# Patient Record
Sex: MALE | Race: WHITE | Employment: OTHER | URBAN - NONMETROPOLITAN AREA
[De-identification: names, ages, dates, MRNs, and addresses within clinical notes are randomized per-mention and may not be internally consistent; named-entity substitution may affect disease eponyms.]

---

## 2016-08-05 LAB
CHOLESTEROL, TOTAL: 138 MG/DL
CHOLESTEROL/HDL RATIO: 2.6
HDLC SERPL-MCNC: 54 MG/DL (ref 35–70)
LDL CHOLESTEROL CALCULATED: 75 MG/DL (ref 0–160)
TRIGL SERPL-MCNC: 45 MG/DL
VLDLC SERPL CALC-MCNC: 9 MG/DL

## 2017-08-07 LAB
AGE FOR GFR: 80
ALT SERPL-CCNC: 29 UNITS/L
ANION GAP SERPL CALCULATED.3IONS-SCNC: 9 MMOL/L
AST SERPL-CCNC: 28 UNITS/L
BUN BLDV-MCNC: 28 MG/DL
CALCIUM SERPL-MCNC: 9.1 MG/DL
CHLORIDE BLD-SCNC: 107 MMOL/L
CHOLESTEROL/HDL RATIO: 2.7 RATIO
CHOLESTEROL: 135 MG/DL
CO2: 30 MMOL/L
CREAT SERPL-MCNC: 0.9 MG/DL
EGFR BF: 73 ML/MIN/1.73 M2
EGFR BM: 98 ML/MIN/1.73 M2
EGFR WF: 60 ML/MIN/1.73 M2
EGFR WM: 81 ML/MIN/1.73 M2
GLUCOSE: 76 MG/DL
HDL, DIRECT: 50 MG/DL
LDL CHOLESTEROL CALCULATED: 77.2 MG/DL
POTASSIUM SERPL-SCNC: 4.6 MMOL/L
SODIUM BLD-SCNC: 141 MMOL/L
TRIGL SERPL-MCNC: 39 MG/DL
VLDLC SERPL CALC-MCNC: 8 MG/DL

## 2017-08-08 VITALS
BODY MASS INDEX: 31.64 KG/M2 | WEIGHT: 226 LBS | SYSTOLIC BLOOD PRESSURE: 122 MMHG | HEART RATE: 60 BPM | DIASTOLIC BLOOD PRESSURE: 68 MMHG | HEIGHT: 71 IN

## 2017-08-08 DIAGNOSIS — D69.6 THROMBOCYTOPENIA (HCC): ICD-10-CM

## 2017-08-08 DIAGNOSIS — I10 UNSPECIFIED ESSENTIAL HYPERTENSION: ICD-10-CM

## 2017-08-08 DIAGNOSIS — R26.9 ABNORMALITY OF GAIT: ICD-10-CM

## 2017-08-08 PROBLEM — E78.5 HYPERLIPEMIA: Status: ACTIVE | Noted: 2017-08-08

## 2017-08-08 PROBLEM — N40.0 BPH (BENIGN PROSTATIC HYPERTROPHY): Status: ACTIVE | Noted: 2017-08-08

## 2017-08-08 PROBLEM — N52.9 ERECTILE DYSFUNCTION: Status: ACTIVE | Noted: 2017-08-08

## 2017-08-08 RX ORDER — FINASTERIDE 5 MG/1
5 TABLET, FILM COATED ORAL DAILY
COMMUNITY
End: 2017-09-19 | Stop reason: SDUPTHER

## 2017-08-08 RX ORDER — ATORVASTATIN CALCIUM 20 MG/1
20 TABLET, FILM COATED ORAL NIGHTLY
COMMUNITY
End: 2018-04-05 | Stop reason: SDUPTHER

## 2017-08-08 RX ORDER — MELOXICAM 7.5 MG/1
7.5 TABLET ORAL DAILY
COMMUNITY
End: 2017-08-10 | Stop reason: SDUPTHER

## 2017-08-08 RX ORDER — FAMOTIDINE 20 MG/1
20 TABLET, FILM COATED ORAL 2 TIMES DAILY
COMMUNITY
End: 2019-05-18 | Stop reason: ALTCHOICE

## 2017-08-08 RX ORDER — DOCUSATE SODIUM 100 MG/1
100 CAPSULE, LIQUID FILLED ORAL DAILY
COMMUNITY
End: 2020-08-20 | Stop reason: SDUPTHER

## 2017-08-08 RX ORDER — CHOLECALCIFEROL (VITAMIN D3) 125 MCG
10 CAPSULE ORAL DAILY
COMMUNITY
End: 2022-11-01

## 2017-08-08 RX ORDER — OXYBUTYNIN CHLORIDE 10 MG/1
10 TABLET, EXTENDED RELEASE ORAL DAILY
COMMUNITY
End: 2018-01-05 | Stop reason: SDUPTHER

## 2017-08-08 RX ORDER — ASPIRIN 325 MG
325 TABLET ORAL DAILY
COMMUNITY
End: 2019-05-18 | Stop reason: ALTCHOICE

## 2017-08-08 RX ORDER — POLYETHYLENE GLYCOL 3350 17 G/17G
17 POWDER, FOR SOLUTION ORAL DAILY
COMMUNITY

## 2017-08-08 RX ORDER — PSEUDOEPHEDRINE HCL 120 MG/1
120 TABLET, FILM COATED, EXTENDED RELEASE ORAL EVERY 12 HOURS
COMMUNITY
End: 2017-08-10 | Stop reason: ALTCHOICE

## 2017-08-08 RX ORDER — SILDENAFIL 50 MG/1
50 TABLET, FILM COATED ORAL PRN
COMMUNITY
End: 2018-02-22

## 2017-08-10 ENCOUNTER — OFFICE VISIT (OUTPATIENT)
Dept: FAMILY MEDICINE CLINIC | Age: 82
End: 2017-08-10
Payer: MEDICARE

## 2017-08-10 VITALS
DIASTOLIC BLOOD PRESSURE: 80 MMHG | WEIGHT: 226 LBS | HEART RATE: 80 BPM | SYSTOLIC BLOOD PRESSURE: 128 MMHG | BODY MASS INDEX: 31.43 KG/M2

## 2017-08-10 DIAGNOSIS — D69.6 THROMBOCYTOPENIA (HCC): ICD-10-CM

## 2017-08-10 DIAGNOSIS — I10 UNSPECIFIED ESSENTIAL HYPERTENSION: Primary | ICD-10-CM

## 2017-08-10 DIAGNOSIS — E78.2 MIXED HYPERLIPIDEMIA: ICD-10-CM

## 2017-08-10 DIAGNOSIS — R26.9 ABNORMALITY OF GAIT: ICD-10-CM

## 2017-08-10 LAB
BASOPHILS # BLD: 0.03 THOU/MM3
DIFFERENTIAL: AUTOMATED DIFF
EOSINOPHIL # BLD: 0.2 THOU/MM3
HCT VFR BLD CALC: 45.5 %
HEMOGLOBIN: 14.7 G/DL
LYMPHOCYTES # BLD: 0.95 THOU/MM3
MCH RBC QN AUTO: 33.3 PG
MCHC RBC AUTO-ENTMCNC: 32.4 G/DL
MCV RBC AUTO: 102.9 FL
MONOCYTES # BLD: 0.5 THOU/MM3
NEUTROPHILS: 1.83 THOU/MM3
PDW BLD-RTO: 12 %
PLATELET # BLD: 109 THOU/MM3
PMV BLD AUTO: 7.1 FL
RBC # BLD: 4.42 M/UL
WBC # BLD: 3.51 THOU/ML3

## 2017-08-10 PROCEDURE — 99214 OFFICE O/P EST MOD 30 MIN: CPT | Performed by: FAMILY MEDICINE

## 2017-08-10 RX ORDER — MELOXICAM 7.5 MG/1
7.5 TABLET ORAL DAILY
Qty: 90 TABLET | Refills: 3 | Status: SHIPPED | OUTPATIENT
Start: 2017-08-10 | End: 2018-07-11 | Stop reason: SDUPTHER

## 2017-08-10 ASSESSMENT — PATIENT HEALTH QUESTIONNAIRE - PHQ9
SUM OF ALL RESPONSES TO PHQ QUESTIONS 1-9: 0
2. FEELING DOWN, DEPRESSED OR HOPELESS: 0
1. LITTLE INTEREST OR PLEASURE IN DOING THINGS: 0
SUM OF ALL RESPONSES TO PHQ9 QUESTIONS 1 & 2: 0

## 2017-08-10 ASSESSMENT — ENCOUNTER SYMPTOMS
ABDOMINAL PAIN: 0
DIARRHEA: 0
NAUSEA: 0
CONSTIPATION: 0

## 2017-08-28 ENCOUNTER — TELEPHONE (OUTPATIENT)
Dept: FAMILY MEDICINE CLINIC | Age: 82
End: 2017-08-28

## 2017-08-28 DIAGNOSIS — M25.511 CHRONIC PAIN OF BOTH SHOULDERS: Primary | ICD-10-CM

## 2017-08-28 DIAGNOSIS — G89.29 CHRONIC PAIN OF BOTH SHOULDERS: Primary | ICD-10-CM

## 2017-08-28 DIAGNOSIS — M25.512 CHRONIC PAIN OF BOTH SHOULDERS: Primary | ICD-10-CM

## 2017-09-20 RX ORDER — FINASTERIDE 5 MG/1
TABLET, FILM COATED ORAL
Qty: 90 TABLET | Refills: 3 | Status: SHIPPED | OUTPATIENT
Start: 2017-09-20 | End: 2018-08-23 | Stop reason: SDUPTHER

## 2017-09-25 ENCOUNTER — NURSE ONLY (OUTPATIENT)
Dept: FAMILY MEDICINE CLINIC | Age: 82
End: 2017-09-25
Payer: MEDICARE

## 2017-09-25 VITALS — TEMPERATURE: 97.7 F

## 2017-09-25 DIAGNOSIS — Z23 NEED FOR PROPHYLACTIC VACCINATION AND INOCULATION AGAINST INFLUENZA: Primary | ICD-10-CM

## 2017-09-25 PROCEDURE — G0008 ADMIN INFLUENZA VIRUS VAC: HCPCS | Performed by: FAMILY MEDICINE

## 2017-09-25 PROCEDURE — 90662 IIV NO PRSV INCREASED AG IM: CPT | Performed by: FAMILY MEDICINE

## 2017-12-13 ENCOUNTER — OFFICE VISIT (OUTPATIENT)
Dept: FAMILY MEDICINE CLINIC | Age: 82
End: 2017-12-13
Payer: MEDICARE

## 2017-12-13 VITALS
WEIGHT: 230 LBS | DIASTOLIC BLOOD PRESSURE: 90 MMHG | SYSTOLIC BLOOD PRESSURE: 160 MMHG | OXYGEN SATURATION: 95 % | TEMPERATURE: 96.5 F | BODY MASS INDEX: 31.99 KG/M2 | HEART RATE: 63 BPM

## 2017-12-13 DIAGNOSIS — M76.891 TENDINITIS INVOLVING RIGHT HIP ABDUCTORS: Primary | ICD-10-CM

## 2017-12-13 PROCEDURE — 99213 OFFICE O/P EST LOW 20 MIN: CPT | Performed by: FAMILY MEDICINE

## 2017-12-13 RX ORDER — PREDNISONE 20 MG/1
20 TABLET ORAL DAILY
Qty: 7 TABLET | Refills: 0 | Status: SHIPPED | OUTPATIENT
Start: 2017-12-13 | End: 2017-12-20

## 2017-12-13 ASSESSMENT — ENCOUNTER SYMPTOMS: CHEST TIGHTNESS: 0

## 2017-12-13 NOTE — PROGRESS NOTES
[address] [address] [address]  Dept: [phone]  Dept Fax: [phone]    June León is a [de-identified] y.o. male who presents today for his medical conditions/complaints as noted below. June León is c/o of Leg Pain (right, upper outer thigh, only when sitting)      HPI:     HPI  Right hip- similar to previous. No recent injury but a few weeks ago fell when sitting in the garage onto the right hip. No pain at the time, this started weeks later. HAs increased in stiiffness. Fine when up and moving, sitiff and aches when sitting or lying. This is the hip he broke and has replaced. BP Readings from Last 3 Encounters:   12/13/17 (!) 160/90   08/10/17 128/80   03/17/17 122/68          (goal 120/80)    Wt Readings from Last 3 Encounters:   12/13/17 230 lb (104.3 kg)   08/10/17 226 lb (102.5 kg)   03/17/17 226 lb (102.5 kg)        Past Medical History:   Diagnosis Date    C2 cervical fracture (HCC)     Cancer ([address]     skin    Enlarged prostate     Hyperlipidemia     Thrombocytopenia (HCC)     borderline episodic      Past Surgical History:   Procedure Laterality Date    CERVICAL SPINE SURGERY  03/29/2016    [facility] C2 Fracture    HIP SURGERY Right     ORIF after fall on the ice    HIP SURGERY Right 01/2015       Family History   Problem Relation Age of Onset    Stroke Father     Coronary Art Dis Father     Stroke Sister        Social History   Substance Use Topics    Smoking status: Never Smoker    Smokeless tobacco: Never Used    Alcohol use No      Current Outpatient Prescriptions   Medication Sig Dispense Refill    predniSONE (DELTASONE) 20 MG tablet Take 1 tablet by mouth daily for 7 days Take with food.  7 tablet 0    finasteride (PROSCAR) 5 MG tablet TAKE 1 TABLET DAILY 90 tablet 3    meloxicam (MOBIC) 7.5 MG tablet Take 1 tablet by mouth daily 90 tablet 3    oxybutynin (DITROPAN-XL) 10 MG extended release tablet Take 10 mg by mouth daily      Handicap Placard MISC by Does not apply route      aspirin 325 MG tablet Take 325 mg by mouth daily      polyethylene glycol (GLYCOLAX) powder Take 17 g by mouth daily      melatonin 5 MG TABS tablet Take 5 mg by mouth daily      docusate sodium (COLACE) 100 MG capsule Take 100 mg by mouth daily      atorvastatin (LIPITOR) 20 MG tablet Take 20 mg by mouth nightly      sildenafil (VIAGRA) 50 MG tablet Take 50 mg by mouth as needed for Erectile Dysfunction      famotidine (PEPCID) 20 MG tablet Take 20 mg by mouth 2 times daily       No current facility-administered medications for this visit. No Known Allergies    Health Maintenance   Topic Date Due    DTaP/Tdap/Td vaccine (2 - Td) 12/02/2019    Zostavax vaccine  Completed    Flu vaccine  Completed    Pneumococcal low/med risk  Completed       Subjective:      Review of Systems   Constitutional: Negative for activity change, appetite change, fatigue, fever and unexpected weight change. Respiratory: Negative for chest tightness. Cardiovascular: Positive for leg swelling. Actually pretty good       Objective:     BP (!) 160/90   Pulse 63   Temp 96.5 °F (35.8 °C) (Tympanic)   Wt 230 lb (104.3 kg)   SpO2 95%   BMI 31.99 kg/m²     Physical Exam   Constitutional: He appears well-developed and well-nourished. Musculoskeletal: He exhibits edema. Legs:  1+ with brawny change bilaterally worse on the right- chronic. No groin tenderness, normal pulses   Psychiatric: He has a normal mood and affect. His behavior is normal. Judgment and thought content normal.   Nursing note and vitals reviewed. Assessment/Plan:     1.  Tendinitis involving right hip abductors  [facility] [place]    predniSONE (DELTASONE) 20 MG tablet         Lab Results   Component Value Date    WBC 3.51 (L) 08/10/2017    HGB 14.7 08/10/2017    HCT 45.5 08/10/2017     (L) 08/10/2017    CHOL 135 08/07/2017    TRIG 39

## 2018-01-05 DIAGNOSIS — N40.0 BENIGN PROSTATIC HYPERPLASIA, UNSPECIFIED WHETHER LOWER URINARY TRACT SYMPTOMS PRESENT: Primary | ICD-10-CM

## 2018-01-05 RX ORDER — OXYBUTYNIN CHLORIDE 10 MG/1
TABLET, EXTENDED RELEASE ORAL
Qty: 90 TABLET | Refills: 0 | Status: SHIPPED | OUTPATIENT
Start: 2018-01-05 | End: 2018-03-23 | Stop reason: SDUPTHER

## 2018-01-05 NOTE — TELEPHONE ENCOUNTER
Artemio Figueroa is calling to request a refill on the following medication(s):  Requested Prescriptions     Pending Prescriptions Disp Refills    oxybutynin (DITROPAN-XL) 10 MG extended release tablet [Pharmacy Med Name: OXYBUTYNIN CL ER TABS 10MG] 90 tablet 0     Sig: TAKE 1 TABLET DAILY       Last Visit Date (If Applicable):  54/14/8870    Next Visit Date:    2/22/2018

## 2018-02-22 ENCOUNTER — OFFICE VISIT (OUTPATIENT)
Dept: FAMILY MEDICINE CLINIC | Age: 83
End: 2018-02-22
Payer: MEDICARE

## 2018-02-22 VITALS
SYSTOLIC BLOOD PRESSURE: 138 MMHG | HEART RATE: 78 BPM | DIASTOLIC BLOOD PRESSURE: 86 MMHG | WEIGHT: 238 LBS | BODY MASS INDEX: 31.54 KG/M2 | HEIGHT: 73 IN

## 2018-02-22 DIAGNOSIS — Z87.81 HISTORY OF HIP FRACTURE: ICD-10-CM

## 2018-02-22 DIAGNOSIS — I10 ESSENTIAL HYPERTENSION: ICD-10-CM

## 2018-02-22 DIAGNOSIS — E78.2 MIXED HYPERLIPIDEMIA: ICD-10-CM

## 2018-02-22 DIAGNOSIS — D69.6 THROMBOCYTOPENIA (HCC): ICD-10-CM

## 2018-02-22 DIAGNOSIS — M19.049 HAND ARTHRITIS: ICD-10-CM

## 2018-02-22 DIAGNOSIS — Z00.00 ROUTINE GENERAL MEDICAL EXAMINATION AT A HEALTH CARE FACILITY: Primary | ICD-10-CM

## 2018-02-22 DIAGNOSIS — Z23 NEED FOR PROPHYLACTIC VACCINATION AND INOCULATION AGAINST VARICELLA: ICD-10-CM

## 2018-02-22 DIAGNOSIS — Z87.81 HISTORY OF CERVICAL FRACTURE: ICD-10-CM

## 2018-02-22 DIAGNOSIS — M85.9 DISORDER OF BONE DENSITY AND STRUCTURE, UNSPECIFIED: ICD-10-CM

## 2018-02-22 PROCEDURE — G0439 PPPS, SUBSEQ VISIT: HCPCS | Performed by: FAMILY MEDICINE

## 2018-02-22 ASSESSMENT — LIFESTYLE VARIABLES
AUDIT-C TOTAL SCORE: 1
HOW OFTEN DO YOU HAVE A DRINK CONTAINING ALCOHOL: 1
HOW MANY STANDARD DRINKS CONTAINING ALCOHOL DO YOU HAVE ON A TYPICAL DAY: 0
HOW OFTEN DO YOU HAVE SIX OR MORE DRINKS ON ONE OCCASION: 0

## 2018-02-22 ASSESSMENT — PATIENT HEALTH QUESTIONNAIRE - PHQ9: SUM OF ALL RESPONSES TO PHQ QUESTIONS 1-9: 0

## 2018-02-22 ASSESSMENT — ANXIETY QUESTIONNAIRES: GAD7 TOTAL SCORE: 0

## 2018-02-22 NOTE — LETTER
February 22, 2018     Patient: Kwame Ibarra   YOB: 1935   Date of Visit: 2/22/2018       To Whom it May Concern:    Kwame Ibarra was seen in my clinic on 2/22/2018. He has a jhonathan in his right hip secondary to previous hip trauma. If you have any questions or concerns, please don't hesitate to call.     Sincerely,         María Pack MD

## 2018-02-22 NOTE — PROGRESS NOTES
inoculation against varicella  ordered  - zoster recombinant adjuvanted vaccine (SHINGRIX) 50 MCG SUSR injection; Inject 0.5 mLs into the muscle once for 1 dose  Dispense: 0.5 mL; Refill: 0    4. History of hip fracture    - DEXA Bone Density 2 Sites; Future    5. History of cervical fracture    - DEXA Bone Density 2 Sites; Future    6. Hand arthritis  Can use OTC topical rubs and would consider and injection if needed    7. Essential hypertension  Well controlled at this time  - Basic Metabolic Panel, Fasting; Future    8. Thrombocytopenia ([address]  History of- last few labs were improved but should have follow up   - CBC Auto Differential; Future    9. Mixed hyperlipidemia    - ALT; Future  - Lipid Panel; Future  - AST; Future      Positive Risk Factor Screenings with Interventions:               Health Habits/Nutrition:  Health Habits/Nutrition  Do you exercise for at least 20 minutes 2-3 times per week?: Yes  Have you lost any weight without trying in the past 3 months?: No  Do you eat fewer than 2 meals per day?: No  Have you seen a dentist within the past year?: Yes  Body mass index is 31.4 kg/m².   Health Habits/Nutrition Interventions:  · using cardiac rehab facilities            Personalized Preventive Plan   Current Health Maintenance Status  Immunization History   Administered Date(s) Administered    DTaP (Infanrix) 12/02/2009    Hepatitis A Adult (Havarix) 05/01/2001, 10/20/2001, 11/20/2001    Hepatitis B Adult (Engerix-B) 04/30/1997, 05/30/1997, 09/24/1997    Influenza Virus Vaccine 11/09/2016    Influenza, High Dose 09/25/2017    Meningococcal B, OMV (Bexsero) 11/16/2011    Pneumococcal 13-valent Conjugate ([id number]) 08/17/2015    Pneumococcal Polysaccharide ([id number]) 02/25/2010    Yellow Fever 11/01/2011    Zoster Live (Zostavax) 04/18/2012        Health Maintenance   Topic Date Due    Shingles Vaccine (1 of 2 - 2 Dose Series) 12/24/1986    Potassium monitoring  08/07/2018    Creatinine

## 2018-02-22 NOTE — PATIENT INSTRUCTIONS
Personalized Preventive Plan for Jozef Corral - 2/22/2018  Medicare offers a range of preventive health benefits. Some of the tests and screenings are paid in full while other may be subject to a deductible, co-insurance, and/or copay. Some of these benefits include a comprehensive review of your medical history including lifestyle, illnesses that may run in your family, and various assessments and screenings as appropriate. After reviewing your medical record and screening and assessments performed today your provider may have ordered immunizations, labs, imaging, and/or referrals for you. A list of these orders (if applicable) as well as your Preventive Care list are included within your After Visit Summary for your review. Other Preventive Recommendations:    · A preventive eye exam performed by an eye specialist is recommended every 1-2 years to screen for glaucoma; cataracts, macular degeneration, and other eye disorders. · A preventive dental visit is recommended every 6 months. · Try to get at least 150 minutes of exercise per week or 10,000 steps per day on a pedometer . · Order or download the FREE \"Exercise & Physical Activity: Your Everyday Guide\" from [employer]. Call 4-696.338.9188 or search [employer] online. · You need 1297-7734 mg of calcium and 0551-5140 IU of vitamin D per day. It is possible to meet your calcium requirement with diet alone, but a vitamin D supplement is usually necessary to meet this goal.  · When exposed to the sun, use a sunscreen that protects against both UVA and UVB radiation with an SPF of 30 or greater. Reapply every 2 to 3 hours or after sweating, drying off with a towel, or swimming.   · Always wear a seat belt when traveling in a car

## 2018-03-23 DIAGNOSIS — N40.0 BENIGN PROSTATIC HYPERPLASIA, UNSPECIFIED WHETHER LOWER URINARY TRACT SYMPTOMS PRESENT: ICD-10-CM

## 2018-03-23 RX ORDER — OXYBUTYNIN CHLORIDE 10 MG/1
10 TABLET, EXTENDED RELEASE ORAL DAILY
Qty: 90 TABLET | Refills: 3 | Status: SHIPPED | OUTPATIENT
Start: 2018-03-23 | End: 2019-04-04 | Stop reason: SDUPTHER

## 2018-04-08 RX ORDER — ATORVASTATIN CALCIUM 20 MG/1
TABLET, FILM COATED ORAL
Qty: 90 TABLET | Refills: 3 | Status: SHIPPED | OUTPATIENT
Start: 2018-04-08 | End: 2019-05-20 | Stop reason: SDUPTHER

## 2018-04-09 ENCOUNTER — TELEPHONE (OUTPATIENT)
Dept: FAMILY MEDICINE CLINIC | Age: 83
End: 2018-04-09

## 2018-07-12 RX ORDER — MELOXICAM 7.5 MG/1
TABLET ORAL
Qty: 90 TABLET | Refills: 3 | Status: SHIPPED | OUTPATIENT
Start: 2018-07-12 | End: 2019-05-18 | Stop reason: ALTCHOICE

## 2018-08-09 LAB
AGE FOR GFR: 81
ALT SERPL-CCNC: 29 UNITS/L
ANION GAP SERPL CALCULATED.3IONS-SCNC: 13 MMOL/L
AST SERPL-CCNC: 26 UNITS/L
BASOPHILS # BLD: 0.04 THOU/MM3
BUN BLDV-MCNC: 29 MG/DL
CALCIUM SERPL-MCNC: 9.3 MG/DL
CHLORIDE BLD-SCNC: 106 MMOL/L
CHOLESTEROL/HDL RATIO: 2.5 RATIO
CHOLESTEROL: 146 MG/DL
CO2: 27 MMOL/L
CREAT SERPL-MCNC: 0.9 MG/DL
DIFFERENTIAL: AUTOMATED DIFF
EGFR BF: 73 ML/MIN/1.73 M2
EGFR BM: 98 ML/MIN/1.73 M2
EGFR WF: 60 ML/MIN/1.73 M2
EGFR WM: 81 ML/MIN/1.73 M2
EOSINOPHIL # BLD: 0.16 THOU/MM3
GLUCOSE: 83 MG/DL
HCT VFR BLD CALC: 41.6 %
HDL, DIRECT: 59 MG/DL
HEMOGLOBIN: 14 G/DL
LDL CHOLESTEROL CALCULATED: 78.6 MG/DL
LYMPHOCYTES # BLD: 0.83 THOU/MM3
MCH RBC QN AUTO: 33.4 PG
MCHC RBC AUTO-ENTMCNC: 33.7 G/DL
MCV RBC AUTO: 99.3 FL
MONOCYTES # BLD: 0.55 THOU/MM3
NEUTROPHILS: 2.71 THOU/MM3
PDW BLD-RTO: 11.6 %
PLATELET # BLD: 138 THOU/MM3
PMV BLD AUTO: 6.7 FL
POTASSIUM SERPL-SCNC: 4.7 MMOL/L
RBC # BLD: 4.19 M/UL
SODIUM BLD-SCNC: 141 MMOL/L
TRIGL SERPL-MCNC: 42 MG/DL
VLDLC SERPL CALC-MCNC: 8 MG/DL
WBC # BLD: 4.29 THOU/ML3

## 2018-08-23 ENCOUNTER — OFFICE VISIT (OUTPATIENT)
Dept: FAMILY MEDICINE CLINIC | Age: 83
End: 2018-08-23
Payer: MEDICARE

## 2018-08-23 VITALS
SYSTOLIC BLOOD PRESSURE: 122 MMHG | HEART RATE: 66 BPM | WEIGHT: 239 LBS | DIASTOLIC BLOOD PRESSURE: 74 MMHG | BODY MASS INDEX: 31.53 KG/M2

## 2018-08-23 DIAGNOSIS — I10 ESSENTIAL HYPERTENSION: Primary | ICD-10-CM

## 2018-08-23 DIAGNOSIS — N40.0 BENIGN PROSTATIC HYPERPLASIA, UNSPECIFIED WHETHER LOWER URINARY TRACT SYMPTOMS PRESENT: ICD-10-CM

## 2018-08-23 DIAGNOSIS — E78.2 MIXED HYPERLIPIDEMIA: ICD-10-CM

## 2018-08-23 DIAGNOSIS — R26.9 ABNORMALITY OF GAIT: ICD-10-CM

## 2018-08-23 PROCEDURE — 99214 OFFICE O/P EST MOD 30 MIN: CPT | Performed by: FAMILY MEDICINE

## 2018-08-23 RX ORDER — FINASTERIDE 5 MG/1
TABLET, FILM COATED ORAL
Qty: 90 TABLET | Refills: 3 | Status: SHIPPED | OUTPATIENT
Start: 2018-08-23 | End: 2019-09-03 | Stop reason: SDUPTHER

## 2018-08-23 ASSESSMENT — ENCOUNTER SYMPTOMS
BLURRED VISION: 0
DIARRHEA: 0
ABDOMINAL PAIN: 0
CONSTIPATION: 0
SHORTNESS OF BREATH: 0
ORTHOPNEA: 0

## 2018-08-23 NOTE — PROGRESS NOTES
Review of Systems   Constitutional: Negative for activity change, appetite change and fatigue. Respiratory: Negative for shortness of breath. Cardiovascular: Negative for chest pain and palpitations. Gastrointestinal: Negative for abdominal pain, constipation and diarrhea. Genitourinary: Negative for difficulty urinating, frequency and urgency. Neurological: Negative for dizziness and light-headedness. Psychiatric/Behavioral: Negative for sleep disturbance.      No verbal complaints, doing well

## 2018-08-23 NOTE — PROGRESS NOTES
[address] [address] [address]  Dept: [phone]  Dept Fax: [phone]    Elijah Mac is a 81 y.o. male who presents today for his medical conditions/complaints as noted below. Elijah Mac is c/o of Hypertension      HPI:     Hypertension   This is a chronic problem. The current episode started today. The problem is unchanged. The problem is controlled. Pertinent negatives include no anxiety, blurred vision, chest pain, headaches, malaise/fatigue, neck pain, orthopnea, palpitations, peripheral edema, PND, shortness of breath or sweats. There are no associated agents to hypertension. Risk factors for coronary artery disease include dyslipidemia. Past treatments include lifestyle changes. 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 Occasional aches more in the hands from using the cane.   Does take the meloxicam yet but thinks he can do without regularly    Has episodic times when he has to really rush to get to the bathroom - no waiting, no incontinence.,    BP Readings from Last 3 Encounters:   08/23/18 122/74   02/22/18 138/86   12/13/17 (!) 160/90          (goal 120/80)    Wt Readings from Last 3 Encounters:   08/23/18 239 lb (108.4 kg)   02/22/18 238 lb (108 kg)   12/13/17 230 lb (104.3 kg)        Past Medical History:   Diagnosis Date    C2 cervical fracture (HCC)     Cancer (HCC)     skin    Enlarged prostate     Hyperlipidemia     Thrombocytopenia (HCC)     borderline episodic      Past Surgical History:   Procedure Laterality Date    CERVICAL SPINE SURGERY  [facility]/29/2016    [facility] C2 Fracture    HIP SURGERY Right     ORIF after fall on the ice    HIP SURGERY Right 01/2015       Family History   Problem Relation Age of Onset    Stroke Father     Coronary Art Dis Father     Stroke Sister        Social History atraumatic. Eyes: Conjunctivae and EOM are normal.   Neck: Normal range of motion. Neck supple. No JVD present. No thyromegaly present. Cardiovascular: Normal rate, regular rhythm and intact distal pulses. Exam reveals no gallop and no friction rub. Murmur (R>L 3/6 RAYNA) heard. Pulmonary/Chest: Effort normal and breath sounds normal. No respiratory distress. Abdominal: Soft. Musculoskeletal: He exhibits edema (1-2+ brawny). Lymphadenopathy:     He has no cervical adenopathy. Neurological: He is alert and oriented to person, place, and time. Skin: Skin is warm. Psychiatric: He has a normal mood and affect. His behavior is normal. Judgment and thought content normal.   Nursing note and vitals reviewed. Assessment/Plan:      Diagnosis Orders   1. Essential hypertension  Well controlled off of the medications. Stay off at this time   2. Mixed hyperlipidemia  Reviewed labs- continue on the statin at this time. Can stop the atorvastatin for a few weeks and see if he notices a difference and if not then resume. 3. Abnormality of gait  Doing well- continue with the cane at this time     As the prostate issue and the bladder urgency is not much of an issue would not increase the meds at this time. Due to potential for increased side effects. Lab Results   Component Value Date    WBC 4.29 (L) 08/09/2018    HGB 14.0 08/09/2018    HCT 41.6 (L) 08/09/2018     08/09/2018    CHOL 146 08/09/2018    TRIG 42 08/09/2018    HDL 54 08/05/2016    ALT 29 08/09/2018    AST 26 08/09/2018     08/09/2018    K 4.7 08/09/2018     08/09/2018    CREATININE 0.9 08/09/2018    BUN 29 (H) 08/09/2018    CO2 27 08/09/2018       Return in about 6 months (around 2/23/2019) for AWV. Patient given educational materials - see patient instructions. Discussed use, benefit, and side effects of prescribed medications. All patient questions answered. Pt voiced understanding.  Reviewed health

## 2018-09-06 ENCOUNTER — OFFICE VISIT (OUTPATIENT)
Dept: FAMILY MEDICINE CLINIC | Age: 83
End: 2018-09-06
Payer: MEDICARE

## 2018-09-06 VITALS
DIASTOLIC BLOOD PRESSURE: 88 MMHG | WEIGHT: 234 LBS | HEART RATE: 72 BPM | SYSTOLIC BLOOD PRESSURE: 136 MMHG | BODY MASS INDEX: 30.87 KG/M2

## 2018-09-06 DIAGNOSIS — M25.551 RIGHT HIP PAIN: Primary | ICD-10-CM

## 2018-09-06 DIAGNOSIS — Z23 NEED FOR PROPHYLACTIC VACCINATION AND INOCULATION AGAINST INFLUENZA: ICD-10-CM

## 2018-09-06 PROCEDURE — 99213 OFFICE O/P EST LOW 20 MIN: CPT | Performed by: FAMILY MEDICINE

## 2018-09-06 PROCEDURE — G0008 ADMIN INFLUENZA VIRUS VAC: HCPCS | Performed by: FAMILY MEDICINE

## 2018-09-06 PROCEDURE — 90662 IIV NO PRSV INCREASED AG IM: CPT | Performed by: FAMILY MEDICINE

## 2018-09-06 NOTE — PROGRESS NOTES
oxybutynin (DITROPAN-XL) 10 MG extended release tablet Take 1 tablet by mouth daily 90 tablet 3    Handicap Placard MISC by Does not apply route      aspirin 325 MG tablet Take 325 mg by mouth daily      polyethylene glycol (GLYCOLAX) powder Take 17 g by mouth daily      melatonin 5 MG TABS tablet Take 10 mg by mouth daily       docusate sodium (COLACE) 100 MG capsule Take 100 mg by mouth daily      famotidine (PEPCID) 20 MG tablet Take 20 mg by mouth 2 times daily      diclofenac sodium (VOLTAREN) 1 % GEL Apply 2 g topically 4 times daily 2 g for small joints, 4 g for large joints 2 Tube 5     No current facility-administered medications for this visit. No Known Allergies    Health Maintenance   Topic Date Due    Potassium monitoring  08/09/2019    Creatinine monitoring  08/09/2019    DTaP/Tdap/Td vaccine (2 - Tdap) 12/02/2019    Flu vaccine  Completed    Pneumococcal low/med risk  Completed    Shingles Vaccine  Completed       Subjective:      Review of Systems    Objective:     /88   Pulse 72   Wt 234 lb (106.1 kg)   BMI 30.87 kg/m²     Physical Exam   Constitutional: He is oriented to person, place, and time. He appears well-developed and well-nourished. Cardiovascular: Normal rate. Musculoskeletal: He exhibits tenderness. He exhibits no edema. Legs:  Neurological: He is alert and oriented to person, place, and time. Nursing note and vitals reviewed. Assessment/Plan:      Diagnosis Orders   1. Right hip pain  XR HIP RIGHT (2-3 VIEWS)   2. Need for prophylactic vaccination and inoculation against influenza  INFLUENZA, HIGH DOSE, 65 YRS +, IM, PF, PREFILL SYR, 0.5ML (FLUZONE HD)       Suspect this is hip tendon tightness. Recommend getting back into his regular exercise program with the [facility] for the hip stretches.   If not improving then would consider referral to ortho or PT  Lab Results   Component Value Date    WBC 4.29 (L) 08/09/2018    HGB 14.0 08/09/2018    HCT

## 2019-02-28 ENCOUNTER — OFFICE VISIT (OUTPATIENT)
Dept: FAMILY MEDICINE CLINIC | Age: 84
End: 2019-02-28
Payer: MEDICARE

## 2019-02-28 VITALS
DIASTOLIC BLOOD PRESSURE: 80 MMHG | SYSTOLIC BLOOD PRESSURE: 118 MMHG | HEART RATE: 80 BPM | HEIGHT: 69 IN | BODY MASS INDEX: 33.92 KG/M2 | WEIGHT: 229 LBS

## 2019-02-28 DIAGNOSIS — Z00.00 ROUTINE GENERAL MEDICAL EXAMINATION AT A HEALTH CARE FACILITY: ICD-10-CM

## 2019-02-28 DIAGNOSIS — D69.6 THROMBOCYTOPENIA (HCC): ICD-10-CM

## 2019-02-28 DIAGNOSIS — E78.2 MIXED HYPERLIPIDEMIA: ICD-10-CM

## 2019-02-28 DIAGNOSIS — E01.0 THYROMEGALY: ICD-10-CM

## 2019-02-28 DIAGNOSIS — Z13.1 SCREENING FOR DIABETES MELLITUS: ICD-10-CM

## 2019-02-28 DIAGNOSIS — I10 ESSENTIAL HYPERTENSION: ICD-10-CM

## 2019-02-28 LAB — TSH REFLEX FT4: 2.21 MIU/ML (ref 0.49–4.6)

## 2019-02-28 PROCEDURE — G0438 PPPS, INITIAL VISIT: HCPCS | Performed by: FAMILY MEDICINE

## 2019-02-28 RX ORDER — KETOCONAZOLE 20 MG/ML
SHAMPOO TOPICAL
Qty: 120 ML | Refills: 5 | Status: SHIPPED | OUTPATIENT
Start: 2019-02-28 | End: 2021-03-04 | Stop reason: SDUPTHER

## 2019-02-28 ASSESSMENT — LIFESTYLE VARIABLES
AUDIT-C TOTAL SCORE: 1
HOW MANY STANDARD DRINKS CONTAINING ALCOHOL DO YOU HAVE ON A TYPICAL DAY: 0
HOW OFTEN DO YOU HAVE SIX OR MORE DRINKS ON ONE OCCASION: 0
HOW OFTEN DO YOU HAVE A DRINK CONTAINING ALCOHOL: 1

## 2019-02-28 ASSESSMENT — ANXIETY QUESTIONNAIRES: GAD7 TOTAL SCORE: 0

## 2019-02-28 ASSESSMENT — PATIENT HEALTH QUESTIONNAIRE - PHQ9
SUM OF ALL RESPONSES TO PHQ QUESTIONS 1-9: 0
SUM OF ALL RESPONSES TO PHQ QUESTIONS 1-9: 0

## 2019-04-04 DIAGNOSIS — N40.0 BENIGN PROSTATIC HYPERPLASIA, UNSPECIFIED WHETHER LOWER URINARY TRACT SYMPTOMS PRESENT: ICD-10-CM

## 2019-04-05 RX ORDER — OXYBUTYNIN CHLORIDE 10 MG/1
TABLET, EXTENDED RELEASE ORAL
Qty: 90 TABLET | Refills: 3 | Status: SHIPPED | OUTPATIENT
Start: 2019-04-05 | End: 2020-03-05 | Stop reason: SDUPTHER

## 2019-04-05 NOTE — TELEPHONE ENCOUNTER
Kiah Nava is calling to request a refill on the following medication(s):  Requested Prescriptions     Pending Prescriptions Disp Refills    oxybutynin (DITROPAN-XL) 10 MG extended release tablet [Pharmacy Med Name: OXYBUTYNIN CL ER TABS 10MG] 90 tablet 3     Sig: TAKE 1 TABLET DAILY       Last Visit Date (If Applicable):  0/47/7125    Next Visit Date:    8/29/2019

## 2019-05-18 ENCOUNTER — TELEPHONE (OUTPATIENT)
Dept: FAMILY MEDICINE CLINIC | Age: 84
End: 2019-05-18

## 2019-05-18 DIAGNOSIS — K29.01 GASTROINTESTINAL HEMORRHAGE ASSOCIATED WITH ACUTE GASTRITIS: Primary | ICD-10-CM

## 2019-05-18 DIAGNOSIS — R10.13 EPIGASTRIC PAIN: ICD-10-CM

## 2019-05-18 LAB
AGE FOR GFR: 82
ANION GAP SERPL CALCULATED.3IONS-SCNC: 8 MMOL/L
BASOPHILS # BLD: 0.04 THOU/MM3 (ref 0–0.3)
BUN BLDV-MCNC: 34 MG/DL (ref 9–20)
CALCIUM SERPL-MCNC: 9 MG/DL (ref 8.4–10.2)
CHLORIDE BLD-SCNC: 106 MMOL/L (ref 98–120)
CO2: 30 MMOL/L (ref 22–31)
CREAT SERPL-MCNC: 0.7 MG/DL (ref 0.7–1.3)
DIFFERENTIAL: AUTOMATED DIFF
EGFR BF: 97 ML/MIN/1.73 M2
EGFR BM: 131 ML/MIN/1.73 M2
EGFR WF: 80 ML/MIN/1.73 M2
EGFR WM: 108 ML/MIN/1.73 M2
EOSINOPHIL # BLD: 0.2 THOU/MM3 (ref 0–1.1)
GLUCOSE: 113 MG/DL (ref 75–110)
HCT VFR BLD CALC: 39.1 % (ref 42–52)
HEMOGLOBIN: 12.8 G/DL (ref 13.8–17)
LYMPHOCYTES # BLD: 1.1 THOU/MM3 (ref 1–5.5)
MCH RBC QN AUTO: 32.3 PG (ref 28.5–32)
MCHC RBC AUTO-ENTMCNC: 32.8 G/DL (ref 32–37)
MCV RBC AUTO: 98.5 FL (ref 80–94)
MONOCYTES # BLD: 0.64 THOU/MM3 (ref 0.1–1)
NEUTROPHILS: 3.85 THOU/MM3 (ref 2–8.1)
PDW BLD-RTO: 11.3 % (ref 10–15)
PLATELET # BLD: 136 THOU/MM3 (ref 130–400)
PMV BLD AUTO: 5.7 FL (ref 7.4–11)
POTASSIUM SERPL-SCNC: 4.6 MMOL/L (ref 3.6–5)
RBC # BLD: 3.97 M/UL (ref 4.7–6.1)
SODIUM BLD-SCNC: 139 MMOL/L (ref 135–145)
WBC # BLD: 5.83 THOU/ML3 (ref 4.8–10)

## 2019-05-18 RX ORDER — PANTOPRAZOLE SODIUM 20 MG/1
20 TABLET, DELAYED RELEASE ORAL
Qty: 30 TABLET | Refills: 5 | Status: SHIPPED | OUTPATIENT
Start: 2019-05-18 | End: 2020-01-20 | Stop reason: ALTCHOICE

## 2019-05-18 RX ORDER — RANITIDINE 150 MG/1
150 TABLET ORAL 2 TIMES DAILY
Qty: 60 TABLET | Refills: 3 | Status: SHIPPED | OUTPATIENT
Start: 2019-05-18 | End: 2020-04-10 | Stop reason: SINTOL

## 2019-05-19 ENCOUNTER — TELEPHONE (OUTPATIENT)
Dept: FAMILY MEDICINE CLINIC | Age: 84
End: 2019-05-19

## 2019-05-19 DIAGNOSIS — K29.01 GASTROINTESTINAL HEMORRHAGE ASSOCIATED WITH ACUTE GASTRITIS: Primary | ICD-10-CM

## 2019-05-19 NOTE — TELEPHONE ENCOUNTER
[address] [address] [address]  Dept: [phone]  Dept TKX:[phone]    Danita Sesay is a 81 y.o. male who presents today for his medical conditions/complaints as notedbelow. Danita Sesay is c/o of abdominal pain    HPI:     Param Salcedo complains of abdominal pain-- seen in the office outside of regular office hours, unscheduled visit. Had had some mild upper abd pain for the last week intermittent, more pressure than true pain. Now has been more constant in the last day or so. No N/V and appetite is good but feels like if he eats less maybe the pressure owuld be better. No change in his weight that he is aware of. Then last night had a lot of black tarry appearing stool. No blood in stool. Started as hard but then was soft. Multiple episodes. Then had another black stool that was loose this morning. Has not had SOB, palpitations, no dizziness or weakness noted. No fevers of chills noted. No other ill contacts. Has been taking the meloxicam he thinks on a regular basis, maybe not every day. No other NSAIDS>  Does take an aspirin daily. No new meds. Minimal caffeine and rare alcohol.     BP Readings from Last 3 Encounters:   02/28/19 118/80   09/06/18 136/88   08/23/18 122/74          (goal 120/80)    Wt Readings from Last 3 Encounters:   02/28/19 229 lb (103.9 kg)   09/06/18 234 lb (106.1 kg)   08/23/18 239 lb (108.4 kg)        Past Medical History:   Diagnosis Date    C2 cervical fracture (HCC)     Cancer (HCC)     skin    Enlarged prostate     Hyperlipidemia     Thrombocytopenia (HCC)     borderline episodic      Past Surgical History:   Procedure Laterality Date    CERVICAL SPINE SURG[facility]  [facility]/29/2016    [facility] C2 Fracture    HIP SURGERY Right     ORIF after fall on the ice    HIP SURGERY Right 01/2015       Family History   Problem Relation Age of Onset    Stroke Father     Coronary Art Dis Father     Stroke Sister Basic Metabolic Panel    ranitidine (ZANTAC) 150 MG tablet   2. Epigastric pain  pantoprazole (PROTONIX) 20 MG tablet    CBC Auto Differential    Basic Metabolic Panel       I am concerned that Sanjay Cruz may have chato eupper GI bleeding. He is stable and asx at this time so will treat this and stop any contributing meds. Will check the labs and recheck clinically in a few days. Will need an EGD set up at that time as an outpatient,. Reviewed s/sx to return or go to the ER chuckie if there is dizziness, SOB, black stools etc.    Lab Results   Component Value Date    WBC 5.83 05/18/2019    HGB 12.8 (L) 05/18/2019    HCT 39.1 (L) 05/18/2019     05/18/2019    CHOL 146 08/09/2018    TRIG 42 08/09/2018    HDL 54 08/05/2016    ALT 29 08/09/2018    AST 26 08/09/2018     05/18/2019    K 4.6 05/18/2019     05/18/2019    CREATININE 0.7 05/18/2019    BUN 34 (H) 05/18/2019    CO2 30 05/18/2019       May 22 at 1 pm      Patient given educational materials - see patientinstructions. Discussed use, benefit, and side effects of prescribed medications. All patient questions answered. Pt voiced understanding. Reviewed health maintenance. Instructed to continue current medications, diet andexercise. Patient agreed with treatment plan. Follow up as directed.      Electronically signed by Saurabh Sarmiento MD on 5/18/2019

## 2019-05-20 LAB
BASOPHILS # BLD: 0.04 THOU/MM3 (ref 0–0.3)
DIFFERENTIAL: AUTOMATED DIFF
EOSINOPHIL # BLD: 0.18 THOU/MM3 (ref 0–1.1)
HCT VFR BLD CALC: 36.3 % (ref 42–52)
HEMOGLOBIN: 11.9 G/DL (ref 13.8–17)
LYMPHOCYTES # BLD: 1.02 THOU/MM3 (ref 1–5.5)
MCH RBC QN AUTO: 33 PG (ref 28.5–32)
MCHC RBC AUTO-ENTMCNC: 32.6 G/DL (ref 32–37)
MCV RBC AUTO: 101.1 FL (ref 80–94)
MONOCYTES # BLD: 0.59 THOU/MM3 (ref 0.1–1)
NEUTROPHILS: 3.21 THOU/MM3 (ref 2–8.1)
PDW BLD-RTO: 11.4 % (ref 10–15)
PLATELET # BLD: 144 THOU/MM3 (ref 130–400)
PMV BLD AUTO: 6 FL (ref 7.4–11)
RBC # BLD: 3.59 M/UL (ref 4.7–6.1)
WBC # BLD: 5.03 THOU/ML3 (ref 4.8–10)

## 2019-05-20 RX ORDER — ATORVASTATIN CALCIUM 20 MG/1
TABLET, FILM COATED ORAL
Qty: 90 TABLET | Refills: 3 | Status: SHIPPED | OUTPATIENT
Start: 2019-05-20 | End: 2019-08-29 | Stop reason: SDUPTHER

## 2019-05-20 NOTE — TELEPHONE ENCOUNTER
See other the note. Will be in on Monday to  a CBC to have this rechecked. Spoke with [employer]. He is feeling good. No Abd pain. No Nausea or vomiting. Did have a few more black stools this morning but none since then. Order put in and printed.

## 2019-05-22 ENCOUNTER — OFFICE VISIT (OUTPATIENT)
Dept: FAMILY MEDICINE CLINIC | Age: 84
End: 2019-05-22
Payer: MEDICARE

## 2019-05-22 VITALS
OXYGEN SATURATION: 93 % | DIASTOLIC BLOOD PRESSURE: 82 MMHG | BODY MASS INDEX: 33.46 KG/M2 | WEIGHT: 229.2 LBS | SYSTOLIC BLOOD PRESSURE: 128 MMHG | HEART RATE: 107 BPM

## 2019-05-22 DIAGNOSIS — R10.13 EPIGASTRIC PAIN: ICD-10-CM

## 2019-05-22 DIAGNOSIS — K29.01 GASTROINTESTINAL HEMORRHAGE ASSOCIATED WITH ACUTE GASTRITIS: Primary | ICD-10-CM

## 2019-05-22 DIAGNOSIS — I10 ESSENTIAL HYPERTENSION: ICD-10-CM

## 2019-05-22 PROCEDURE — 99214 OFFICE O/P EST MOD 30 MIN: CPT | Performed by: FAMILY MEDICINE

## 2019-05-22 NOTE — PROGRESS NOTES
[address] [facility]  [address]  Dept: [phone]  Dept [facility]:[phone]    Roxann Palomo is a 81 y.o. male who presents today for his medical conditions/complaints as notedbelow. Roxann Palomo is c/o of Abdominal Pain (states I am doing very good. dark stools seem to be all gone, one day i had about 4 big BM's and that was it. had labs done. is no longer taking aspirin)      HPI:     Abdominal Pain   This is a new problem. The current episode started 1 to 4 weeks ago (About 10 days ago started having abd pain (see phone note/ visit from 5/17)). The problem occurs constantly. The problem has been rapidly improving. The pain is located in the epigastric region and LUQ. The pain is at a severity of 2/10. The pain is mild. The quality of the pain is aching and burning. The abdominal pain does not radiate. Associated symptoms include diarrhea (the pain started first and then started having the black stools over the weekend. The stools ahve subsided and now has had normal BM this morning) and melena. Pertinent negatives include no anorexia, arthralgias, belching, constipation, fever, flatus, frequency, headaches, hematochezia, hematuria, myalgias, nausea, vomiting or weight loss. Nothing aggravates the pain. The pain is relieved by nothing. He has tried H2 blockers and proton pump inhibitors (Was started on pantoprazole and ranitidine) for the symptoms. The treatment provided significant relief. Prior workup: CBC showed a drop from historic 14 to 12 and now in the 11 range- colonoscopy in 2016 was normal.       Has been taking the meloxicam and baby aspirin but has stopped these when told to at the initial visit. Denies any fatigue, no dizziness, no weakness. No CP, palp or SOB. Had 4 large black bowel movements.     BP Readings from Last 3 Encounters:   05/22/19 128/82   02/28/19 118/80   09/06/18 136/88          (goal 120/80)    Wt Readings from Last 3 Encounters:   05/22/19 229 lb 3.2 oz (104 kg)   02/28/19 229 lb (103.9 kg)   09/06/18 234 lb (106.1 kg)        Past Medical History:   Diagnosis Date    C2 cervical fracture (HCC)     Cancer (HCC)     skin    Enlarged prostate     Hyperlipidemia     Thrombocytopenia (HCC)     borderline episodic      Past Surgical History:   Procedure Laterality Date    CERV[facility] SP[facility]  [facility]/29/2016    [facility] C2 Fracture    HIP SURGERY Right     ORIF after fall on the ice    HIP SURGERY Right 01/2015       Family History   Problem Relation Age of Onset    Stroke Father     Coronary Art Dis Father     Stroke Sister        Social History     Tobacco Use    Smoking status: Never Smoker    Smokeless tobacco: Never Used   Substance Use Topics    Alcohol use: No      Current Outpatient Medications   Medication Sig Dispense Refill    atorvastatin (LIPITOR) 20 MG tablet TAKE 1 TABLET DAILY AT BEDTIME 90 tablet 3    pantoprazole (PROTONIX) 20 MG tablet Take 1 tablet by mouth every morning (before breakfast) 30 tablet 5    ranitidine (ZANTAC) 150 MG tablet Take 1 tablet by mouth 2 times daily 60 tablet 3    oxybutynin (DITROPAN-XL) 10 MG extended release tablet TAKE 1 TABLET DAILY 90 tablet 3    ketoconazole (NIZORAL) 2 % shampoo Apply topically daily as needed. 120 mL 5    finasteride (PROSCAR) 5 MG tablet TAKE 1 TABLET DAILY 90 tablet 3    Handicap Placard MISC by Does not apply route      polyethylene glycol (GLYCOLAX) powder Take 17 g by mouth daily      melatonin 5 MG TABS tablet Take 10 mg by mouth daily       docusate sodium (COLACE) 100 MG capsule Take 100 mg by mouth daily      diclofenac sodium (VOLTAREN) 1 % GEL Apply 4 g topically 4 times daily 2 g for small joints, 4 g for large joints 2 Tube 5     No current facility-administered medications for this visit.       No Known Allergies    Health Maintenance   Topic Date Due    DTaP/Tdap/Td vaccine (2 - Tdap) 12/02/2019    Potassium monitoring

## 2019-05-26 ASSESSMENT — ENCOUNTER SYMPTOMS
FLATUS: 0
BELCHING: 0
VOMITING: 0
HEMATOCHEZIA: 0
ABDOMINAL PAIN: 1
DIARRHEA: 1
NAUSEA: 0
CONSTIPATION: 0

## 2019-05-28 LAB
BASOPHILS # BLD: 0.03 THOU/MM3 (ref 0–0.3)
DIFFERENTIAL: AUTOMATED DIFF
EOSINOPHIL # BLD: 0.14 THOU/MM3 (ref 0–1.1)
HCT VFR BLD CALC: 36.1 % (ref 42–52)
HEMOGLOBIN: 11.9 G/DL (ref 13.8–17)
LYMPHOCYTES # BLD: 0.76 THOU/MM3 (ref 1–5.5)
MCH RBC QN AUTO: 32.5 PG (ref 28.5–32)
MCHC RBC AUTO-ENTMCNC: 32.9 G/DL (ref 32–37)
MCV RBC AUTO: 98.8 FL (ref 80–94)
MONOCYTES # BLD: 0.52 THOU/MM3 (ref 0.1–1)
NEUTROPHILS: 3.18 THOU/MM3 (ref 2–8.1)
PDW BLD-RTO: 11.1 % (ref 10–15)
PLATELET # BLD: 156 THOU/MM3 (ref 130–400)
PMV BLD AUTO: 6.3 FL (ref 7.4–11)
RBC # BLD: 3.65 M/UL (ref 4.7–6.1)
WBC # BLD: 4.64 THOU/ML3 (ref 4.8–10)

## 2019-05-30 ENCOUNTER — OFFICE VISIT (OUTPATIENT)
Dept: SURGERY | Age: 84
End: 2019-05-30
Payer: MEDICARE

## 2019-05-30 VITALS
SYSTOLIC BLOOD PRESSURE: 137 MMHG | HEART RATE: 57 BPM | TEMPERATURE: 97.9 F | BODY MASS INDEX: 30.61 KG/M2 | HEIGHT: 72 IN | DIASTOLIC BLOOD PRESSURE: 81 MMHG | WEIGHT: 226 LBS

## 2019-05-30 DIAGNOSIS — R10.10 PAIN OF UPPER ABDOMEN: Primary | ICD-10-CM

## 2019-05-30 DIAGNOSIS — K92.1 MELENA: ICD-10-CM

## 2019-05-30 PROCEDURE — 99203 OFFICE O/P NEW LOW 30 MIN: CPT | Performed by: SURGERY

## 2019-05-30 NOTE — PROGRESS NOTES
Tim Roach is a 81 y.o. male who presents today for evaluation of recent abdominal pain associated with melena and dark stools. The patient states approx 3 weeks ago he began to have upper abd pain and soon after started having dark stools and melena. The pain persisted for several days and he was seen by his PCP for this. After evaluation in his PCPs office he was taken off of his mobic and aspirin 325 which she had been taking for long-term. He states soon after stopping the medication his abdominal pain did seem to improve but he has continued to have dark stools. On further questioning he denies any heartburn, emesis, food intolerance. No prior history of similar symptoms. He has had colonoscopy with the most recent being in 2016 and there were no findings at that time. No family history of gastrointestinal cancers. He denies any significant caffeine use, no tobacco use and only occasional EtOH use. He has no prior history of ulcer disease. He is referred from his PCP for evaluation of his abdominal pain and dark stools. Patient has had recent lab work done which does show a slight anemia with his most recent hemoglobin being 11.2. Review of prior labs to show that he typically has normal hemoglobin levels.     Past Medical History:   Diagnosis Date    [facility] ([facility]     Cancer ([facility]     skin    Enlarged prostate     Hyperlipidemia     Thrombocytopenia (HCC)     borderline episodic       Past Surgical History:   Procedure Laterality Date    [facility] Fracture    [facility]    HIP SURGERY Right     ORIF after fall on the ice    HIP SURGERY Right 01/2015       Current Outpatient Medications   Medication Sig Dispense Refill    atorvastatin (LIPITOR) 20 MG tablet TAKE 1 TABLET DAILY AT BEDTIME 90 tablet 3    pantoprazole (PROTONIX) 20 MG tablet Take 1 tablet by mouth every morning (before breakfast) 30 tablet 5    ranitidine (ZANTAC) 150 MG tablet Take 1 tablet by mouth 2 times daily 60 tablet 3    oxybutynin (DITROPAN-XL) 10 MG extended release tablet TAKE 1 TABLET DAILY 90 tablet 3    finasteride (PROSCAR) 5 MG tablet TAKE 1 TABLET DAILY 90 tablet 3    melatonin 5 MG TABS tablet Take 10 mg by mouth daily       docusate sodium (COLACE) 100 MG capsule Take 100 mg by mouth daily      diclofenac sodium (VOLTAREN) 1 % GEL Apply 4 g topically 4 times daily 2 g for small joints, 4 g for large joints 2 Tube 5    ketoconazole (NIZORAL) 2 % shampoo Apply topically daily as needed. 120 mL 5    Handicap Placard MISC by Does not apply route      polyethylene glycol (GLYCOLAX) powder Take 17 g by mouth daily       No current facility-administered medications for this visit. No Known Allergies    Family History   Problem Relation Age of Onset    Stroke Father     Coronary Art Dis Father     Stroke Sister        Social History     Socioeconomic History    Marital status:      Spouse name: Not on file    Number of children: Not on file    Years of education: Not on file    Highest education level: Not on file   Occupational History    Not on file   Social Needs    Financial resource strain: Not on file    Food insecurity:     Worry: Not on file     Inability: Not on file    Transportation needs:     Medical: Not on file     Non-medical: Not on file   Tobacco Use    Smoking status: Never Smoker    Smokeless tobacco: Never Used   Substance and Sexual Activity    Alcohol use:  Yes    Drug use: Never    Sexual activity: Not on file   Lifestyle    Physical activity:     Days per week: Not on file     Minutes per session: Not on file    Stress: Not on file   Relationships    Social connections:     Talks on phone: Not on file     Gets together: Not on file     Attends Faith service: Not on file     Active member of club or organization: Not on file     Attends meetings of clubs or organizations: Not on file     Relationship status: Not on file    Intimate partner violence:     Fear of current or ex partner: Not on file     Emotionally abused: Not on file     Physically abused: Not on file     Forced sexual activity: Not on file   Other Topics Concern    Not on file   Social History Narrative    Not on file       ROS:   Review of Systems - General ROS: negative for - chills, fever or weight loss  Psychological ROS: negative  Ophthalmic ROS: negative  ENT ROS: negative  Allergy and Immunology ROS: negative  Hematological and Lymphatic ROS: negative  Endocrine ROS: negative  Respiratory ROS: no cough, shortness of breath, or wheezing  Cardiovascular ROS: no chest pain or dyspnea on exertion  Gastrointestinal ROS: per HPI  Genito-Urinary ROS: no dysuria, trouble voiding, or hematuria  Musculoskeletal ROS: negative      Objective   Vitals:    05/30/19 0859   BP: 137/81   Pulse: 57   Temp: 97.9 °F (36.6 °C)     General:in no apparent distress, well developed and well nourished, alert and oriented times 3  Eyes: PERRL, EOMI and Sclera nonicteric  Ears, Nose, Throat:  external ear and ear canal normal bilaterally, oropharynx clear and moist with normal mucous membranes  Neck: neck supple and non tender without mass  Lungs: clear to auscultation without wheezes or rales   Heart: S1S2, no mumurs, RRR  Abdomen: Soft, nontender, nondistended, bowel sounds present. negative  Extremity: negative  Neuro: CN II-XII grossly intact      Assessment     3  81year-old male with recent upper abdominal pain and melena. Pain now improved since stopping aspirin and Mobic      Plan     1. With the patient's symptoms and continued evidence of GI bleed I'm going to proceed with EGD for further evaluation. Risks of procedure including bleeding, infection, perforation, need for further surgery and anesthesia risks were discussed and written informed consent is obtained.   2.  Patient is advised to continue current acid blocking medications. Additionally he is advised to continue refraining from aspirin and NSAIDs.   3.  Plan for follow-up after procedure as needed based on findings    Electronically signed by Tristin Benavidez DO on 5/30/2019 at 9:37 AM      (Please note that portions of this note were completed with a voice recognition program.  Efforts were made to edit the dictations but occasionally words are mis-transcribed.)

## 2019-05-30 NOTE — PATIENT INSTRUCTIONS
AMBULATORY SURGERY INSTRUCTIONS    - If you should develop a cold, sore throat, cough, fever or other new indication of illness prior to the scheduled surgery, please notify the [address] [place] [address] office as early as possible. - DO NOT EAT OR DRINK ANYTHING AFTER MIDNIGHT THE NIGHT BEFORE YOUR SURGERY. This includes water, tea, juice, cereal, coffee, etc.    - Refrain from smoking the day of your surgery or procedure. - Wear simple loose clothing, which can be easily changed. - Do not wear any make-up, lipstick or nail polish. - Please leave contact lenses at home or bring container for them. - Leave jewelry (including rings) and other valuables at home. - Make arrangements for a family member or friend to drive you to and from the surgery center. The anesthetic may affect your judgement following surgery and driving a vehicle within 24 hours after the anesthesia could be dangerous. Please remember that a responsible adult must remain in the building at all times during your surgery. - Children should be accompanied by two adults; one to watch the child, the other to drive the vehicle home. - Please shower or bathe both on the evening prior to surgery and on the morning of surgery using an antibacterial soap/Hibiclens    - You may be asked to sign several forms prior to surgery; patients under age 25 have a parent or legal guardian sign the permit to operate.    - DO NOT take aspirin, Aleve, Motrin, Ibuprofen, Naproxen, Vitamins or Herbal supplements for 1 weeks or 5 days prior to the day of surgery.    -The morning of your procedure please take blood pressure, heart, and seizure medications with a small sip of water. Day of procedure report to the [place]:  ________________________________                                     Jhon Standard will be notified 1-3 business days before the procedure of arrival time by the surgery center.

## 2019-06-10 ENCOUNTER — OFFICE VISIT (OUTPATIENT)
Dept: FAMILY MEDICINE CLINIC | Age: 84
End: 2019-06-10
Payer: MEDICARE

## 2019-06-10 VITALS
SYSTOLIC BLOOD PRESSURE: 128 MMHG | WEIGHT: 224.2 LBS | DIASTOLIC BLOOD PRESSURE: 84 MMHG | BODY MASS INDEX: 30.41 KG/M2 | OXYGEN SATURATION: 95 % | HEART RATE: 51 BPM

## 2019-06-10 DIAGNOSIS — K29.01 GASTROINTESTINAL HEMORRHAGE ASSOCIATED WITH ACUTE GASTRITIS: Primary | ICD-10-CM

## 2019-06-10 DIAGNOSIS — D50.0 IRON DEFICIENCY ANEMIA DUE TO CHRONIC BLOOD LOSS: ICD-10-CM

## 2019-06-10 PROCEDURE — 99213 OFFICE O/P EST LOW 20 MIN: CPT | Performed by: FAMILY MEDICINE

## 2019-06-10 RX ORDER — ACETAMINOPHEN 500 MG/1
TABLET, FILM COATED ORAL
COMMUNITY
End: 2019-06-10 | Stop reason: ALTCHOICE

## 2019-06-10 RX ORDER — AMOXICILLIN 500 MG/1
TABLET, FILM COATED ORAL
COMMUNITY
End: 2020-09-11

## 2019-06-10 NOTE — PROGRESS NOTES
[address] [address] [address]  Dept: [phone]  Dept GIF:[phone]    Melany Moses is a 81 y.o. male who presents today for his medical conditions/complaints as notedbelow. Melany Moses is c/o of Abdominal Pain (3 week follow, had endoscopy. denies any further abdominal pain and stools seem back to normal. had labs done also. would like to know pathology reports, in media.      )      HPI:     HPI  3 weeks prior had episodes of black stool. Was not having significant abd pain but was having tenderness to palpation. Started on pantoprazole, ranitidine and then the stools have gone back to normal.  Tenderness on the abd has resolved, eating and drinking normally, no lightheadedness at all. No SOB,no COOMBS. EGD was done, showed diffuse gastritis no active ulcerations  Has bilateral eye irritation at times. No injury, no change in vision. No drainage  BP Readings from Last 3 Encounters:   06/10/19 128/84   05/30/19 137/81   05/22/19 128/82          (goal 120/80)    Wt Readings from Last 3 Encounters:   06/10/19 224 lb 3.2 oz (101.7 kg)   05/30/19 226 lb (102.5 kg)   05/22/19 229 lb 3.2 oz (104 kg)        Past Medical History:   Diagnosis Date    C2 cervical fracture (HCC)     Cancer ([address]     skin    Enlarged prostate     Hyperlipidemia     Thrombocytopenia (HCC)     borderline episodic      Past Surgical History:   Procedure Laterality Date    [facility]  [facility] C2 Fracture    [facility]  09/2016    [facility]    HIP SURGERY Right     ORIF after fall on the ice    HIP SURGERY Right 01/2015    UPPER GASTROINTESTINAL ENDOSCOPY  06/03/2019       Family History   Problem Relation Age of Onset    Stroke Father     Coronary Art Dis Father     Stroke Sister        Social History     Tobacco Use    Smoking status: Never Smoker    Smokeless tobacco: Never Used   Substance Use Topics    Alcohol use:  Yes Current Outpatient Medications   Medication Sig Dispense Refill    Amoxicillin 500 MG TABS Prior to dental procedures      atorvastatin (LIPITOR) 20 MG tablet TAKE 1 TABLET DAILY AT BEDTIME 90 tablet 3    pantoprazole (PROTONIX) 20 MG tablet Take 1 tablet by mouth every morning (before breakfast) 30 tablet 5    ranitidine (ZANTAC) 150 MG tablet Take 1 tablet by mouth 2 times daily 60 tablet 3    oxybutynin (DITROPAN-XL) 10 MG extended release tablet TAKE 1 TABLET DAILY 90 tablet 3    ketoconazole (NIZORAL) 2 % shampoo Apply topically daily as needed. 120 mL 5    finasteride (PROSCAR) 5 MG tablet TAKE 1 TABLET DAILY 90 tablet 3    Handicap Placard MISC by Does not apply route      polyethylene glycol (GLYCOLAX) powder Take 17 g by mouth daily      melatonin 5 MG TABS tablet Take 10 mg by mouth daily       docusate sodium (COLACE) 100 MG capsule Take 100 mg by mouth daily      diclofenac sodium 1 % GEL Apply 2 g topically every 6 hours as needed  0    diclofenac sodium (VOLTAREN) 1 % GEL Apply 4 g topically 4 times daily 2 g for small joints, 4 g for large joints 2 Tube 5     No current facility-administered medications for this visit. No Known Allergies    Health Maintenance   Topic Date Due    DTaP/Tdap/Td vaccine (2 - Tdap) 12/02/2019    Potassium monitoring  05/18/2020    Creatinine monitoring  05/18/2020    Flu vaccine  Completed    Shingles Vaccine  Completed    Pneumococcal 65+ years Vaccine  Completed       Subjective:      Review of Systems    Objective:     /84   Pulse 51   Wt 224 lb 3.2 oz (101.7 kg)   SpO2 95%   BMI 30.41 kg/m²     Physical Exam   Constitutional: He is oriented to person, place, and time. He appears well-developed and well-nourished. HENT:   Head: Normocephalic and atraumatic. Eyes: EOM and lids are normal. Right conjunctiva is not injected. Left conjunctiva is not injected. Minimal inflammation of the conjunctiva    Neck: Normal range of motion. Neck supple. Cardiovascular: Normal rate, regular rhythm and intact distal pulses. Exam reveals no gallop and no friction rub. Murmur (R>L 3/6 RAYNA) heard. Pulmonary/Chest: Effort normal and breath sounds normal. No respiratory distress. Abdominal: Soft. He exhibits no distension and no mass. There is no tenderness. There is no guarding. Musculoskeletal: He exhibits edema (trace with chrnoic brawny changes and varicosities). Lymphadenopathy:     He has no cervical adenopathy. Neurological: He is alert and oriented to person, place, and time. Skin: Skin is warm. Capillary refill takes less than 2 seconds. Psychiatric: He has a normal mood and affect. His behavior is normal. Judgment and thought content normal.   Nursing note and vitals reviewed. Assessment/Plan:      Diagnosis Orders   1. Gastrointestinal hemorrhage associated with acute gastritis  CBC   2. Iron deficiency anemia due to chronic blood loss  CBC     Will continue on the pantoprazole and the ranitidine. Avoid Nsaids and ASA as likely contributed to the issue. If no further sx in 3 months then will consider wean at that time. Lab Results   Component Value Date    WBC 4.64 (L) 05/28/2019    HGB 11.9 (L) 05/28/2019    HCT 36.1 (L) 05/28/2019     05/28/2019    CHOL 146 08/09/2018    TRIG 42 08/09/2018    HDL 54 08/05/2016    ALT 29 08/09/2018    AST 26 08/09/2018     05/18/2019    K 4.6 05/18/2019     05/18/2019    CREATININE 0.7 05/18/2019    BUN 34 (H) 05/18/2019    CO2 30 05/18/2019     Use the systane ultra for the eye irritation twice daily  No follow-ups on file. Patient given educational materials - see patientinstructions. Discussed use, benefit, and side effects of prescribed medications. All patient questions answered. Pt voiced understanding. Reviewed health maintenance. Instructed to continue current medications, diet andexercise. Patient agreed with treatment plan. Follow up as directed. Electronically signed by Severo De Santiago MD on 6/16/2019

## 2019-07-24 ENCOUNTER — OFFICE VISIT (OUTPATIENT)
Dept: FAMILY MEDICINE CLINIC | Age: 84
End: 2019-07-24
Payer: MEDICARE

## 2019-07-24 VITALS
SYSTOLIC BLOOD PRESSURE: 136 MMHG | BODY MASS INDEX: 30.65 KG/M2 | HEART RATE: 62 BPM | WEIGHT: 226 LBS | OXYGEN SATURATION: 95 % | DIASTOLIC BLOOD PRESSURE: 88 MMHG

## 2019-07-24 DIAGNOSIS — M47.812 NECK ARTHRITIS: ICD-10-CM

## 2019-07-24 DIAGNOSIS — M54.2 NECK PAIN: Primary | ICD-10-CM

## 2019-07-24 PROCEDURE — 99213 OFFICE O/P EST LOW 20 MIN: CPT | Performed by: FAMILY MEDICINE

## 2019-07-24 NOTE — PATIENT INSTRUCTIONS
Patient Education        Neck: Exercises  Your Care Instructions  Here are some examples of typical rehabilitation exercises for your condition. Start each exercise slowly. Ease off the exercise if you start to have pain. Your doctor or physical therapist will tell you when you can start these exercises and which ones will work best for you. How to do the exercises  Neck stretch    1. This stretch works best if you keep your shoulder down as you lean away from it. To help you remember to do this, start by relaxing your shoulders and lightly holding on to your thighs or your chair. 2. Tilt your head toward your shoulder and hold for 15 to 30 seconds. Let the weight of your head stretch your muscles. 3. If you would like a little added stretch, use your hand to gently and steadily pull your head toward your shoulder. For example, keeping your right shoulder down, lean your head to the left. 4. Repeat 2 to 4 times toward each shoulder. Diagonal neck stretch    1. Turn your head slightly toward the direction you will be stretching, and tilt your head diagonally toward your chest and hold for 15 to 30 seconds. 2. If you would like a little added stretch, use your hand to gently and steadily pull your head forward on the diagonal.  3. Repeat 2 to 4 times toward each side. Dorsal glide stretch    1. Sit or stand tall and look straight ahead. 2. Slowly tuck your chin as you glide your head backward over your body  3. Hold for a count of 6, and then relax for up to 10 seconds. 4. Repeat 8 to 12 times. Chest and shoulder stretch    1. Sit or stand tall and glide your head backward as in the dorsal glide stretch. 2. Raise both arms so that your hands are next to your ears. 3. Take a deep breath, and as you breathe out, lower your elbows down and behind your back.  You will feel your shoulder blades slide down and together, and at the same time you will feel a stretch across your chest and the front of your shoulders. 4. Hold for about 6 seconds, and then relax for up to 10 seconds. 5. Repeat 8 to 12 times. Strengthening: Hands on head    1. Move your head backward, forward, and side to side against gentle pressure from your hands, holding each position for about 6 seconds. 2. Repeat 8 to 12 times. Follow-up care is a key part of your treatment and safety. Be sure to make and go to all appointments, and call your doctor if you are having problems. It's also a good idea to know your test results and keep a list of the medicines you take. Where can you learn more? Go to [email or web address] and sign in to your [employer] account. Enter P975 in the 58.com box to learn more about \"Neck: Exercises. \"     If you do not have an account, please click on the \"Sign Up Now\" link. Current as of: September 20, 2018  Content Version: 12.0  © 3041-0596 [employer], Inc[employer]. Care instructions adapted under license by [facility] ([facility]). If you have questions about a medical condition or this instruction, always ask your healthcare professional. [address] any warranty or liability for your use of this information.

## 2019-08-21 LAB
AGE FOR GFR: 82
ANION GAP SERPL CALCULATED.3IONS-SCNC: 10 MMOL/L
BASOPHILS # BLD: 0.05 THOU/MM3 (ref 0–0.3)
BUN BLDV-MCNC: 20 MG/DL (ref 9–20)
CALCIUM SERPL-MCNC: 9.3 MG/DL (ref 8.4–10.2)
CHLORIDE BLD-SCNC: 107 MMOL/L (ref 98–120)
CHOLESTEROL/HDL RATIO: 3.1 RATIO (ref 0–4.5)
CHOLESTEROL: 153 MG/DL (ref 50–200)
CO2: 28 MMOL/L (ref 22–31)
CREAT SERPL-MCNC: 0.9 MG/DL (ref 0.7–1.3)
DIFFERENTIAL: AUTOMATED DIFF
EGFR BF: 73 ML/MIN/1.73 M2
EGFR BM: 98 ML/MIN/1.73 M2
EGFR WF: 60 ML/MIN/1.73 M2
EGFR WM: 81 ML/MIN/1.73 M2
EOSINOPHIL # BLD: 0.19 THOU/MM3 (ref 0–1.1)
GLUCOSE: 89 MG/DL (ref 75–110)
HCT VFR BLD CALC: 40 % (ref 42–52)
HDL, DIRECT: 50 MG/DL (ref 36–68)
HEMOGLOBIN: 13 G/DL (ref 13.8–17)
LDL CHOLESTEROL CALCULATED: 91 MG/DL (ref 0–160)
LYMPHOCYTES # BLD: 0.81 THOU/MM3 (ref 1–5.5)
MCH RBC QN AUTO: 30.6 PG (ref 28.5–32)
MCHC RBC AUTO-ENTMCNC: 32.5 G/DL (ref 32–37)
MCV RBC AUTO: 94.3 FL (ref 80–94)
MONOCYTES # BLD: 0.52 THOU/MM3 (ref 0.1–1)
NEUTROPHILS: 2.63 THOU/MM3 (ref 2–8.1)
PDW BLD-RTO: 12 % (ref 10–15)
PLATELET # BLD: 150 THOU/MM3 (ref 130–400)
PMV BLD AUTO: 6.2 FL (ref 7.4–11)
POTASSIUM SERPL-SCNC: 4.4 MMOL/L (ref 3.6–5)
RBC # BLD: 4.23 M/UL (ref 4.7–6.1)
SODIUM BLD-SCNC: 141 MMOL/L (ref 135–145)
TRIGL SERPL-MCNC: 60 MG/DL (ref 10–250)
VLDLC SERPL CALC-MCNC: 12 MG/DL (ref 0–40)
WBC # BLD: 4.19 THOU/ML3 (ref 4.8–10)

## 2019-08-29 ENCOUNTER — OFFICE VISIT (OUTPATIENT)
Dept: FAMILY MEDICINE CLINIC | Age: 84
End: 2019-08-29
Payer: MEDICARE

## 2019-08-29 VITALS
WEIGHT: 226 LBS | HEART RATE: 62 BPM | TEMPERATURE: 98.7 F | BODY MASS INDEX: 30.61 KG/M2 | DIASTOLIC BLOOD PRESSURE: 84 MMHG | SYSTOLIC BLOOD PRESSURE: 124 MMHG | HEIGHT: 72 IN

## 2019-08-29 DIAGNOSIS — I10 ESSENTIAL HYPERTENSION: Primary | ICD-10-CM

## 2019-08-29 DIAGNOSIS — E78.2 MIXED HYPERLIPIDEMIA: ICD-10-CM

## 2019-08-29 DIAGNOSIS — Z23 IMMUNIZATION DUE: ICD-10-CM

## 2019-08-29 DIAGNOSIS — D50.0 IRON DEFICIENCY ANEMIA DUE TO CHRONIC BLOOD LOSS: ICD-10-CM

## 2019-08-29 PROCEDURE — 99214 OFFICE O/P EST MOD 30 MIN: CPT | Performed by: FAMILY MEDICINE

## 2019-08-29 PROCEDURE — G0008 ADMIN INFLUENZA VIRUS VAC: HCPCS | Performed by: FAMILY MEDICINE

## 2019-08-29 PROCEDURE — 90662 IIV NO PRSV INCREASED AG IM: CPT | Performed by: FAMILY MEDICINE

## 2019-08-29 RX ORDER — ATORVASTATIN CALCIUM 20 MG/1
TABLET, FILM COATED ORAL
Qty: 90 TABLET | Refills: 3 | Status: SHIPPED | OUTPATIENT
Start: 2019-08-29 | End: 2020-07-06 | Stop reason: SDUPTHER

## 2019-08-29 ASSESSMENT — ENCOUNTER SYMPTOMS: SHORTNESS OF BREATH: 0

## 2019-08-29 NOTE — PROGRESS NOTES
[address] [address]  Dept: [phone]  Dept [facility]:[phone]    Boogie Buckner is a 81 y.o. male who presents today for his medical conditions/complaints as notedbelow. Boogie Buckner is c/o of Hypertension and Flu Vaccine      HPI:     Hypertension   This is a chronic problem. The problem is unchanged. The problem is controlled. Associated symptoms include neck pain (Had previously but has been doing the stretches and has been much improved). Pertinent negatives include no anxiety, blurred vision, chest pain, headaches, malaise/fatigue, orthopnea, palpitations, peripheral edema, PND, shortness of breath or sweats. Had bleeding ulcer earlier this summer. Has not had any symptoms at all since that time. Has not had any dark stools. Has repeat CBC showed very minor anemia still persists. But definitely improved from the previous. Has avoiding any nonsteroidals since his ulcer. Has been taking his pantoprazole and is renewed ranitidine regularly.     BP Readings from Last 3 Encounters:   08/29/19 124/84   07/24/19 136/88   06/10/19 128/84          (goal 120/80)    Wt Readings from Last 3 Encounters:   08/29/19 226 lb (102.5 kg)   07/24/19 226 lb (102.5 kg)   06/10/19 224 lb 3.2 oz (101.7 kg)        Past Medical History:   Diagnosis Date    C2 cervical fracture (HCC)     Cancer ([address]     skin    Enlarged prostate     Hyperlipidemia     Thrombocytopenia (HCC)     borderline episodic      Past Surgical History:   Procedure Laterality Date    CERV[facility] SP[facility]  [facility]/29/2016    [facility] C2 Fracture    [facility]    HIP SURGERY Right     ORIF after fall on the ice    HIP SURGERY Right 01/2015    UPPER GASTROINTESTINAL ENDOSCOPY  06/03/2019       Family History   Problem Relation Age of Onset    Stroke Father     Coronary Art Dis Father     Stroke Sister        Social History     Tobacco Use    Smoking

## 2019-08-29 NOTE — PATIENT INSTRUCTIONS
Start a once daily iron with food to help with the mild anemia. This is improving, but slowly.   Add in the Slow FE over the counter for 1-2 months

## 2019-09-01 ASSESSMENT — ENCOUNTER SYMPTOMS
SHORTNESS OF BREATH: 0
ORTHOPNEA: 0
BLURRED VISION: 0

## 2019-09-03 DIAGNOSIS — N40.0 BENIGN PROSTATIC HYPERPLASIA, UNSPECIFIED WHETHER LOWER URINARY TRACT SYMPTOMS PRESENT: ICD-10-CM

## 2019-09-04 RX ORDER — FINASTERIDE 5 MG/1
TABLET, FILM COATED ORAL
Qty: 90 TABLET | Refills: 4 | Status: SHIPPED | OUTPATIENT
Start: 2019-09-04 | End: 2020-07-06 | Stop reason: SDUPTHER

## 2019-09-04 NOTE — TELEPHONE ENCOUNTER
Jeannette Harding is calling to request a refill on the following medication(s):  Requested Prescriptions     Pending Prescriptions Disp Refills    finasteride (PROSCAR) 5 MG tablet [Pharmacy Med Name: FINASTERIDE 5MG TABS 5MG] 90 tablet 4     Sig: TAKE 1 TABLET DAILY       Last Visit Date (If Applicable):  0/10/0382    Next Visit Date:    3/5/2020

## 2019-11-06 ENCOUNTER — TELEPHONE (OUTPATIENT)
Dept: FAMILY MEDICINE CLINIC | Age: 84
End: 2019-11-06

## 2019-12-04 ENCOUNTER — OFFICE VISIT (OUTPATIENT)
Dept: FAMILY MEDICINE CLINIC | Age: 84
End: 2019-12-04
Payer: MEDICARE

## 2019-12-04 VITALS
SYSTOLIC BLOOD PRESSURE: 136 MMHG | BODY MASS INDEX: 31.19 KG/M2 | WEIGHT: 230 LBS | HEART RATE: 65 BPM | DIASTOLIC BLOOD PRESSURE: 72 MMHG | OXYGEN SATURATION: 94 %

## 2019-12-04 DIAGNOSIS — R22.31 LOCALIZED SWELLING, MASS, OR LUMP OF UPPER EXTREMITY, RIGHT: Primary | ICD-10-CM

## 2019-12-04 PROCEDURE — 99213 OFFICE O/P EST LOW 20 MIN: CPT | Performed by: FAMILY MEDICINE

## 2019-12-06 ENCOUNTER — TELEPHONE (OUTPATIENT)
Dept: FAMILY MEDICINE CLINIC | Age: 84
End: 2019-12-06

## 2019-12-06 DIAGNOSIS — R22.31 LOCALIZED SWELLING, MASS, OR LUMP OF UPPER EXTREMITY, RIGHT: Primary | ICD-10-CM

## 2019-12-09 DIAGNOSIS — R22.31 LOCALIZED SWELLING, MASS, OR LUMP OF UPPER EXTREMITY, RIGHT: ICD-10-CM

## 2019-12-10 ENCOUNTER — OFFICE VISIT (OUTPATIENT)
Dept: SURGERY | Age: 84
End: 2019-12-10
Payer: MEDICARE

## 2019-12-10 VITALS
TEMPERATURE: 97.4 F | WEIGHT: 228.1 LBS | SYSTOLIC BLOOD PRESSURE: 135 MMHG | DIASTOLIC BLOOD PRESSURE: 87 MMHG | BODY MASS INDEX: 31.93 KG/M2 | HEART RATE: 65 BPM | HEIGHT: 71 IN

## 2019-12-10 DIAGNOSIS — R22.31 SUBCUTANEOUS MASS OF RIGHT FOREARM: Primary | ICD-10-CM

## 2019-12-10 PROCEDURE — 99213 OFFICE O/P EST LOW 20 MIN: CPT | Performed by: SURGERY

## 2019-12-10 SDOH — HEALTH STABILITY: MENTAL HEALTH: HOW OFTEN DO YOU HAVE A DRINK CONTAINING ALCOHOL?: MONTHLY OR LESS

## 2020-01-07 ENCOUNTER — OFFICE VISIT (OUTPATIENT)
Dept: SURGERY | Age: 85
End: 2020-01-07

## 2020-01-07 VITALS — TEMPERATURE: 97.2 F | HEART RATE: 55 BPM | SYSTOLIC BLOOD PRESSURE: 161 MMHG | DIASTOLIC BLOOD PRESSURE: 97 MMHG

## 2020-01-07 PROCEDURE — 99024 POSTOP FOLLOW-UP VISIT: CPT | Performed by: SURGERY

## 2020-01-09 ENCOUNTER — NURSE ONLY (OUTPATIENT)
Dept: FAMILY MEDICINE CLINIC | Age: 85
End: 2020-01-09
Payer: MEDICARE

## 2020-01-09 VITALS
BODY MASS INDEX: 32.08 KG/M2 | DIASTOLIC BLOOD PRESSURE: 72 MMHG | WEIGHT: 230 LBS | SYSTOLIC BLOOD PRESSURE: 134 MMHG | OXYGEN SATURATION: 96 % | HEART RATE: 60 BPM

## 2020-01-09 NOTE — PROGRESS NOTES
Patient states he had a couple procedures recently and was told that his bp was a little elevated so he stopped to have it checked. Denies any issues. Feels fine. Is taking all meds as on the med list. /72.

## 2020-01-20 ENCOUNTER — OFFICE VISIT (OUTPATIENT)
Dept: FAMILY MEDICINE CLINIC | Age: 85
End: 2020-01-20
Payer: MEDICARE

## 2020-01-20 VITALS
SYSTOLIC BLOOD PRESSURE: 134 MMHG | OXYGEN SATURATION: 93 % | BODY MASS INDEX: 32.15 KG/M2 | DIASTOLIC BLOOD PRESSURE: 86 MMHG | HEART RATE: 67 BPM | WEIGHT: 230.5 LBS

## 2020-01-20 PROCEDURE — 99214 OFFICE O/P EST MOD 30 MIN: CPT | Performed by: FAMILY MEDICINE

## 2020-01-20 PROCEDURE — 99211 OFF/OP EST MAY X REQ PHY/QHP: CPT

## 2020-01-20 PROCEDURE — 93005 ELECTROCARDIOGRAM TRACING: CPT | Performed by: FAMILY MEDICINE

## 2020-01-20 PROCEDURE — 93010 ELECTROCARDIOGRAM REPORT: CPT | Performed by: FAMILY MEDICINE

## 2020-01-20 RX ORDER — PANTOPRAZOLE SODIUM 20 MG/1
20 TABLET, DELAYED RELEASE ORAL
Qty: 30 TABLET | Refills: 5
Start: 2020-01-20 | End: 2020-01-20 | Stop reason: SDUPTHER

## 2020-01-20 RX ORDER — PANTOPRAZOLE SODIUM 20 MG/1
20 TABLET, DELAYED RELEASE ORAL
Qty: 30 TABLET | Refills: 5 | Status: SHIPPED | OUTPATIENT
Start: 2020-01-20 | End: 2020-01-20

## 2020-01-20 NOTE — TELEPHONE ENCOUNTER
Ahmet Greene is requesting a refill on the following medication(s):  Requested Prescriptions     Pending Prescriptions Disp Refills    pantoprazole (PROTONIX) 20 MG tablet 30 tablet 5     Sig: Take 1 tablet by mouth every morning (before breakfast)       Last Visit Date (If Applicable):  3/90/4577    Next Visit Date:    3/5/2020      Patient checked at home and does not have this medication. He needs this sent to the pharmacy.

## 2020-01-20 NOTE — TELEPHONE ENCOUNTER
Erna Sanches is requesting a refill on the following medication(s):  Requested Prescriptions     Pending Prescriptions Disp Refills    pantoprazole (PROTONIX) 20 MG tablet [Pharmacy Med Name: PANTOPRAZOLE SOD DR 20 MG TAB] 90 tablet 0     Sig: take 1 tablet by mouth every morning before breakfast       Last Visit Date (If Applicable):  6/63/1481    Next Visit Date:    3/5/2020

## 2020-01-20 NOTE — PROGRESS NOTES
01/2015    UPPER GASTROINTESTINAL ENDOSCOPY  06/03/2019       Family History   Problem Relation Age of Onset    Stroke Father     Coronary Art Dis Father     Stroke Sister        Social History     Tobacco Use    Smoking status: Never Smoker    Smokeless tobacco: Never Used   Substance Use Topics    Alcohol use: Yes     Frequency: Monthly or less      Current Outpatient Medications   Medication Sig Dispense Refill    pantoprazole (PROTONIX) 20 MG tablet Take 1 tablet by mouth every morning (before breakfast) 30 tablet 5    Elastic Bandages & Supports (MEDICAL COMPRESSION STOCKINGS) MISC 2 each by Does not apply route daily 3 pairs compression stockings. Diagnosis:  Varicosities, lower leg edema. Type:  Knee high  Compression:  15-20 Length of need:  Lifetime (99 = lifetime). 2 each 0    finasteride (PROSCAR) 5 MG tablet TAKE 1 TABLET DAILY 90 tablet 4    atorvastatin (LIPITOR) 20 MG tablet TAKE 1 TABLET DAILY AT BEDTIME 90 tablet 3    diclofenac sodium 1 % GEL Apply 2 g topically every 6 hours as needed  0    Amoxicillin 500 MG TABS Prior to dental procedures      ranitidine (ZANTAC) 150 MG tablet Take 1 tablet by mouth 2 times daily 60 tablet 3    oxybutynin (DITROPAN-XL) 10 MG extended release tablet TAKE 1 TABLET DAILY 90 tablet 3    ketoconazole (NIZORAL) 2 % shampoo Apply topically daily as needed. 120 mL 5    Handicap Placard MISC by Does not apply route      polyethylene glycol (GLYCOLAX) powder Take 17 g by mouth daily      melatonin 5 MG TABS tablet Take 10 mg by mouth daily       docusate sodium (COLACE) 100 MG capsule Take 100 mg by mouth daily       No current facility-administered medications for this visit.       No Known Allergies    Health Maintenance   Topic Date Due    DTaP/Tdap/Td vaccine (2 - Tdap) 12/02/2019    Annual Wellness Visit (AWV)  02/28/2020    Lipid screen  08/21/2020    Potassium monitoring  08/21/2020    Creatinine monitoring  08/21/2020    Flu vaccine Completed    Shingles Vaccine  Completed    Pneumococcal 65+ years Vaccine  Completed       Subjective:      Review of Systems    Objective:     /86   Pulse 67   Wt 230 lb 8 oz (104.6 kg)   SpO2 93%   BMI 32.15 kg/m²     Physical Exam  Vitals signs and nursing note reviewed. Constitutional:       Appearance: He is well-developed. HENT:      Head: Normocephalic and atraumatic. Eyes:      General: Lids are normal.      Conjunctiva/sclera:      Right eye: Right conjunctiva is not injected. Left eye: Left conjunctiva is not injected. Neck:      Musculoskeletal: Normal range of motion and neck supple. Cardiovascular:      Rate and Rhythm: Normal rate and regular rhythm. Heart sounds: Murmur (R>L 3/6 RAYNA) present. No friction rub. No gallop. Pulmonary:      Effort: Pulmonary effort is normal. No respiratory distress. Breath sounds: Normal breath sounds. Abdominal:      General: There is no distension. Palpations: Abdomen is soft. There is no mass. Tenderness: There is no tenderness. There is no guarding. Genitourinary:      Musculoskeletal:      Right lower leg: Edema (Edema bilaterally significant lower extremity varicosities) present. Left lower leg: Edema present. Comments: Moderate kyphosis is noted thoracic   Lymphadenopathy:      Cervical: No cervical adenopathy. Skin:     General: Skin is warm. Capillary Refill: Capillary refill takes less than 2 seconds. Neurological:      Mental Status: He is alert and oriented to person, place, and time. Cranial Nerves: No cranial nerve deficit. Coordination: Coordination normal.      Gait: Gait abnormal (Using cane regularly). Psychiatric:         Mood and Affect: Mood normal.         Behavior: Behavior normal.         Thought Content: Thought content normal.         Judgment: Judgment normal.         Assessment/Plan:      Diagnosis Orders   1.  Right inguinal hernia  Tammy Rosario DO, General Surgery, [place]    US Scrotum and Testicles   2. Scrotal swelling  US Scrotum and Testicles   3. Non-recurrent unilateral inguinal hernia without obstruction or gangrene     4. Essential hypertension  EKG 12 Lead   5. Gastrointestinal hemorrhage associated with acute gastritis  pantoprazole (PROTONIX) 20 MG tablet   6. Epigastric pain  pantoprazole (PROTONIX) 20 MG tablet   7. Cough       Will go ahead and have him do an ultrasound of the groin just to rule out any significant masses and unable to appreciate but this does appear to be a large inguinal hernia. Refer to Dr. Amanda Owens to have evaluation for hernia repair. In the interim try to avoid heavy lifting and can wear either brace or jockstrap for comfort. Reviewed signs and symptoms of incarceration of the hernia. Would recommend an EKG today and preop testing. EKG today is unchanged from that from 5 years ago-- he is cleared for a low risk procedure such as a hernia repair if indicated. Would consider a CXR if the cough has not resolved at that time preop. With the dry cough that he occasionally has at night suspect this could be related to the acid reflux. He has been off of the pantoprazole he had been taking in the past (stopped this fall) as he had been doing well. I would try restarting this and see if this takes care of the cough. Also raised the head of the bed and avoid eating prior to bedtime. If this does not take care of the occasional dry cough at night then we will need to investigate further. Lab Results   Component Value Date    WBC 4.19 (L) 08/21/2019    HGB 13.0 (L) 08/21/2019    HCT 40.0 (L) 08/21/2019     08/21/2019    CHOL 153 08/21/2019    TRIG 60 08/21/2019    HDL 54 08/05/2016    ALT 29 08/09/2018    AST 26 08/09/2018     08/21/2019    K 4.4 08/21/2019     08/21/2019    CREATININE 0.9 08/21/2019    BUN 20 08/21/2019    CO2 28 08/21/2019       Return for As scheduled.         Patient

## 2020-01-21 ENCOUNTER — OFFICE VISIT (OUTPATIENT)
Dept: SURGERY | Age: 85
End: 2020-01-21
Payer: MEDICARE

## 2020-01-21 VITALS
DIASTOLIC BLOOD PRESSURE: 88 MMHG | HEART RATE: 70 BPM | SYSTOLIC BLOOD PRESSURE: 144 MMHG | TEMPERATURE: 97.5 F | HEIGHT: 71 IN | BODY MASS INDEX: 32.48 KG/M2 | WEIGHT: 232 LBS

## 2020-01-21 PROCEDURE — 99213 OFFICE O/P EST LOW 20 MIN: CPT | Performed by: SURGERY

## 2020-01-21 RX ORDER — PANTOPRAZOLE SODIUM 20 MG/1
TABLET, DELAYED RELEASE ORAL
Qty: 90 TABLET | Refills: 3 | Status: SHIPPED | OUTPATIENT
Start: 2020-01-21 | End: 2020-07-06 | Stop reason: SDUPTHER

## 2020-01-21 NOTE — PROGRESS NOTES
ranitidine (ZANTAC) 150 MG tablet Take 1 tablet by mouth 2 times daily 60 tablet 3    oxybutynin (DITROPAN-XL) 10 MG extended release tablet TAKE 1 TABLET DAILY 90 tablet 3    ketoconazole (NIZORAL) 2 % shampoo Apply topically daily as needed. 120 mL 5    Handicap Placard MISC by Does not apply route      polyethylene glycol (GLYCOLAX) powder Take 17 g by mouth daily      melatonin 5 MG TABS tablet Take 10 mg by mouth daily       docusate sodium (COLACE) 100 MG capsule Take 100 mg by mouth daily       No current facility-administered medications for this visit.         No Known Allergies    Family History   Problem Relation Age of Onset    Stroke Father     Coronary Art Dis Father     Stroke Sister        Social History     Socioeconomic History    Marital status:      Spouse name: Not on file    Number of children: Not on file    Years of education: Not on file    Highest education level: Not on file   Occupational History    Not on file   Social Needs    Financial resource strain: Not on file    Food insecurity:     Worry: Not on file     Inability: Not on file    Transportation needs:     Medical: Not on file     Non-medical: Not on file   Tobacco Use    Smoking status: Never Smoker    Smokeless tobacco: Never Used   Substance and Sexual Activity    Alcohol use: Yes     Frequency: Monthly or less    Drug use: Never    Sexual activity: Not on file   Lifestyle    Physical activity:     Days per week: Not on file     Minutes per session: Not on file    Stress: Not on file   Relationships    Social connections:     Talks on phone: Not on file     Gets together: Not on file     Attends [employer] service: Not on file     Active member of club or organization: Not on file     Attends meetings of clubs or organizations: Not on file     Relationship status: Not on file    Intimate partner violence:     Fear of current or ex partner: Not on file     Emotionally abused: Not on file Physically abused: Not on file     Forced sexual activity: Not on file   Other Topics Concern    Not on file   Social History Narrative    Not on file       ROS:   Review of Systems - General ROS: negative  Psychological ROS: negative  Ophthalmic ROS: negative  ENT ROS: negative  Allergy and Immunology ROS: negative  Hematological and Lymphatic ROS: negative  Endocrine ROS: negative  Respiratory ROS: no cough, shortness of breath, or wheezing  Cardiovascular ROS: no chest pain or dyspnea on exertion  Gastrointestinal ROS: per HPI  Genito-Urinary ROS: per HPI  Musculoskeletal ROS: negative      Objective   Vitals:    01/21/20 1402   BP: (!) 144/88   Pulse: 70   Temp: 97.5 °F (36.4 °C)     General:in no apparent distress, well developed and well nourished, alert and oriented times 3  Eyes: No gross abnormalities. Ears, Nose, Throat: hearing grossly normal bilaterally  Neck: neck supple and non tender without mass  Lungs: clear to auscultation without wheezes or rales   Heart: S1S2, no mumurs, RRR  Abdomen: Tender, nontender to palpation, bowel sounds present. The right hemiscrotum appears swollen. This is soft to palpation and there does not seem to be any abdominal contents in the scrotum or within the inguinal canal.  No evidence of inguinal hernia on exam and with Valsalva. The swelling in the right scrotum does transilluminate on exam.  Extremity: negative  Neuro: CN II-XII grossly intact    US scrotum images and read reviewed. Consistent with large right hydrocele. Also showing possible left hydrocele and varicocele. Assessment     3  20-year-old male with painless swelling of the right scrotum - exam and imaging consistent with hydrocele. Plan     1. As the patient has painless hydrocele on the right I do not feel that there is any urgent interventions that are required.   However I am going to have him see urology to discuss further management with conservative issues versus surgical intervention. No evidence of hernia on exam today so no further general surgical interventions are planned at this time. Patient may follow-up with general surgery as needed. We have contacted urology office and they are able to see him tomorrow morning and the appointment was scheduled. Continue regular follow-up with PCP.     Electronically signed by Tisha Cordero DO on 1/21/2020 at 2:34 PM      (Please note that portions of this note were completed with a voice recognition program.  Efforts were made to edit the dictations but occasionally words are mis-transcribed.)

## 2020-01-22 ENCOUNTER — OFFICE VISIT (OUTPATIENT)
Dept: UROLOGY | Age: 85
End: 2020-01-22
Payer: MEDICARE

## 2020-01-22 VITALS
RESPIRATION RATE: 16 BRPM | HEART RATE: 54 BPM | WEIGHT: 229 LBS | OXYGEN SATURATION: 96 % | DIASTOLIC BLOOD PRESSURE: 70 MMHG | BODY MASS INDEX: 32.06 KG/M2 | HEIGHT: 71 IN | SYSTOLIC BLOOD PRESSURE: 132 MMHG

## 2020-01-22 PROCEDURE — 99204 OFFICE O/P NEW MOD 45 MIN: CPT | Performed by: UROLOGY

## 2020-01-22 SDOH — HEALTH STABILITY: MENTAL HEALTH: HOW MANY STANDARD DRINKS CONTAINING ALCOHOL DO YOU HAVE ON A TYPICAL DAY?: 1 OR 2

## 2020-03-02 ENCOUNTER — TELEPHONE (OUTPATIENT)
Dept: FAMILY MEDICINE CLINIC | Age: 85
End: 2020-03-02

## 2020-03-04 ENCOUNTER — OFFICE VISIT (OUTPATIENT)
Dept: UROLOGY | Age: 85
End: 2020-03-04
Payer: MEDICARE

## 2020-03-04 VITALS
HEIGHT: 72 IN | HEART RATE: 69 BPM | DIASTOLIC BLOOD PRESSURE: 78 MMHG | BODY MASS INDEX: 30.64 KG/M2 | OXYGEN SATURATION: 99 % | SYSTOLIC BLOOD PRESSURE: 112 MMHG | WEIGHT: 226.2 LBS

## 2020-03-04 PROCEDURE — 99213 OFFICE O/P EST LOW 20 MIN: CPT | Performed by: UROLOGY

## 2020-03-04 NOTE — PROGRESS NOTES
Dr. Daniel Osman MD  [facility] Urology Clinic Consultation / New Patient Visit    Patient:  Erna Sanches  YOB: 1935  Date: 3/4/2020  Consult requested from Phylicia Barnett MD     HISTORY OF PRESENT ILLNESS:   The patient is a 81 y.o. male who presents today for follow-up for the following problem(s): right hydrocele  Overall the problem(s) : are worsening. Associated Symptoms: No dysuria, gross hematuria. Pain Severity:      Today visit:   3/4/20  Hydrocele is improving, smaller size  - possibly reactive after shoveling snow  New onset frequency and urgency  - frequency x 4  - urgency: mild  Pt is not bothered by this   No pain  No hematuria    1/22/20  Started Saturday after shoveling snow  No pain, no trauma  TUS (reviewed) - no hernia  - complex right hydrocele, no masses, no torsion  Patient not bothered by this       Summary of old records:   (Patient's old records, notes and chart reviewed and summarized above.)    Last several PSA's:  No results found for: PSA    Last total testosterone:  No results found for: TESTOSTERONE    Urinalysis today:  No results found for this visit on 03/04/20.       Last BUN and creatinine:  Lab Results   Component Value Date    BUN 20 08/21/2019     Lab Results   Component Value Date    CREATININE 0.9 08/21/2019       Imaging Reviewed during this Office Visit:   (results were independently reviewed by physician and radiology report verified)    PAST MEDICAL, FAMILY AND SOCIAL HISTORY:  Past Medical History:   Diagnosis Date    C2 cervical fracture ([address]     Cancer ([address]     skin    Enlarged prostate     Hyperlipidemia     Thrombocytopenia ([address]     borderline episodic     Past Surgical History:   Procedure Laterality Date    CERVICAL SP[facility] SURGERY  [facility]/2016    [facility] C2 Fracture    [facility]    HIP SURGERY Right     ORIF after fall on the ice    HIP SURGERY Right 01/2015    UPPER GASTROINTESTINAL ENDOSCOPY 06/03/2019     Family History   Problem Relation Age of Onset    Stroke Father     Coronary Art Dis Father     Stroke Sister      Outpatient Medications Marked as Taking for the 3/4/20 encounter (Office Visit) with Ileana Carbajal MD   Medication Sig Dispense Refill    Handicap Placard MISC by Does not apply route The disability is expected to last lifetime  Dx: chronic right hip pain 1 each 0    pantoprazole (PROTONIX) 20 MG tablet take 1 tablet by mouth every morning before breakfast 90 tablet 3    finasteride (PROSCAR) 5 MG tablet TAKE 1 TABLET DAILY 90 tablet 4    atorvastatin (LIPITOR) 20 MG tablet TAKE 1 TABLET DAILY AT BEDTIME 90 tablet 3    diclofenac sodium 1 % GEL Apply 2 g topically every 6 hours as needed  0    Amoxicillin 500 MG TABS Prior to dental procedures      ranitidine (ZANTAC) 150 MG tablet Take 1 tablet by mouth 2 times daily 60 tablet 3    oxybutynin (DITROPAN-XL) 10 MG extended release tablet TAKE 1 TABLET DAILY 90 tablet 3    ketoconazole (NIZORAL) 2 % shampoo Apply topically daily as needed. 120 mL 5    polyethylene glycol (GLYCOLAX) powder Take 17 g by mouth daily      melatonin 5 MG TABS tablet Take 10 mg by mouth daily       docusate sodium (COLACE) 100 MG capsule Take 100 mg by mouth daily         Patient has no known allergies.   Social History     Tobacco Use   Smoking Status Never Smoker   Smokeless Tobacco Never Used       Social History     Substance and Sexual Activity   Alcohol Use Yes    Frequency: Monthly or less    Drinks per session: 1 or 2    Binge frequency: Never       REVIEW OF SYSTEMS:  Constitutional: negative  Eyes: negative  Respiratory: negative  Cardiovascular: negative  Gastrointestinal: negative  Musculoskeletal: negative  Genitourinary: negative  Skin: negative   Neurological: negative  Hematological/Lymphatic: negative  Psychological: negative    Physical Exam:    This a 81 y.o. male   Vitals:    03/04/20 0758   BP: 112/78   Pulse: 69 SpO2: 99%     Constitutional: Patient in no acute distress   Neuro: alert and oriented to person place and time. Psych: Mood and affect normal.  Head: atraumatic normocephalic  Eyes: EOMi  HEENT: neck supple, trachea midline  Lungs: Respiratory effort normal  Cardiovascular:  Normal peripheral pulses  Abdomen: Soft, non-tender, non-distended, No CVA  Bladder: non-tender and not distended. FROMx4, no cyanosis clubbing edema  Skin: warm and dry      Assessment and Plan      1. Scrotal swelling    2. Encysted hydrocele    3. Frequency of micturition    4. Urgency incontinence           Plan:      No follow-ups on file. Scrotal support  Avoid heavy lifting  FU 6 months to discuss LUTs and physical exam  Patient instructed to avoid bladder irritants in diet such as coffee, tea, caffeine, alcohol, carbonated beverages, spicy/acidic foods. Patient given a list of these to avoid.

## 2020-03-05 ENCOUNTER — OFFICE VISIT (OUTPATIENT)
Dept: FAMILY MEDICINE CLINIC | Age: 85
End: 2020-03-05
Payer: MEDICARE

## 2020-03-05 ENCOUNTER — HOSPITAL ENCOUNTER (OUTPATIENT)
Age: 85
Setting detail: SPECIMEN
Discharge: HOME | End: 2020-03-05
Payer: MEDICARE

## 2020-03-05 VITALS
DIASTOLIC BLOOD PRESSURE: 80 MMHG | BODY MASS INDEX: 30.88 KG/M2 | WEIGHT: 228 LBS | SYSTOLIC BLOOD PRESSURE: 154 MMHG | HEART RATE: 57 BPM | HEIGHT: 72 IN | OXYGEN SATURATION: 97 %

## 2020-03-05 LAB
ABSOLUTE EOS #: 0.16 K/UL (ref 0–0.44)
ABSOLUTE IMMATURE GRANULOCYTE: <0.03 K/UL (ref 0–0.3)
ABSOLUTE LYMPH #: 0.84 K/UL (ref 1.1–3.7)
ABSOLUTE MONO #: 0.6 K/UL (ref 0.1–1.2)
BASOPHILS # BLD: 1 % (ref 0–2)
BASOPHILS ABSOLUTE: <0.03 K/UL (ref 0–0.2)
DIFFERENTIAL TYPE: ABNORMAL
EOSINOPHILS RELATIVE PERCENT: 4 % (ref 1–4)
HCT VFR BLD CALC: 40.7 % (ref 40.7–50.3)
HEMOGLOBIN: 13 G/DL (ref 13–17)
IMMATURE GRANULOCYTES: 0 %
LYMPHOCYTES # BLD: 20 % (ref 24–43)
MCH RBC QN AUTO: 30.7 PG (ref 25.2–33.5)
MCHC RBC AUTO-ENTMCNC: 31.9 G/DL (ref 25.2–33.5)
MCV RBC AUTO: 96 FL (ref 82.6–102.9)
MONOCYTES # BLD: 14 % (ref 3–12)
NRBC AUTOMATED: 0 PER 100 WBC
PDW BLD-RTO: 12.7 % (ref 11.8–14.4)
PLATELET # BLD: 123 K/UL (ref 138–453)
PLATELET ESTIMATE: ABNORMAL
PMV BLD AUTO: 9.8 FL (ref 8.1–13.5)
RBC # BLD: 4.24 M/UL (ref 4.21–5.77)
RBC # BLD: ABNORMAL 10*6/UL
SEG NEUTROPHILS: 62 % (ref 36–65)
SEGMENTED NEUTROPHILS ABSOLUTE COUNT: 2.62 K/UL (ref 1.5–8.1)
WBC # BLD: 4.3 K/UL (ref 3.5–11.3)
WBC # BLD: ABNORMAL 10*3/UL

## 2020-03-05 PROCEDURE — 36415 COLL VENOUS BLD VENIPUNCTURE: CPT

## 2020-03-05 PROCEDURE — 85025 COMPLETE CBC W/AUTO DIFF WBC: CPT

## 2020-03-05 PROCEDURE — G0439 PPPS, SUBSEQ VISIT: HCPCS | Performed by: FAMILY MEDICINE

## 2020-03-05 RX ORDER — MOMETASONE FUROATE 1 MG/G
OINTMENT TOPICAL
Qty: 15 G | Refills: 0 | Status: SHIPPED | OUTPATIENT
Start: 2020-03-05

## 2020-03-05 RX ORDER — OXYBUTYNIN CHLORIDE 10 MG/1
TABLET, EXTENDED RELEASE ORAL
Qty: 90 TABLET | Refills: 3 | Status: SHIPPED | OUTPATIENT
Start: 2020-03-05 | End: 2021-03-04 | Stop reason: SDUPTHER

## 2020-03-05 ASSESSMENT — LIFESTYLE VARIABLES
HOW MANY STANDARD DRINKS CONTAINING ALCOHOL DO YOU HAVE ON A TYPICAL DAY: 0
HOW OFTEN DO YOU HAVE SIX OR MORE DRINKS ON ONE OCCASION: 0
HOW OFTEN DO YOU HAVE A DRINK CONTAINING ALCOHOL: 2
AUDIT-C TOTAL SCORE: 2

## 2020-03-05 ASSESSMENT — PATIENT HEALTH QUESTIONNAIRE - PHQ9
SUM OF ALL RESPONSES TO PHQ QUESTIONS 1-9: 0
SUM OF ALL RESPONSES TO PHQ QUESTIONS 1-9: 0

## 2020-03-05 NOTE — PROGRESS NOTES
Medicare Annual Wellness Visit  Name: Sangita Morrison Date: 3/8/2020   MRN: [id number] Sex: Male   Age: 81 y.o. Ethnicity: Non-/Non    : 1935 Race: Paula Mei is here for Medicare AWV    Screenings for behavioral, psychosocial and functional/safety risks, and cognitive dysfunction are all negative except as indicated below. These results, as well as other patient data from the [address] form, are documented in Flowsheets linked to this Encounter. Did see urology this week and they recommended watching the hydrocele that he developed. He was seen initially by urology and then also surgery. He was not shown to have a hernia but a hydrocele on the ultrasound. He was recommended to wear his athletic supporter more consistently and is been trying to do that. He does not have pain just some discomfort. No recent change. He has not had a black tarry stools. He has been taking his pantoprazole and his ranitidine regularly. He has not had any abdominal pain. He has not had any recent blood work. Is a history of thrombocytopenia which is been intermittent. He denies any symptoms with his BPH. He has been taking his finasteride regularly does not have to get up frequently at night maybe once or twice at the most usually depending on how much he drank. No trouble starting or stopping his stream.        No Known Allergies      Prior to Visit Medications    Medication Sig Taking? Authorizing Provider   oxybutynin (DITROPAN-XL) 10 MG extended release tablet TAKE 1 TABLET DAILY Yes Linn Martinez MD   mometasone (ELOCON) 0.1 % ointment Apply topically daily.  Yes Linn Martinez MD   pantoprazole (PROTONIX) 20 MG tablet take 1 tablet by mouth every morning before breakfast Yes Linn Martinez MD   finasteride (PROSCAR) 5 MG tablet TAKE 1 TABLET DAILY Yes Linn Martinez MD   atorvastatin (LIPITOR) 20 MG tablet TAKE 1 TABLET DAILY AT BEDTIME Yes Ulices Pena MD Edwar   diclofenac sodium 1 % GEL Apply 2 g topically every 6 hours as needed Yes Historical Provider, MD   ranitidine (ZANTAC) 150 MG tablet Take 1 tablet by mouth 2 times daily Yes Lionel Herrera MD   ketoconazole (NIZORAL) 2 % shampoo Apply topically daily as needed.  Yes Lionel Herrera MD   polyethylene glycol [facility]) powder Take 17 g by mouth daily Yes Historical Provider, MD   melatonin 5 MG TABS tablet Take 10 mg by mouth daily  Yes Historical Provider, MD   docusate sodium (COLACE) 100 MG capsule Take 100 mg by mouth daily Yes Historical Provider, MD   Handicap Placard MISC by Does not apply route The disability is expected to last lifetime  Dx: chronic right hip pain  Lionel Herrera MD   Amoxicillin 500 MG TABS Prior to dental procedures  Historical Provider, MD         Past Medical History:   Diagnosis Date    [facility]     Cancer ([facility]     skin    Enlarged prostate     Hyperlipidemia     Thrombocytopenia ([facility]     borderline episodic       Past Surgical History:   Procedure Laterality Date    CERVICAL SP[facility]  [facility] C2 Fracture    COLONOSCOPY  09/2016    [facility]    HIP SURGERY Right     ORIF after fall on the ice    HIP SURGERY Right 01/2015    UPPER GASTROINTESTINAL ENDOSCOPY  06/03/2019         Family History   Problem Relation Age of Onset    Stroke Father     Coronary Art Dis Father     Stroke Sister        CareTeam (Including outside providers/suppliers regularly involved in providing care):   Patient Care Team:  Lionel Herrera MD as PCP - General (Family Medicine)  Lionel Herrera MD as PCP - [facility] Empaneled Provider  Bibi Chan (Orthopedic Surgery)  Graham Sandoval DO (Dermatology)    Wt Readings from Last 3 Encounters:   03/05/20 228 lb (103.4 kg)   03/04/20 226 lb 3.2 oz (102.6 kg)   01/22/20 229 lb (103.9 kg)     Vitals:    03/05/20 0711 03/05/20 0722   BP: (!) 156/80 (!) 154/80   Site: Left Upper Arm Left Upper Arm Tetanus-Diphth-Acell Pertussis (BOOSTRIX) 5-2.5-18.5 LF-MCG/0.5 injection    CBC Auto Differential   4. Iron deficiency anemia due to chronic blood loss     5. Need for prophylactic vaccination against diphtheria-tetanus-pertussis (DTP)       Continue with his current medications. We will continue on the acid reducers also at this time. We will check his labs to follow-up on the iron deficiency anemia after his bleeding also this spring. We will also check this to follow-up on his history of thrombocytopenia. Refer to get his Tdap done as well. Positive Risk Factor Screenings with Interventions:     Cognitive: Words recalled: 2 Words Recalled  Total Score Interpretation: Positive Mini-Cog  Did the patient refuse to take the cognition test?: No  Cognitive Impairment Interventions:  · MMSE score 27/30    Health Habits/Nutrition:  Health Habits/Nutrition  Do you exercise for at least 20 minutes 2-3 times per week?: Yes  Have you lost any weight without trying in the past 3 months?: No  Do you eat fewer than 2 meals per day?: No  Have you seen a dentist within the past year?: Yes  Body mass index is 30.92 kg/m².   Health Habits/Nutrition Interventions:  · reviewed healthy diet and exercise    Hearing/Vision:  No exam data present  Hearing/Vision  Do you or your family notice any trouble with your hearing?: (!) Yes  Do you have difficulty driving, watching TV, or doing any of your daily activities because of your eyesight?: No  Have you had an eye exam within the past year?: Yes  Hearing/Vision Interventions:  · Hearing concerns:  patient declines any further evaluation/treatment for hearing issues    Personalized Preventive Plan   Current Health Maintenance Status  Immunization History   Administered Date(s) Administered    DTaP (Infanrix) 12/02/2009    Hepatitis A Adult (Havrix, Vaqta) 05/01/2001, 10/20/2001, 11/20/2001    Hepatitis B (Recombivax HB) 04/30/1997, 05/30/1997, 09/24/1997    Hepatitis B Adult (Engerix-B) 04/30/1997, 05/30/1997, 09/24/1997    Influenza A (F8P2-74) Vaccine PF IM 12/31/2009    Influenza Virus Vaccine 09/29/2013, 11/09/2016    Influenza Whole 10/12/2007, 09/28/2009, 10/15/2010, 10/04/2011, 09/25/2012    Influenza, High Dose (Fluzone 65 yrs and older) 10/09/2014, 11/09/2016, 09/25/2017, 09/06/2018, 08/29/2019    Meningococcal B, OMV (Bexsero) 11/16/2011    Meningococcal MPSV4 (Menomune) 11/16/2011    Pneumococcal Conjugate 13-valent (Ploeoua68) 08/17/2015    Pneumococcal Polysaccharide (Xesjaalmv81) 07/07/2003, 02/25/2010    Polio IPV (IPOL) 05/04/2001    Typhoid Vac, Parenteral, Other Than Acetone-killed, Dried 04/07/2011    Typhoid Vi capsular polysaccharide (Typhim VI) 04/07/2011    Yellow Fever (YF-Vax) 11/01/2011    Zoster Live (Zostavax) 04/18/2012    Zoster Recombinant (Shingrix) 03/29/2018, 06/16/2018        Health Maintenance   Topic Date Due    Annual Wellness Visit (AWV)  05/29/2019    DTaP/Tdap/Td vaccine (2 - Tdap) 12/02/2019    Lipid screen  08/21/2020    Potassium monitoring  08/21/2020    Creatinine monitoring  08/21/2020    Flu vaccine  Completed    Shingles Vaccine  Completed    Pneumococcal 65+ years Vaccine  Completed    Hepatitis A vaccine  Aged Out    Hepatitis B vaccine  Aged Out    Hib vaccine  Aged Out    Meningococcal (ACWY) vaccine  Aged Out     Recommendations for [employer] Due: see orders and patient instructions/AVS.  . Recommended screening schedule for the next 5-10 years is provided to the patient in written form: see Patient Instructions/AVS.    Ravi LANG LPN, 2/8/2689, performed the documented evaluation under the direct supervision of the attending physician.

## 2020-03-26 ENCOUNTER — OFFICE VISIT (OUTPATIENT)
Dept: FAMILY MEDICINE CLINIC | Age: 85
End: 2020-03-26
Payer: MEDICARE

## 2020-03-26 VITALS
DIASTOLIC BLOOD PRESSURE: 74 MMHG | WEIGHT: 224 LBS | BODY MASS INDEX: 30.37 KG/M2 | HEART RATE: 61 BPM | SYSTOLIC BLOOD PRESSURE: 134 MMHG | OXYGEN SATURATION: 95 %

## 2020-03-26 PROCEDURE — 99214 OFFICE O/P EST MOD 30 MIN: CPT | Performed by: FAMILY MEDICINE

## 2020-03-26 PROCEDURE — 99213 OFFICE O/P EST LOW 20 MIN: CPT | Performed by: FAMILY MEDICINE

## 2020-03-26 ASSESSMENT — PATIENT HEALTH QUESTIONNAIRE - PHQ9
SUM OF ALL RESPONSES TO PHQ9 QUESTIONS 1 & 2: 0
1. LITTLE INTEREST OR PLEASURE IN DOING THINGS: 0
SUM OF ALL RESPONSES TO PHQ QUESTIONS 1-9: 0
SUM OF ALL RESPONSES TO PHQ QUESTIONS 1-9: 0
2. FEELING DOWN, DEPRESSED OR HOPELESS: 0

## 2020-03-26 NOTE — PROGRESS NOTES
[address] [facility]  [address]  Dept: [phone]  Dept QTZ:[phone]    Eze Burton is a 81 y.o. male who presents today for his medical conditions/complaints as notedbelow. Eze Burton is c/o of ED Follow-up ([facility] ER 3/15/2020, states bowels are back to normal- he has been drinking more water, eating slower and chewing food completely before swallowing, and adjusting diet)      HPI:     HPI   Started on the Sunday morning and all day he really dd not feel well. No nausea or vomiting. The discomfort increased throughout the day. He tried to go but was not really able to. Paced because his belly hurt so bad. Did try the pepto at home and that did not really help. Did an enema in the ER and that gave him relief. Had a significant bowel movement at that time. Has been taking alittle while to get completely back to normal but he feels like he is now. Has been increasing his fiber. Noticed that over the last several months prior to this he had been eating more meat much less fiber had cut out his carbs basically and was just overall eating less fruits and vegetables. Also had been a little bit less active and was maybe not drinking as much water as he had been previously. Has now been drinking a lot more water also. He denies dysuria urgency frequency denies hematuria. CT scan done in the emergency room did show significant stool load. Also showed a 2 cm left renal mass and large renal cysts bilaterally. The cysts were 7 cm on the right 5 cm on the left. These were simple looking cyst.  The mass on the left kidney had not been visualized on previous scans per radiology. An ultrasound was recommended to further evaluate. 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     Will wake up in the morning with a headache then will get up and it is gone, will have it once dailly and it is gone almost always right there above his left eye. Lasts at the most a few minutes. Has had these headaches for a while. Quite a while but always for his dimensions him. They are not getting more intense or not getting more frequent just remembered to actually ask about them at this time. BP Readings from Last 3 Encounters:   03/26/20 134/74   03/05/20 (!) 154/80   03/04/20 112/78          (goal 120/80)    Wt Readings from Last 3 Encounters:   03/26/20 224 lb (101.6 kg)   03/05/20 228 lb (103.4 kg)   03/04/20 226 lb 3.2 oz (102.6 kg)        Past Medical History:   Diagnosis Date    C2 cervical fracture (HCC)     Cancer ([address]     skin    Enlarged prostate     Hyperlipidemia     Thrombocytopenia (HCC)     borderline episodic      Past Surgical History:   Procedure Laterality Date    CERVICAL SP[facility] SURG[facility]  [facility]/2016    [facility] C2 Fracture    COLONOSCOPY  09/2016    [facility]    HIP SURGERY Right     ORIF after fall on the ice    HIP SURGERY Right 01/2015    UPPER GASTROINTESTINAL ENDOSCOPY  06/03/2019       Family History   Problem Relation Age of Onset    Stroke Father     Coronary Art Dis Father     Stroke Sister        Social History     Tobacco Use    Smoking status: Never Smoker    Smokeless tobacco: Never Used   Substance Use Topics    Alcohol use: Yes     Frequency: Monthly or less     Drinks per session: 1 or 2     Binge frequency: Never      Current Outpatient Medications   Medication Sig Dispense Refill    oxybutynin (DITROPAN-XL) 10 MG extended release tablet TAKE 1 TABLET DAILY 90 tablet 3    mometasone (ELOCON) 0.1 % ointment Apply topically daily.  15 g 0    pantoprazole (PROTONIX) 20 MG tablet take 1 tablet by mouth every morning before breakfast 90 tablet 3    finasteride (PROSCAR) 5 MG tablet TAKE 1 TABLET DAILY 90 tablet 4    atorvastatin (LIPITOR) 20 MG tablet TAKE 1 TABLET DAILY AT BEDTIME 90 tablet 3    diclofenac sodium 1 % GEL Apply 2 g topically every 6 hours as minutes every day and we would address further at that time. Will proceed with the ultrasound to further evaluate the left renal mass today. Will refer to urology for further evaluation at that time. Lab Results   Component Value Date    WBC 10.7 03/15/2020    HGB 13.7 (L) 03/15/2020    HCT 42.3 03/15/2020    .4 03/15/2020    CHOL 153 08/21/2019    TRIG 60 08/21/2019    HDL 54 08/05/2016    ALT 17 (L) 03/15/2020    AST 31 03/15/2020     03/15/2020    K 4.7 03/15/2020     03/15/2020    CREATININE 0.8 03/15/2020    BUN 24 (H) 03/15/2020    CO2 29 03/15/2020       Return for As scheduled. Patient given educational materials - see patientinstructions. Discussed use, benefit, and side effects of prescribed medications. All patient questions answered. Pt voiced understanding. Reviewed health maintenance. Instructed to continue current medications, diet andexercise. Patient agreed with treatment plan. Follow up as directed.      Electronically signed by Eladio Valentino MD on 3/27/2020

## 2020-03-30 ENCOUNTER — TELEPHONE (OUTPATIENT)
Dept: FAMILY MEDICINE CLINIC | Age: 85
End: 2020-03-30

## 2020-03-31 ENCOUNTER — VIRTUAL VISIT (OUTPATIENT)
Dept: UROLOGY | Age: 85
End: 2020-03-31
Payer: MEDICARE

## 2020-03-31 PROCEDURE — 99213 OFFICE O/P EST LOW 20 MIN: CPT | Performed by: UROLOGY

## 2020-03-31 NOTE — PROGRESS NOTES
Pursuant to the emergency declaration under the [facility], [facility] waiver authority and the [employer] Act, this Virtual  Visit was conducted, with patient's consent, to reduce the patient's risk of exposure to COVID-19 and provide continuity of care for an established patient. Services were provided through a video synchronous discussion virtually to substitute for in-person clinic visit.
SYSTEMS:  Constitutional: negative  Eyes: negative  Respiratory: negative  Cardiovascular: negative  Gastrointestinal: negative  Musculoskeletal: negative  Genitourinary: negative  Skin: negative   Neurological: negative  Hematological/Lymphatic: negative  Psychological: negative    Physical Exam:    This a 81 y.o. male   There were no vitals filed for this visit. Constitutional: Patient in no acute distress   Neuro: alert and oriented to person place and time. Psych: Mood and affect normal.  Head: atraumatic normocephalic  Eyes: EOMi  HEENT: neck supple, trachea midline  Lungs: Respiratory effort normal  FROMx4  Skin:  dry      Assessment and Plan      1. Renal cyst    2. Frequency of micturition    3. Urgency incontinence    4. Scrotal swelling    5. Encysted hydrocele           Plan:      No follow-ups on file. Frequency and urgency improved  Hydrocele unchanged    Renal cyst - new problem  Discussed imaging  Renal cyst seen on CT scan and GAEL.   Possible \"solid components\" on GAEL  Discussed observation, and possible reimaging in future  FU 6 months with GAEL and to discuss LUTs and physical exam

## 2020-04-10 ENCOUNTER — HOSPITAL ENCOUNTER (OUTPATIENT)
Age: 85
Setting detail: SPECIMEN
Discharge: HOME | End: 2020-04-10
Payer: MEDICARE

## 2020-04-10 ENCOUNTER — OFFICE VISIT (OUTPATIENT)
Dept: FAMILY MEDICINE CLINIC | Age: 85
End: 2020-04-10
Payer: MEDICARE

## 2020-04-10 VITALS
DIASTOLIC BLOOD PRESSURE: 78 MMHG | BODY MASS INDEX: 30.37 KG/M2 | WEIGHT: 224 LBS | SYSTOLIC BLOOD PRESSURE: 138 MMHG | HEART RATE: 67 BPM | OXYGEN SATURATION: 95 %

## 2020-04-10 LAB
C-REACTIVE PROTEIN: 1.3 MG/L (ref 0–5)
SEDIMENTATION RATE, ERYTHROCYTE: 10 MM (ref 0–20)

## 2020-04-10 PROCEDURE — 36415 COLL VENOUS BLD VENIPUNCTURE: CPT

## 2020-04-10 PROCEDURE — 86140 C-REACTIVE PROTEIN: CPT

## 2020-04-10 PROCEDURE — 99213 OFFICE O/P EST LOW 20 MIN: CPT | Performed by: FAMILY MEDICINE

## 2020-04-10 PROCEDURE — 99212 OFFICE O/P EST SF 10 MIN: CPT

## 2020-04-10 PROCEDURE — 85651 RBC SED RATE NONAUTOMATED: CPT

## 2020-04-10 RX ORDER — FEXOFENADINE HCL 180 MG/1
180 TABLET ORAL DAILY
COMMUNITY
End: 2022-11-01

## 2020-04-10 ASSESSMENT — PATIENT HEALTH QUESTIONNAIRE - PHQ9
2. FEELING DOWN, DEPRESSED OR HOPELESS: 0
SUM OF ALL RESPONSES TO PHQ QUESTIONS 1-9: 0
SUM OF ALL RESPONSES TO PHQ9 QUESTIONS 1 & 2: 0
SUM OF ALL RESPONSES TO PHQ QUESTIONS 1-9: 0
1. LITTLE INTEREST OR PLEASURE IN DOING THINGS: 0

## 2020-04-12 ASSESSMENT — ENCOUNTER SYMPTOMS
DIARRHEA: 0
CONSTIPATION: 0
VOMITING: 0
NAUSEA: 0

## 2020-04-15 ENCOUNTER — TELEPHONE (OUTPATIENT)
Dept: UROLOGY | Age: 85
End: 2020-04-15

## 2020-04-15 NOTE — TELEPHONE ENCOUNTER
Patient was seen on 3/31/2020 for a right hydrocele. Patient states within the last week he has had pain in that area. Please call patient back at [phone].

## 2020-04-29 ENCOUNTER — OFFICE VISIT (OUTPATIENT)
Dept: FAMILY MEDICINE CLINIC | Age: 85
End: 2020-04-29
Payer: MEDICARE

## 2020-04-29 VITALS
DIASTOLIC BLOOD PRESSURE: 78 MMHG | WEIGHT: 219 LBS | SYSTOLIC BLOOD PRESSURE: 138 MMHG | OXYGEN SATURATION: 98 % | HEART RATE: 63 BPM | BODY MASS INDEX: 29.7 KG/M2

## 2020-04-29 PROCEDURE — 99213 OFFICE O/P EST LOW 20 MIN: CPT | Performed by: FAMILY MEDICINE

## 2020-04-29 PROCEDURE — 99211 OFF/OP EST MAY X REQ PHY/QHP: CPT

## 2020-04-29 NOTE — PROGRESS NOTES
Exam  Vitals signs and nursing note reviewed. Constitutional:       General: He is not in acute distress. Appearance: Normal appearance. He is obese. He is not ill-appearing. HENT:      Nose: Nose normal.      Mouth/Throat:      Mouth: Mucous membranes are moist.   Neck:      Comments: Range of motion is is somewhat limited due to kyphosis. Cardiovascular:      Rate and Rhythm: Normal rate and regular rhythm. Pulses: Normal pulses. Heart sounds: Normal heart sounds. Pulmonary:      Effort: Pulmonary effort is normal.      Breath sounds: Normal breath sounds. Abdominal:      General: Bowel sounds are normal. There is no distension. Palpations: Abdomen is soft. There is no mass. Tenderness: There is no abdominal tenderness. Hernia: No hernia is present. Comments: There appears to be tenderness in the ligaments and the anterior hip at the groin. There is no pain with any of the hip movements there is a little bit limited hip external rotation due to his previous surgeries but is not in any way painful. There is to me know appearance of appearance of any change in the hydrocele. Musculoskeletal:      Right lower leg: No edema. Left lower leg: No edema. Lymphadenopathy:      Cervical: No cervical adenopathy. Neurological:      Mental Status: He is alert and oriented to person, place, and time. Gait: Gait abnormal.   Psychiatric:         Mood and Affect: Mood normal.         Behavior: Behavior normal.         Thought Content: Thought content normal.         Judgment: Judgment normal.         Assessment/Plan:      Diagnosis Orders   1. Right inguinal pain       Suspect William's pain is related to tendons and the abnormal gait as well as some component of irritation from the weight of the hydrocele and the irritation from the jockstrap. Discussed options including wearing compression shorts to try to take the pressure off of the area from the jockstrap.   If it

## 2020-05-22 ENCOUNTER — OFFICE VISIT (OUTPATIENT)
Dept: FAMILY MEDICINE CLINIC | Age: 85
End: 2020-05-22
Payer: MEDICARE

## 2020-05-22 VITALS
SYSTOLIC BLOOD PRESSURE: 122 MMHG | WEIGHT: 216 LBS | HEART RATE: 57 BPM | DIASTOLIC BLOOD PRESSURE: 78 MMHG | OXYGEN SATURATION: 96 % | BODY MASS INDEX: 29.29 KG/M2

## 2020-05-22 PROCEDURE — 99213 OFFICE O/P EST LOW 20 MIN: CPT | Performed by: FAMILY MEDICINE

## 2020-05-22 PROCEDURE — 99212 OFFICE O/P EST SF 10 MIN: CPT

## 2020-05-22 NOTE — PROGRESS NOTES
[address] [place] [address] [address]  Dept: [phone]  Dept MXS:[phone]    Agapito Saucedo is a 81 y.o. male who presents today for his medical conditions/complaints as notedbelow. Agapito Saucedo is c/o of Flank Pain (right side pain for the past week)      HPI:     HPI  Has been having increasing pain in his right flank. Increases with laying down. No N/V. No fever or chills. No Changes in his bowels. Can only lay on his back. Cannot lay on the sides or it really hurts. Did try the tylenol and that helped. Has been working in the yard. Did also [place] but the pain has been there before that. Was working in [employer] and found he could not stretch his right leg out when he was riding around in this.      BP Readings from Last 3 Encounters:   05/22/20 122/78   04/29/20 138/78   04/10/20 138/78          (goal 120/80)    Wt Readings from Last 3 Encounters:   05/22/20 216 lb (98 kg)   04/29/20 219 lb (99.3 kg)   04/10/20 224 lb (101.6 kg)        Past Medical History:   Diagnosis Date    C2 cervical fracture (HCC)     Cancer ([address]     skin    Enlarged prostate     Hyperlipidemia     Thrombocytopenia (HCC)     borderline episodic      Past Surgical History:   Procedure Laterality Date    [facility] C2 Fracture    [facility]    HIP SURGERY Right     ORIF after fall on the ice    HIP SURGERY Right 01/2015    UPPER GASTROINTESTINAL ENDOSCOPY  06/03/2019       Family History   Problem Relation Age of Onset    Stroke Father     Coronary Art Dis Father     Stroke Sister        Social History     Tobacco Use    Smoking status: Never Smoker    Smokeless tobacco: Never Used   Substance Use Topics    Alcohol use: Yes     Frequency: Monthly or less     Drinks per session: 1 or 2     Binge frequency: Never      Current Outpatient Medications

## 2020-07-06 ENCOUNTER — HOSPITAL ENCOUNTER (OUTPATIENT)
Age: 85
Setting detail: SPECIMEN
Discharge: HOME | End: 2020-07-06
Payer: MEDICARE

## 2020-07-06 ENCOUNTER — OFFICE VISIT (OUTPATIENT)
Dept: FAMILY MEDICINE CLINIC | Age: 85
End: 2020-07-06
Payer: MEDICARE

## 2020-07-06 VITALS
HEART RATE: 58 BPM | DIASTOLIC BLOOD PRESSURE: 64 MMHG | OXYGEN SATURATION: 94 % | BODY MASS INDEX: 29.29 KG/M2 | WEIGHT: 216 LBS | SYSTOLIC BLOOD PRESSURE: 136 MMHG

## 2020-07-06 LAB
CREAT SERPL-MCNC: 0.76 MG/DL (ref 0.7–1.2)
GFR AFRICAN AMERICAN: >60 ML/MIN
GFR NON-AFRICAN AMERICAN: >60 ML/MIN
GFR SERPL CREATININE-BSD FRML MDRD: NORMAL ML/MIN/{1.73_M2}
GFR SERPL CREATININE-BSD FRML MDRD: NORMAL ML/MIN/{1.73_M2}

## 2020-07-06 PROCEDURE — 99213 OFFICE O/P EST LOW 20 MIN: CPT

## 2020-07-06 PROCEDURE — 36415 COLL VENOUS BLD VENIPUNCTURE: CPT

## 2020-07-06 PROCEDURE — 82565 ASSAY OF CREATININE: CPT

## 2020-07-06 PROCEDURE — 99214 OFFICE O/P EST MOD 30 MIN: CPT | Performed by: FAMILY MEDICINE

## 2020-07-06 RX ORDER — FINASTERIDE 5 MG/1
TABLET, FILM COATED ORAL
Qty: 90 TABLET | Refills: 4 | Status: SHIPPED | OUTPATIENT
Start: 2020-07-06 | End: 2020-09-27

## 2020-07-06 RX ORDER — ATORVASTATIN CALCIUM 20 MG/1
TABLET, FILM COATED ORAL
Qty: 90 TABLET | Refills: 3 | Status: SHIPPED | OUTPATIENT
Start: 2020-07-06 | End: 2021-09-27

## 2020-07-06 RX ORDER — PANTOPRAZOLE SODIUM 20 MG/1
20 TABLET, DELAYED RELEASE ORAL DAILY
Qty: 90 TABLET | Refills: 3 | Status: SHIPPED | OUTPATIENT
Start: 2020-07-06 | End: 2021-07-06

## 2020-07-06 ASSESSMENT — ENCOUNTER SYMPTOMS
EYE PAIN: 0
BLURRED VISION: 0
EYE REDNESS: 0
VISUAL CHANGE: 1
EYE WATERING: 0
VOMITING: 0
PHOTOPHOBIA: 0
NAUSEA: 0

## 2020-07-06 NOTE — PROGRESS NOTES
daily. 15 g 0    diclofenac sodium 1 % GEL Apply 2 g topically every 6 hours as needed  0    ketoconazole (NIZORAL) 2 % shampoo Apply topically daily as needed. 120 mL 5    polyethylene glycol (GLYCOLAX) powder Take 17 g by mouth daily      melatonin 5 MG TABS tablet Take 10 mg by mouth daily       docusate sodium (COLACE) 100 MG capsule Take 100 mg by mouth daily      Handicap Placard MISC by Does not apply route The disability is expected to last lifetime  Dx: chronic right hip pain 1 each 0    Amoxicillin 500 MG TABS Prior to dental procedures       No current facility-administered medications for this visit. No Known Allergies    Health Maintenance   Topic Date Due    Lipid screen  08/21/2020    Flu vaccine (1) 09/01/2020    Annual Wellness Visit (AWV)  03/06/2021    Potassium monitoring  03/15/2021    Creatinine monitoring  07/06/2021    DTaP/Tdap/Td vaccine (3 - Td) 03/06/2030    Shingles Vaccine  Completed    Pneumococcal 65+ years Vaccine  Completed    Hepatitis A vaccine  Aged Out    Hepatitis B vaccine  Aged Out    Hib vaccine  Aged Out    Meningococcal (ACWY) vaccine  Aged Out       Subjective:      Review of Systems   HENT: Negative for ear pain and tinnitus. Eyes: Negative for blurred vision (no scitomas), photophobia, pain and redness. Gastrointestinal: Negative for nausea and vomiting. Musculoskeletal: Positive for neck pain. Neurological: Positive for headaches and loss of balance. Negative for dizziness and numbness. Objective:     /64 (Site: Left Upper Arm, Position: Sitting, Cuff Size: Large Adult)   Pulse 58   Wt 216 lb (98 kg)   SpO2 94%   BMI 29.29 kg/m²     Physical Exam  Vitals signs and nursing note reviewed. Constitutional:       Appearance: Normal appearance. He is not ill-appearing. HENT:      Head: Normocephalic.       Comments: No scalp tenderness noted, no sinus tenderness noted     Right Ear: Tympanic membrane, ear canal and external the orthopedic exceptions   Psychiatric:         Mood and Affect: Mood normal.         Behavior: Behavior normal.         Thought Content: Thought content normal.         Judgment: Judgment normal.         Assessment/Plan:      Diagnosis Orders   1. Transient diplopia  MRI BRAIN W WO CONTRAST    External Referral To Ophthalmology   2. Gastrointestinal hemorrhage associated with acute gastritis  pantoprazole (PROTONIX) 20 MG tablet   3. Benign prostatic hyperplasia, unspecified whether lower urinary tract symptoms present  finasteride (PROSCAR) 5 MG tablet   4. New onset headache  MRI BRAIN W WO CONTRAST    External Referral To Ophthalmology   5. Pain of left eye  MRI BRAIN W WO CONTRAST    External Referral To Ophthalmology   6. Essential hypertension  Creatinine, Serum   7. Thrombocytopenia ([facility]       With Jack's history of GI bleeding I would avoid all NSAIDs for treatment of his headaches. Continue with the Tylenol as needed basis. Has a known history of previous cervical lecture and his headaches could potentially be related to the cervical arthritis but I am less convinced. Should have a complete ophthalmologic examination with the abnormalities on his physical exam for his eye more concerning with the transient episodes of double vision would be an underlying intracranial pathology. I am unable to reproduce the diplopia on exam today and extraocular muscle testing is normal in the office today. May have some cataracts contributing but needs to have full evaluation to rule out other pathology. He did have a normal sed rate and CRP back in April when he for started complaining about these headaches to me and with no scalp tenderness feel that temporal arteritis is much less likely.     Lab Results   Component Value Date    WBC 10.7 03/15/2020    HGB 13.7 (L) 03/15/2020    HCT 42.3 03/15/2020    .4 03/15/2020    CHOL 153 08/21/2019    TRIG 60 08/21/2019    HDL 54 08/05/2016    ALT 17 (L) 03/15/2020

## 2020-07-21 LAB
C-REACTIVE PROTEIN: < 0.5 MG/DL (ref 0–1)
HCT VFR BLD CALC: 36.3 % (ref 42–52)
HEMOGLOBIN: 12.5 (ref 13.8–17.8)
MCH RBC QN AUTO: 32.8 PG (ref 28.5–32.5)
MCHC RBC AUTO-ENTMCNC: 34.6 G/DL (ref 32–37)
MCV RBC AUTO: 94.8 FL (ref 80–94)
PDW BLD-RTO: 12.1 % (ref 10–15.5)
PLATELET # BLD: 110.2 THOU/MM3 (ref 130–400)
RBC: 3.83 M/UL (ref 4.7–6.1)
SEDIMENTATION RATE, ERYTHROCYTE: 17 MM/HR (ref 0–20)
WBC: 4.1 THOU/ML3 (ref 4.8–10.8)

## 2020-08-20 ENCOUNTER — OFFICE VISIT (OUTPATIENT)
Dept: FAMILY MEDICINE CLINIC | Age: 85
End: 2020-08-20
Payer: MEDICARE

## 2020-08-20 VITALS
HEART RATE: 64 BPM | SYSTOLIC BLOOD PRESSURE: 126 MMHG | DIASTOLIC BLOOD PRESSURE: 72 MMHG | BODY MASS INDEX: 28.47 KG/M2 | WEIGHT: 210 LBS | OXYGEN SATURATION: 96 %

## 2020-08-20 PROCEDURE — 99214 OFFICE O/P EST MOD 30 MIN: CPT | Performed by: FAMILY MEDICINE

## 2020-08-20 PROCEDURE — 99212 OFFICE O/P EST SF 10 MIN: CPT | Performed by: FAMILY MEDICINE

## 2020-08-20 RX ORDER — DOCUSATE SODIUM 100 MG/1
100 CAPSULE, LIQUID FILLED ORAL 2 TIMES DAILY
Qty: 60 CAPSULE | Refills: 0
Start: 2020-08-20

## 2020-08-20 ASSESSMENT — ENCOUNTER SYMPTOMS
VOMITING: 0
ABDOMINAL PAIN: 1
CONSTIPATION: 1
NAUSEA: 0

## 2020-08-20 NOTE — PROGRESS NOTES
1200 [facility]  [address]  Dept: [phone]  Dept DA:[phone]    Cici Vital is a 81 y.o. male who presents today for his medical conditions/complaints as notedbelow. Cici Vital is c/o of Abdominal Pain (Pain for the past 3-4 weeks. Yesterday pain was severe. No pain today. )      HPI:     Will sometimes have it and then will have a BM and it will feel better. Yesterday was so bad that he had to stop working in the yard. Pain will be better for awhile. Mostly in the left upper quadrant. Now today has not pain. Aching pain. Not stabbing. All in the upper quadrant on the left side. No nausea. Pain in the last 3-4 weeks has not really changed a lot. Abdominal Pain   This is a recurrent problem. The current episode started 1 to 4 weeks ago (will come and go. Sometimes will wakeup with it). The problem occurs intermittently. The most recent episode lasted 2 hours. The pain is located in the LLQ and epigastric region. The pain is at a severity of 8/10. The pain is moderate. The quality of the pain is aching. Associated symptoms include constipation. Pertinent negatives include no anorexia, fever, headaches, nausea or vomiting. Annette Billy has had a lot going on the last year. He did have a visit to the emergency room in March and a CT abdomen and pelvis was done at that time. Diagnosis at that time was excessive constipation. He felt much better after his bowels moved and he was cleaned out. He does have the known large right hydrocele. He also has a known history of renal cysts including a large 5 cm cyst on the left kidney with a 2 cm cystic component. He has been following with Dr. Buck Banks and has repeat renal ultrasounds scheduled for next month and a follow-up appointment with him in person after that. Has lost about 6 lbs in the last 6 months. Is doing a lot of mowing, cutting trees, digging lately.      BP Readings from Last 3 Encounters:   08/20/20 126/72   07/06/20 136/64   05/22/20 122/78          (goal 120/80)    Wt Readings from Last 3 Encounters:   08/20/20 210 lb (95.3 kg)   07/06/20 216 lb (98 kg)   05/22/20 216 lb (98 kg)        Past Medical History:   Diagnosis Date    [facility]     Cancer ([facility]     skin    Enlarged prostate     Hyperlipidemia     Thrombocytopenia (HCC)     borderline episodic      Past Surgical History:   Procedure Laterality Date    [facility] C2 Fracture    [facility]  09/2016    [facility]    HIP SURGERY Right     ORIF after fall on the ice    HIP SURGERY Right 01/2015    UPPER GASTROINTESTINAL ENDOSCOPY  06/03/2019       Family History   Problem Relation Age of Onset    Stroke Father     Coronary Art Dis Father     Stroke Sister        Social History     Tobacco Use    Smoking status: Never Smoker    Smokeless tobacco: Never Used   Substance Use Topics    Alcohol use: Yes     Frequency: Monthly or less     Drinks per session: 1 or 2     Binge frequency: Never      Current Outpatient Medications   Medication Sig Dispense Refill    docusate sodium (COLACE) 100 MG capsule Take 1 capsule by mouth 2 times daily 60 capsule 0    pantoprazole (PROTONIX) 20 MG tablet Take 1 tablet by mouth daily 90 tablet 3    finasteride (PROSCAR) 5 MG tablet One daily 90 tablet 4    atorvastatin (LIPITOR) 20 MG tablet TAKE 1 TABLET DAILY AT BEDTIME 90 tablet 3    fexofenadine (ALLEGRA) 180 MG tablet Take 180 mg by mouth daily      oxybutynin (DITROPAN-XL) 10 MG extended release tablet TAKE 1 TABLET DAILY 90 tablet 3    mometasone (ELOCON) 0.1 % ointment Apply topically daily. 15 g 0    diclofenac sodium 1 % GEL Apply 2 g topically every 6 hours as needed  0    ketoconazole (NIZORAL) 2 % shampoo Apply topically daily as needed.  120 mL 5    polyethylene glycol (GLYCOLAX) powder Take 17 g by mouth daily      melatonin 5 MG TABS tablet Take 10 mg by mouth daily       Handicap Placard MISC by Does not apply route The disability is expected to last lifetime  Dx: chronic right hip pain 1 each 0    Amoxicillin 500 MG TABS Prior to dental procedures       No current facility-administered medications for this visit. No Known Allergies    Health Maintenance   Topic Date Due    Lipid screen  08/21/2020    Flu vaccine (1) 09/01/2020    Annual Wellness Visit (AWV)  03/06/2021    Potassium monitoring  03/15/2021    Creatinine monitoring  07/06/2021    DTaP/Tdap/Td vaccine (3 - Td) 03/06/2030    Shingles Vaccine  Completed    Pneumococcal 65+ years Vaccine  Completed    Hepatitis A vaccine  Aged Out    Hepatitis B vaccine  Aged Out    Hib vaccine  Aged Out    Meningococcal (ACWY) vaccine  Aged Out       Subjective:      Review of Systems   Constitutional: Negative for fever. Gastrointestinal: Positive for abdominal pain and constipation. Negative for anorexia, nausea and vomiting. Neurological: Negative for headaches. Objective:     /72 (Site: Left Upper Arm, Position: Sitting, Cuff Size: Large Adult)   Pulse 64   Wt 210 lb (95.3 kg)   SpO2 96%   BMI 28.47 kg/m²     Physical Exam  Vitals signs and nursing note reviewed. Constitutional:       Appearance: Normal appearance. He is well-developed. HENT:      Head: Normocephalic and atraumatic. Eyes:      Conjunctiva/sclera: Conjunctivae normal.   Neck:      Musculoskeletal: Normal range of motion and neck supple. Thyroid: No thyromegaly. Vascular: No JVD. Cardiovascular:      Rate and Rhythm: Normal rate and regular rhythm. Heart sounds: Normal heart sounds. No murmur. No friction rub. No gallop. Pulmonary:      Effort: Pulmonary effort is normal. No respiratory distress. Breath sounds: Normal breath sounds. Abdominal:      General: There is no distension. Palpations: There is no mass. Tenderness:  There is abdominal tenderness (LUQ is tender to palp). Musculoskeletal:      Right lower leg: No edema. Left lower leg: No edema. Comments: Kyphoscoliosis is present at this time   Lymphadenopathy:      Cervical: No cervical adenopathy. Skin:     General: Skin is warm. Capillary Refill: Capillary refill takes less than 2 seconds. Neurological:      Mental Status: He is alert and oriented to person, place, and time. Psychiatric:         Mood and Affect: Mood normal.         Behavior: Behavior normal.         Thought Content: Thought content normal.         Judgment: Judgment normal.         Assessment/Plan:      Diagnosis Orders   1. Left upper quadrant pain     2. Left renal mass     3. Essential hypertension     4. Thrombocytopenia ([address]       With the known left renal mass basically a cyst with some solid component was to have a recheck ultrasound in September but will go ahead and order this now to further assess. Pain may be coming from that cystic mass. He does have a follow-up scheduled in October with urology but if it does appear that this is grown in light of the pain would recommend follow-up sooner. If the ultrasound shows stability may still want a reach out to urology and see if they feel that this could be part of his pain. We will go ahead and have him bump up his Colace to twice daily to hopefully help with keeping the stool soft as to not aggravate this area. Increase the colace (docusate) to twice daily. We will check the Ultrasound of the kidney on the left side to make sure this has not grown and started to cause you the pain. Also you have the follow up appointment in October with Dr. Beatrice Parham.      Lab Results   Component Value Date    WBC 4.1 (L) 07/21/2020    HGB 12.5 (L) 07/21/2020    HCT 36.3 (L) 07/21/2020    .2 (L) 07/21/2020    CHOL 153 08/21/2019    TRIG 60 08/21/2019    HDL 54 08/05/2016    ALT 17 (L) 03/15/2020    AST 31 03/15/2020     03/15/2020    K 4.7 03/15/2020     03/15/2020    CREATININE 0.76 07/06/2020    BUN 24 (H) 03/15/2020    CO2 29 03/15/2020       Return for As scheduled. Patient given educational materials - see patientinstructions. Discussed use, benefit, and side effects of prescribed medications. All patient questions answered. Pt voiced understanding. Reviewed health maintenance. Instructed to continue current medications, diet andexercise. Patient agreed with treatment plan. Follow up as directed.      (Please note that portions of this note were completed with a voice-recognition program. Efforts were made to edit the dictation but occasionally words are mis-transcribed.)    Electronically signed by Hermila Hooper MD on 8/23/2020

## 2020-08-20 NOTE — PATIENT INSTRUCTIONS
Increase the colace (docusate) to twice daily. We will check the Ultrasound of the kidney on the left side to make sure this has not grown and started to cause you the pain. Also you have the follow up appointment in October with Dr. Joselo Betts.

## 2020-08-27 ENCOUNTER — TELEPHONE (OUTPATIENT)
Dept: FAMILY MEDICINE CLINIC | Age: 85
End: 2020-08-27

## 2020-08-27 NOTE — TELEPHONE ENCOUNTER
Spoke with Dr. Doty [facility] and he recommended an MRI to evaluate the kidney. If his pain is really bothering him then will move up the appt with Gunner sooner than Oct.     Since he has been increasing the colace he has felt better. The bowels are moving better and he thinks he is feeling better since he is moving the bowels. Did hurt more when he had the USN but not today any more. Patient notified.  Also advised to avoid activities that bother him

## 2020-08-27 NOTE — TELEPHONE ENCOUNTER
Had ultrasound of his kidneys done yesterday. He has seen Dr. Rosina Hutton for these about 6 months ago. He has been having intermittent and increasing left-sided abdominal pain. Cyst shows increase in size on the ultrasound compared to 6 months ago. I am concerned that his pain is coming from this and also concerned of possible neoplasm versus hemorrhagic cyst.  I would like to talk to Dr. Rosina Hutton to see what our next steps are. He does have an appointment but is not for at least several more weeks and I think you may need to be seen sooner. Please see if I can get hold of Dr. Rosina Hutton today.

## 2020-09-03 ENCOUNTER — HOSPITAL ENCOUNTER (OUTPATIENT)
Age: 85
Setting detail: SPECIMEN
Discharge: HOME | End: 2020-09-03
Payer: MEDICARE

## 2020-09-03 LAB
BUN BLDV-MCNC: 23 MG/DL (ref 8–23)
CREAT SERPL-MCNC: 0.82 MG/DL (ref 0.7–1.2)
GFR AFRICAN AMERICAN: >60 ML/MIN
GFR NON-AFRICAN AMERICAN: >60 ML/MIN
GFR SERPL CREATININE-BSD FRML MDRD: NORMAL ML/MIN/{1.73_M2}
GFR SERPL CREATININE-BSD FRML MDRD: NORMAL ML/MIN/{1.73_M2}

## 2020-09-03 PROCEDURE — 84520 ASSAY OF UREA NITROGEN: CPT

## 2020-09-03 PROCEDURE — 36415 COLL VENOUS BLD VENIPUNCTURE: CPT

## 2020-09-03 PROCEDURE — 82565 ASSAY OF CREATININE: CPT

## 2020-09-11 ENCOUNTER — OFFICE VISIT (OUTPATIENT)
Dept: FAMILY MEDICINE CLINIC | Age: 85
End: 2020-09-11
Payer: MEDICARE

## 2020-09-11 VITALS
SYSTOLIC BLOOD PRESSURE: 134 MMHG | HEART RATE: 52 BPM | BODY MASS INDEX: 28.91 KG/M2 | OXYGEN SATURATION: 91 % | DIASTOLIC BLOOD PRESSURE: 86 MMHG | WEIGHT: 213.2 LBS

## 2020-09-11 LAB
CHOLESTEROL/HDL RATIO: 2.57 RATIO (ref 0–4.5)
CHOLESTEROL: 144 MG/DL (ref 50–200)
GLUCOSE: 95 MG/DL (ref 75–110)
HDLC SERPL-MCNC: 56 MG/DL (ref 36–68)
LDL CHOLESTEROL CALCULATED: 77 MG/DL (ref 0–160)
TRIGL SERPL-MCNC: 55 MG/DL (ref 10–250)
VLDLC SERPL CALC-MCNC: 11 MG/DL (ref 0–50)

## 2020-09-11 PROCEDURE — 99213 OFFICE O/P EST LOW 20 MIN: CPT | Performed by: FAMILY MEDICINE

## 2020-09-11 PROCEDURE — 90662 IIV NO PRSV INCREASED AG IM: CPT | Performed by: FAMILY MEDICINE

## 2020-09-11 RX ORDER — AMOXICILLIN 500 MG/1
TABLET, FILM COATED ORAL
COMMUNITY
End: 2020-11-12 | Stop reason: ALTCHOICE

## 2020-09-11 ASSESSMENT — ENCOUNTER SYMPTOMS
COUGH: 0
SHORTNESS OF BREATH: 0
WHEEZING: 0
ABDOMINAL PAIN: 0

## 2020-09-11 NOTE — PROGRESS NOTES
Review of Systems   Constitutional: Negative for fatigue and fever. Would like to discuss mri results. Respiratory: Negative for cough, shortness of breath and wheezing. Cardiovascular: Negative for chest pain, palpitations and leg swelling. Gastrointestinal: Negative for abdominal pain. Genitourinary: Negative for frequency and urgency. Neurological: Positive for headaches (has had ongoing headaches, states they are nothing new. ). Negative for dizziness.

## 2020-09-11 NOTE — PROGRESS NOTES
[address] [address] [address]  Dept: [phone]  Dept ZOI:[phone]    Bree Patel is a 81 y.o. male who presents today for his medical conditions/complaints as notedbelow. Bree Patel is c/o of 6 Month Follow-Up and Results (MRI)      HPI:     HPI    Since his last visit check has not had any significant abdominal pain. He has had some minor discomfort in the left upper quadrant mostly when he is been very active but it resolves very quickly when he rests for a few minutes. His bowels have been moving regularly with the Colace twice daily. He has not had any diarrhea. He has not had nausea or vomiting. His appetite is been good. He did have the MRI. MRI showed redocumentation of     IMPRESSION:      1.  Numerous bilateral simple appearing renal cysts.      2.  A 2 cm probable complicated cyst of the left kidney.        3.  Small splenic cyst     Essentially the complicated cyst is very stable. He does have numerous varying size other cysts but these also are very stable.     BP Readings from Last 3 Encounters:   09/11/20 134/86   08/20/20 126/72   07/06/20 136/64          (goal 120/80)    Wt Readings from Last 3 Encounters:   09/11/20 213 lb 3.2 oz (96.7 kg)   08/20/20 210 lb (95.3 kg)   07/06/20 216 lb (98 kg)        Past Medical History:   Diagnosis Date    C2 cervical fracture (HCC)     Cancer ([facility] [address]     skin    Enlarged prostate     Hyperlipidemia     Thrombocytopenia (HCC)     borderline episodic      Past Surgical History:   Procedure Laterality Date    CERV[facility] SP[facility]  [facility]29/2016    [facility] C2 Fracture    COL[facility]OS[facility]  09/2016    [facility]    HIP SURGERY Right     ORIF after fall on the ice    HIP SURGERY Right 01/2015    UPPER GASTROINTESTINAL ENDOSCOPY  06/03/2019       Family History   Problem Relation Age of Onset    Stroke Father     Coronary Art Dis Father     Stroke Sister        Social History Tobacco Use    Smoking status: Never Smoker    Smokeless tobacco: Never Used   Substance Use Topics    Alcohol use: Yes     Frequency: Monthly or less     Drinks per session: 1 or 2     Binge frequency: Never      Current Outpatient Medications   Medication Sig Dispense Refill    docusate sodium (COLACE) 100 MG capsule Take 1 capsule by mouth 2 times daily 60 capsule 0    pantoprazole (PROTONIX) 20 MG tablet Take 1 tablet by mouth daily 90 tablet 3    finasteride (PROSCAR) 5 MG tablet One daily 90 tablet 4    atorvastatin (LIPITOR) 20 MG tablet TAKE 1 TABLET DAILY AT BEDTIME 90 tablet 3    fexofenadine (ALLEGRA) 180 MG tablet Take 180 mg by mouth daily      oxybutynin (DITROPAN-XL) 10 MG extended release tablet TAKE 1 TABLET DAILY 90 tablet 3    mometasone (ELOCON) 0.1 % ointment Apply topically daily. 15 g 0    Handicap Placard MISC by Does not apply route The disability is expected to last lifetime  Dx: chronic right hip pain 1 each 0    diclofenac sodium 1 % GEL Apply 2 g topically every 6 hours as needed  0    ketoconazole (NIZORAL) 2 % shampoo Apply topically daily as needed. 120 mL 5    polyethylene glycol (GLYCOLAX) powder Take 17 g by mouth daily      melatonin 5 MG TABS tablet Take 10 mg by mouth daily       Amoxicillin 500 MG TABS Take by mouth       No current facility-administered medications for this visit. No Known Allergies    Health Maintenance   Topic Date Due    Annual Wellness Visit (AWV)  03/06/2021    Potassium monitoring  03/15/2021    Creatinine monitoring  09/03/2021    Lipid screen  09/11/2021    DTaP/Tdap/Td vaccine (3 - Td) 03/06/2030    Flu vaccine  Completed    Shingles Vaccine  Completed    Pneumococcal 65+ years Vaccine  Completed    Hepatitis A vaccine  Aged Out    Hepatitis B vaccine  Aged Out    Hib vaccine  Aged Out    Meningococcal (ACWY) vaccine  Aged Out       Subjective:      Review of Systems  Constitutional: Negative for fatigue and fever. Would like to discuss mri results. Respiratory: Negative for cough, shortness of breath and wheezing. Cardiovascular: Negative for chest pain, palpitations and leg swelling. Gastrointestinal: Negative for abdominal pain. Genitourinary: Negative for frequency and urgency. Neurological: Positive for headaches (has had ongoing headaches, states they are nothing new. ). Negative for dizziness. Objective:     /86   Pulse 52   Wt 213 lb 3.2 oz (96.7 kg)   SpO2 91%   BMI 28.91 kg/m²     Physical Exam  Vitals signs and nursing note reviewed. Constitutional:       Appearance: Normal appearance. HENT:      Head: Normocephalic. Neck:      Musculoskeletal: Normal range of motion and neck supple. No neck rigidity or muscular tenderness. Cardiovascular:      Rate and Rhythm: Normal rate and regular rhythm. Pulses: Normal pulses. Heart sounds: Normal heart sounds. Pulmonary:      Effort: Pulmonary effort is normal.      Breath sounds: Normal breath sounds. Abdominal:      General: Abdomen is flat. There is no distension. Palpations: There is no mass (Left upper quadrant is mildly full but cannot see any discrete masses that I can palpate). Tenderness: There is no abdominal tenderness. Skin:     Capillary Refill: Capillary refill takes less than 2 seconds. Neurological:      General: No focal deficit present. Mental Status: He is alert and oriented to person, place, and time. Psychiatric:         Mood and Affect: Mood normal.         Behavior: Behavior normal.         Thought Content: Thought content normal.         Judgment: Judgment normal.         Assessment/Plan:      Diagnosis Orders   1. Left renal mass     2. Need for influenza vaccination  INFLUENZA, HIGH-DOSE, QUADV, 72 YRS +, IM, PF, 0.7ML (FLUZONE)   3. Essential hypertension     4. Screening for diabetes mellitus  Glucose, Fasting   5. Screening, lipid  Lipid Panel   6.  Left upper quadrant pain 7. Bilateral renal cysts       Will go ahead and monitor the renal mass. Has had a referral appointment coming up with urology and will ask for their input at that time. Suspect that his pain definitely could be related to these cysts on the kidney this larger cyst.  This is primarily a problem when there is pressure from the bowels not moving and when he is very physically active aggravating them. Recommended he keep the bowels moving and avoid the excessive physical activity that really is problematic. As long as he is not having significant growth in these areas and he is able to control his pain by activity modification then may not need to do any further interventions at this time. Lab Results   Component Value Date    WBC 4.1 (L) 07/21/2020    HGB 12.5 (L) 07/21/2020    HCT 36.3 (L) 07/21/2020    .2 (L) 07/21/2020    CHOL 144 09/11/2020    TRIG 55 09/11/2020    HDL 56 09/11/2020    ALT 17 (L) 03/15/2020    AST 31 03/15/2020     03/15/2020    K 4.7 03/15/2020     03/15/2020    CREATININE 0.82 09/03/2020    BUN 23 09/03/2020    CO2 29 03/15/2020       Return if symptoms worsen or fail to improve, for As scheduled. Patient given educational materials - see patientinstructions. Discussed use, benefit, and side effects of prescribed medications. All patient questions answered. Pt voiced understanding. Reviewed health maintenance. Instructed to continue current medications, diet andexercise. Patient agreed with treatment plan. Follow up as directed.      (Please note that portions of this note were completed with a voice-recognition program. Efforts were made to edit the dictation but occasionally words are mis-transcribed.)    Electronically signed by Gay Blevins MD on 9/19/2020

## 2020-09-19 PROBLEM — N28.89 LEFT RENAL MASS: Status: ACTIVE | Noted: 2020-09-19

## 2020-09-19 PROBLEM — N28.1 BILATERAL RENAL CYSTS: Status: ACTIVE | Noted: 2020-09-19

## 2020-09-27 RX ORDER — FINASTERIDE 5 MG/1
TABLET, FILM COATED ORAL
Qty: 90 TABLET | Refills: 3 | Status: SHIPPED | OUTPATIENT
Start: 2020-09-27 | End: 2021-07-21

## 2020-10-07 ENCOUNTER — OFFICE VISIT (OUTPATIENT)
Dept: UROLOGY | Age: 85
End: 2020-10-07
Payer: MEDICARE

## 2020-10-07 VITALS
SYSTOLIC BLOOD PRESSURE: 118 MMHG | WEIGHT: 213 LBS | HEART RATE: 57 BPM | RESPIRATION RATE: 16 BRPM | OXYGEN SATURATION: 87 % | DIASTOLIC BLOOD PRESSURE: 84 MMHG | HEIGHT: 72 IN | BODY MASS INDEX: 28.85 KG/M2

## 2020-10-07 PROCEDURE — 99214 OFFICE O/P EST MOD 30 MIN: CPT | Performed by: UROLOGY

## 2020-10-07 NOTE — PROGRESS NOTES
Dr. Kelly Rubin MD  [facility] Consultation / New Patient Visit    Patient:  Peggy Walters  YOB: 1935  Date: 10/7/2020  Consult requested from Ariel Fairchild MD     HISTORY OF PRESENT ILLNESS:   The patient is a 81 y.o. male who presents today for follow-up for the following problem(s): right hydrocele  Overall the problem(s) : are worsening. Associated Symptoms: No dysuria, gross hematuria. Pain Severity:      Today visit:   10/7/20  Martina Jaimes follows with us for a right sided hydrocele, and a renal cyst.  On cyst is being followed for possible complex components. The He states the hydrocele is bothersome to him, and he states it hurts when he is wearing a seatbelt. This actually appears to be a hernia of the right inguinal canal.      3/31/20 - this visit was performed with a video teleconference. Doing well   Had a CT scan - multiple renal cysts and a hyperdense cyst  GAEL - demonstrated a renal cyst with solid component, consistent with hyperdense cysto demonstrated on CT scan  Pt asymptomatic  Denies hematuria    3/4/20  Hydrocele is improving, smaller size  - possibly reactive after shoveling snow  New onset frequency and urgency  - frequency x 4  - urgency: mild  Pt is not bothered by this   No pain  No hematuria    1/22/20  Started Saturday after shoveling snow  No pain, no trauma  TUS (reviewed) - no hernia  - complex right hydrocele, no masses, no torsion  Patient not bothered by this       Summary of old records:   (Patient's old records, notes and chart reviewed and summarized above.)    Last several PSA's:  No results found for: PSA    Last total testosterone:  No results found for: TESTOSTERONE    Urinalysis today:  No results found for this visit on 10/07/20.       Last BUN and creatinine:  Lab Results   Component Value Date    BUN 23 09/03/2020     Lab Results   Component Value Date    CREATININE 0.82 09/03/2020       Imaging Reviewed during this Office Visit:   (results were independently reviewed by physician and radiology report verified)    PAST MEDICAL, FAMILY AND SOCIAL HISTORY:  Past Medical History:   Diagnosis Date    [facility] cervical fracture ([facility] [address]     Cancer ([facility] [address]     skin    Enlarged prostate     Hyperlipidemia     Thrombocytopenia ([facility] [address]     borderline episodic     Past Surgical History:   Procedure Laterality Date    [facility] C2 Fracture    [facility]    HIP SURGERY Right     ORIF after fall on the ice    HIP SURGERY Right 01/2015    UPPER GASTROINTESTINAL ENDOSCOPY  06/03/2019     Family History   Problem Relation Age of Onset    Stroke Father     Coronary Art Dis Father     Stroke Sister      Outpatient Medications Marked as Taking for the 10/7/20 encounter (Office Visit) with Valentin Suarez MD   Medication Sig Dispense Refill    finasteride (PROSCAR) 5 MG tablet TAKE 1 TABLET DAILY 90 tablet 3    docusate sodium (COLACE) 100 MG capsule Take 1 capsule by mouth 2 times daily 60 capsule 0    pantoprazole (PROTONIX) 20 MG tablet Take 1 tablet by mouth daily 90 tablet 3    atorvastatin (LIPITOR) 20 MG tablet TAKE 1 TABLET DAILY AT BEDTIME 90 tablet 3    fexofenadine (ALLEGRA) 180 MG tablet Take 180 mg by mouth daily      oxybutynin (DITROPAN-XL) 10 MG extended release tablet TAKE 1 TABLET DAILY 90 tablet 3    mometasone (ELOCON) 0.1 % ointment Apply topically daily. 15 g 0    diclofenac sodium 1 % GEL Apply 2 g topically every 6 hours as needed  0    ketoconazole (NIZORAL) 2 % shampoo Apply topically daily as needed. 120 mL 5    polyethylene glycol (GLYCOLAX) powder Take 17 g by mouth daily      melatonin 5 MG TABS tablet Take 10 mg by mouth daily          Patient has no known allergies.   Social History     Tobacco Use   Smoking Status Never Smoker   Smokeless Tobacco Never Used       Social History     Substance and Sexual Activity   Alcohol Use Yes    Frequency: Monthly or less    Drinks per session: 1 or 2    Binge frequency: Never       REVIEW OF SYSTEMS:  Constitutional: negative  Eyes: negative  Respiratory: negative  Cardiovascular: negative  Gastrointestinal: negative  Musculoskeletal: negative  Genitourinary: negative  Skin: negative   Neurological: negative  Hematological/Lymphatic: negative  Psychological: negative    Physical Exam:    This a 81 y.o. male   Vitals:    10/07/20 0800   BP: 118/84   Pulse: 57   Resp: 16   SpO2: (!) 87%     Constitutional: Patient in no acute distress   Neuro: alert and oriented to person place and time. Psych: Mood and affect normal.  Head: atraumatic normocephalic  Eyes: EOMi  HEENT: neck supple, trachea midline  Lungs: Respiratory effort normal  FROMx4  Skin:  dry      Assessment and Plan      1. Renal cyst    2. Frequency of micturition    3. Urgency incontinence    4. Scrotal swelling    5. Encysted hydrocele    6. Hernia, inguinal, right           Plan:      No follow-ups on file. Frequency and urgency improved  Hydrocele unchanged  New right inguinal hernia    Renal cyst - new problem  Discussed imaging  Renal cyst seen on MRI CT scan and GAEL. Possible \"possible complex features\", small and stable over the last year. Discussed observation, and possible reimaging in future. He would like to observe, and if appears to grow will order biopsy and consider cryoablation of malignant.       Scrotal US for right inguinal hernia  Referral to gen surgery

## 2020-10-13 ENCOUNTER — OFFICE VISIT (OUTPATIENT)
Dept: SURGERY | Age: 85
End: 2020-10-13
Payer: MEDICARE

## 2020-10-13 VITALS
TEMPERATURE: 97.2 F | HEIGHT: 72 IN | SYSTOLIC BLOOD PRESSURE: 139 MMHG | DIASTOLIC BLOOD PRESSURE: 78 MMHG | WEIGHT: 212.3 LBS | HEART RATE: 62 BPM | BODY MASS INDEX: 28.76 KG/M2

## 2020-10-13 PROCEDURE — 99214 OFFICE O/P EST MOD 30 MIN: CPT | Performed by: SURGERY

## 2020-10-13 NOTE — PROGRESS NOTES
Subjective   Inga Kwok is a 81 y.o. male who presents today for valuation of possible right inguinal hernia. Patient is known to me and was seen in January of this year for swelling in the scrotum. At that time he was noted to have hydrocele as well as varicocele and was sent to urology for further evaluation. He has been following with them since that time and was recently seen back for follow-up evaluation. Does seem that over the past couple months he has begun to notice some swelling in the right groin separate from the testicular/scrotal swelling. After evaluation by urology it was felt that he may have a right inguinal hernia and he is referred back to general surgery. On discussion today he states that he does seem to notice from time to time he will have swelling in the inguinal region when he is up doing activities especially working in the yard. He does not report any significant pain but some mild discomfort when he does notice the swelling. He reports when he lays down at night that this seems to go away by the next morning. Denies any changes in bowel movements. No nausea or emesis. No skin changes in the area.     Past Medical History:   Diagnosis Date    [facility] ([facility] [address]     Cancer ([facility] [address]     skin    Enlarged prostate     Hyperlipidemia     Thrombocytopenia (HCC)     borderline episodic       Past Surgical History:   Procedure Laterality Date    [facility] C2 Fracture    [facility]    HIP SURGERY Right     ORIF after fall on the ice    HIP SURGERY Right 01/2015    UPPER GASTROINTESTINAL ENDOSCOPY  06/03/2019       Current Outpatient Medications   Medication Sig Dispense Refill    finasteride (PROSCAR) 5 MG tablet TAKE 1 TABLET DAILY 90 tablet 3    Amoxicillin 500 MG TABS Take by mouth      docusate sodium (COLACE) 100 MG capsule Take 1 capsule by mouth 2 times daily 60 capsule 0    pantoprazole (PROTONIX) 20 MG tablet Take 1 tablet by mouth daily 90 tablet 3    atorvastatin (LIPITOR) 20 MG tablet TAKE 1 TABLET DAILY AT BEDTIME 90 tablet 3    fexofenadine (ALLEGRA) 180 MG tablet Take 180 mg by mouth daily      oxybutynin (DITROPAN-XL) 10 MG extended release tablet TAKE 1 TABLET DAILY 90 tablet 3    mometasone (ELOCON) 0.1 % ointment Apply topically daily. 15 g 0    diclofenac sodium 1 % GEL Apply 2 g topically every 6 hours as needed  0    ketoconazole (NIZORAL) 2 % shampoo Apply topically daily as needed. 120 mL 5    polyethylene glycol (GLYCOLAX) powder Take 17 g by mouth daily      melatonin 5 MG TABS tablet Take 10 mg by mouth daily        No current facility-administered medications for this visit.         No Known Allergies    Family History   Problem Relation Age of Onset    Stroke Father     Coronary Art Dis Father     Stroke Sister        Social History     Socioeconomic History    Marital status:      Spouse name: Not on file    Number of children: Not on file    Years of education: Not on file    Highest education level: Not on file   Occupational History    Not on file   Social Needs    Financial resource strain: Not on file    Food insecurity     Worry: Not on file     Inability: Not on file   [employer] needs     Medical: Not on file     Non-medical: Not on file   Tobacco Use    Smoking status: Never Smoker    Smokeless tobacco: Never Used   Substance and Sexual Activity    Alcohol use: Yes     Frequency: Monthly or less     Drinks per session: 1 or 2     Binge frequency: Never    Drug use: Never    Sexual activity: Yes     Partners: Female   Lifestyle    Physical activity     Days per week: Not on file     Minutes per session: Not on file    Stress: Not on file   Relationships    Social connections     Talks on phone: Not on file     Gets together: Not on file     Attends [employer] service: Not on file     Active member of club or organization: Not on file     Attends meetings of clubs or organizations: Not on file     Relationship status: Not on file    Intimate partner violence     Fear of current or ex partner: Not on file     Emotionally abused: Not on file     Physically abused: Not on file     Forced sexual activity: Not on file   Other Topics Concern    Not on file   Social History Narrative    Not on file       ROS:   Review of Systems - General ROS: negative  Psychological ROS: negative  Ophthalmic ROS: negative  ENT ROS: negative  Respiratory ROS: no cough, shortness of breath, or wheezing  Cardiovascular ROS: no chest pain or dyspnea on exertion  Gastrointestinal ROS: Per HPI  Genito-Urinary ROS: no dysuria, trouble voiding, or hematuria  Musculoskeletal ROS: negative      Objective   Vitals:    10/13/20 1114   BP: 139/78   Pulse: 62   Temp: 97.2 °F (36.2 °C)     General:in no apparent distress and well developed and well nourished  Eyes: No gross abnormalities. Ears, Nose, Throat: hearing grossly normal bilaterally  Neck: neck supple and non tender without mass  Lungs: clear to auscultation without wheezes or rales   Heart: S1S2, no mumurs, RRR  Abdomen: Soft, nondistended, nontender to palpation, bowel sounds present. In the right inguinal area there is a swelling along the inguinal canal that is reducible during exam.  Is reproducible with Valsalva. This is separate from swelling in the right scrotum and scrotal swelling is present even after reduction of the inguinal bulge. Extremity: negative  Neuro: CN II-XII grossly intact      Assessment     3  59-year-old male with reducible inguinal hernia  2. Known history of hydrocele and varicocele on the right side      Plan     1. After discussion with Gallo Sweet today we did discuss that appears on exam that he now does have right inguinal hernia in addition to his known hydrocele and varicocele. He did have questions about whether he needs to proceed with repair immediately.   I did discuss with him that inguinal

## 2020-11-12 ENCOUNTER — OFFICE VISIT (OUTPATIENT)
Dept: FAMILY MEDICINE CLINIC | Age: 85
End: 2020-11-12
Payer: MEDICARE

## 2020-11-12 VITALS
BODY MASS INDEX: 29.29 KG/M2 | DIASTOLIC BLOOD PRESSURE: 80 MMHG | OXYGEN SATURATION: 97 % | SYSTOLIC BLOOD PRESSURE: 130 MMHG | WEIGHT: 216 LBS | HEART RATE: 57 BPM

## 2020-11-12 PROCEDURE — 99213 OFFICE O/P EST LOW 20 MIN: CPT | Performed by: FAMILY MEDICINE

## 2020-11-12 PROCEDURE — 99214 OFFICE O/P EST MOD 30 MIN: CPT | Performed by: FAMILY MEDICINE

## 2020-11-12 RX ORDER — KETOCONAZOLE 20 MG/G
CREAM TOPICAL
Qty: 30 G | Refills: 1 | Status: SHIPPED | OUTPATIENT
Start: 2020-11-12

## 2020-11-12 ASSESSMENT — PATIENT HEALTH QUESTIONNAIRE - PHQ9
2. FEELING DOWN, DEPRESSED OR HOPELESS: 0
SUM OF ALL RESPONSES TO PHQ9 QUESTIONS 1 & 2: 0
SUM OF ALL RESPONSES TO PHQ QUESTIONS 1-9: 0
SUM OF ALL RESPONSES TO PHQ QUESTIONS 1-9: 0
1. LITTLE INTEREST OR PLEASURE IN DOING THINGS: 0
SUM OF ALL RESPONSES TO PHQ QUESTIONS 1-9: 0

## 2020-11-12 NOTE — PROGRESS NOTES
[facility]  [address]  Dept: [phone]  Dept MMD:[phone]    Christiano Velez is a 81 y.o. male who presents today for his medical conditions/complaints as notedbelow. Christiano Velez is c/o of Referral - General (wants refferal to PT)      HPI:     HPI    Has the history of the right hip fracture feels like he is getting weaker on the right side -- cannot go up the steps, not giving out on him at all. No pain at all. Will sometimes feel some pain down the outside. Has the right hernia. Will be getting this repaired at some point-- not sure when. Not really painful just annoying at this point,    Has some numbness in his hands-- will wake up in the middle of the night and they are tingling and it is gone.   The last few weeks has been every night     BP Readings from Last 3 Encounters:   11/12/20 130/80   10/13/20 139/78   10/07/20 118/84          (goal 120/80)    Wt Readings from Last 3 Encounters:   11/12/20 216 lb (98 kg)   10/13/20 212 lb 4.8 oz (96.3 kg)   10/07/20 213 lb (96.6 kg)        Past Medical History:   Diagnosis Date    C2 cervical fracture (HCC)     Cancer ([address]     skin    Enlarged prostate     Hyperlipidemia     Thrombocytopenia (HCC)     borderline episodic      Past Surgical History:   Procedure Laterality Date    [facility]  [facility] C2 Fracture    [facility]  09/2016    [facility]    HIP SURGERY Right     ORIF after fall on the ice    HIP SURGERY Right 01/2015    UPPER GASTROINTESTINAL ENDOSCOPY  06/03/2019       Family History   Problem Relation Age of Onset    Stroke Father     Coronary Art Dis Father     Stroke Sister        Social History     Tobacco Use    Smoking status: Never Smoker    Smokeless tobacco: Never Used   Substance Use Topics    Alcohol use: Yes     Frequency: Monthly or less     Drinks per session: 1 or 2     Binge frequency: Never      Current Outpatient Medications   Medication Sig Dispense Refill    ketoconazole (NIZORAL) 2 % cream Apply topically daily. 30 g 1    finasteride (PROSCAR) 5 MG tablet TAKE 1 TABLET DAILY 90 tablet 3    docusate sodium (COLACE) 100 MG capsule Take 1 capsule by mouth 2 times daily 60 capsule 0    pantoprazole (PROTONIX) 20 MG tablet Take 1 tablet by mouth daily 90 tablet 3    atorvastatin (LIPITOR) 20 MG tablet TAKE 1 TABLET DAILY AT BEDTIME 90 tablet 3    fexofenadine (ALLEGRA) 180 MG tablet Take 180 mg by mouth daily      oxybutynin (DITROPAN-XL) 10 MG extended release tablet TAKE 1 TABLET DAILY 90 tablet 3    mometasone (ELOCON) 0.1 % ointment Apply topically daily. 15 g 0    ketoconazole (NIZORAL) 2 % shampoo Apply topically daily as needed. 120 mL 5    polyethylene glycol (GLYCOLAX) powder Take 17 g by mouth daily      melatonin 5 MG TABS tablet Take 10 mg by mouth daily        No current facility-administered medications for this visit. No Known Allergies    Health Maintenance   Topic Date Due    Annual Wellness Visit (AWV)  03/06/2021    Potassium monitoring  03/15/2021    Creatinine monitoring  09/03/2021    Lipid screen  09/11/2021    DTaP/Tdap/Td vaccine (3 - Td) 03/06/2030    Flu vaccine  Completed    Shingles Vaccine  Completed    Pneumococcal 65+ years Vaccine  Completed    Hepatitis A vaccine  Aged Out    Hepatitis B vaccine  Aged Out    Hib vaccine  Aged Out    Meningococcal (ACWY) vaccine  Aged Out       Subjective:      Review of Systems    Objective:     /80 (Site: Left Upper Arm, Position: Sitting, Cuff Size: Medium Adult)   Pulse 57   Wt 216 lb (98 kg)   SpO2 97%   BMI 29.29 kg/m²     Physical Exam  Vitals signs and nursing note reviewed. Constitutional:       Appearance: Normal appearance. HENT:      Head: Normocephalic. Neck:      Musculoskeletal: Normal range of motion and neck supple. No neck rigidity or muscular tenderness.    Cardiovascular:      Rate and Rhythm: Normal rate and regular rhythm. Pulses: Normal pulses. Heart sounds: Normal heart sounds. Pulmonary:      Effort: Pulmonary effort is normal.      Breath sounds: Normal breath sounds. Abdominal:      General: Abdomen is flat. There is no distension. Palpations: There is no mass. Tenderness: There is no abdominal tenderness. Musculoskeletal:      Comments: Large right hydrocele noted. The right hip is nontender there is some weakness of the right hip flexors compared to the left but the right lower extremity neurologic exam is completely unremarkable otherwise. Hip strength is otherwise reserved. Bilateral hand  strength is preserved normal sensation. Positive Tinel's and Phalen's   Skin:     Capillary Refill: Capillary refill takes less than 2 seconds. Neurological:      General: No focal deficit present. Mental Status: He is alert and oriented to person, place, and time. Psychiatric:         Mood and Affect: Mood normal.         Behavior: Behavior normal.         Thought Content: Thought content normal.         Judgment: Judgment normal.         Assessment/Plan:      Diagnosis Orders   1. Abnormality of gait     2. Essential hypertension     3. Right inguinal hernia     4. Right hip pain       At this point Emma Phillips is interested in getting his right inguinal hernia repaired. Would consider getting this taken care of with the hydrocele at the same time. After his recovery. Would then consider the therapy for the hip if he wants to do these both in close proximity. I am concerned that he will have some losses in his strength after the surgery and the therapy can be helpful for this. If he continues to have significant gait disturbance with that hip we may want him to have a reassessment with orthopedics to see if any intervention is needed to help with his gait.     Discussed making sure her hands are not cramped when sleeping and consider wearing splints at bedtime. Could also consider injections to help with carpal tunnel syndrome    Lab Results   Component Value Date    WBC 4.1 (L) 07/21/2020    HGB 12.5 (L) 07/21/2020    HCT 36.3 (L) 07/21/2020    .2 (L) 07/21/2020    CHOL 144 09/11/2020    TRIG 55 09/11/2020    HDL 56 09/11/2020    ALT 17 (L) 03/15/2020    AST 31 03/15/2020     03/15/2020    K 4.7 03/15/2020     03/15/2020    CREATININE 0.82 09/03/2020    BUN 23 09/03/2020    CO2 29 03/15/2020       Return if symptoms worsen or fail to improve, for As scheduled. Patient given educational materials - see patientinstructions. Discussed use, benefit, and side effects of prescribed medications. All patient questions answered. Pt voiced understanding. Reviewed health maintenance. Instructed to continue current medications, diet andexercise. Patient agreed with treatment plan. Follow up as directed.      (Please note that portions of this note were completed with a voice-recognition program. Efforts were made to edit the dictation but occasionally words are mis-transcribed.)    Electronically signed by Janeth Salazar MD on 11/15/2020

## 2020-11-12 NOTE — PATIENT INSTRUCTIONS
Check with Dr. Andria Garza office and let them know that you are having the right hernia repair set up and wonder if the right hydrocele could or should be addressed at the same time? Patient Education        Carpal Tunnel Syndrome: Exercises  Introduction  Here are some examples of exercises for you to try. The exercises may be suggested for a condition or for rehabilitation. Start each exercise slowly. Ease off the exercises if you start to have pain. You will be told when to start these exercises and which ones will work best for you. Warm-up stretches  When you no longer have pain or numbness, you can do exercises to help prevent carpal tunnel syndrome from coming back. Do not do any stretch or movement that is uncomfortable or painful. 1. Rotate your wrist up, down, and from side to side. Repeat 4 times. 2. Stretch your fingers far apart. Relax them, and then stretch them again. Repeat 4 times. 3. Stretch your thumb by pulling it back gently, holding it, and then releasing it. Repeat 4 times. How to do the exercises  Prayer stretch   1. Start with your palms together in front of your chest just below your chin. 2. Slowly lower your hands toward your waistline, keeping your hands close to your stomach and your palms together until you feel a mild to moderate stretch under your forearms. 3. Hold for at least 15 to 30 seconds. Repeat 2 to 4 times. Wrist flexor stretch   1. Extend your arm in front of you with your palm up. 2. Bend your wrist, pointing your hand toward the floor. 3. With your other hand, gently bend your wrist farther until you feel a mild to moderate stretch in your forearm. 4. Hold for at least 15 to 30 seconds. Repeat 2 to 4 times. Wrist extensor stretch   1. Repeat steps 1 through 4 of the stretch above, but begin with your extended hand palm down. Follow-up care is a key part of your treatment and safety.  Be sure to make and go to all appointments, and call your doctor

## 2020-12-01 ENCOUNTER — OFFICE VISIT (OUTPATIENT)
Dept: SURGERY | Age: 85
End: 2020-12-01
Payer: MEDICARE

## 2020-12-01 VITALS
TEMPERATURE: 97.3 F | HEIGHT: 72 IN | WEIGHT: 215 LBS | BODY MASS INDEX: 29.12 KG/M2 | HEART RATE: 70 BPM | SYSTOLIC BLOOD PRESSURE: 148 MMHG | DIASTOLIC BLOOD PRESSURE: 90 MMHG

## 2020-12-01 PROCEDURE — 99213 OFFICE O/P EST LOW 20 MIN: CPT | Performed by: SURGERY

## 2020-12-01 NOTE — PROGRESS NOTES
Tim Gaspar is a 81 y.o. male who presents today to discuss right inguinal hernia repair. Patient has previously been seen for swelling in the right scrotum and has been diagnosed with hydrocele. Further work-up does show that he has a small inguinal hernia as well but is not felt that this is contributing to his scrotal swelling. At his last visit we did discuss repair but the patient wanted to hold off at that time. He comes back in today wishing to go ahead and schedule surgery. Denies any significant changes since last seen. Continues to have some swelling in the right hemiscrotum which is unchanged since last evaluation. Past Medical History:   Diagnosis Date    [facility] [address]     Cancer ([facility] [address]     skin    Enlarged prostate     Hyperlipidemia     Thrombocytopenia (HCC)     borderline episodic       Past Surgical History:   Procedure Laterality Date    [facility] C2 Fracture    COLONOSCOPY  09/2016    [facility]    HIP SURGERY Right     ORIF after fall on the ice    HIP SURGERY Right 01/2015    UPPER GASTROINTESTINAL ENDOSCOPY  06/03/2019       Current Outpatient Medications   Medication Sig Dispense Refill    ketoconazole (NIZORAL) 2 % cream Apply topically daily. 30 g 1    finasteride (PROSCAR) 5 MG tablet TAKE 1 TABLET DAILY 90 tablet 3    docusate sodium (COLACE) 100 MG capsule Take 1 capsule by mouth 2 times daily 60 capsule 0    pantoprazole (PROTONIX) 20 MG tablet Take 1 tablet by mouth daily 90 tablet 3    atorvastatin (LIPITOR) 20 MG tablet TAKE 1 TABLET DAILY AT BEDTIME 90 tablet 3    fexofenadine (ALLEGRA) 180 MG tablet Take 180 mg by mouth daily      oxybutynin (DITROPAN-XL) 10 MG extended release tablet TAKE 1 TABLET DAILY 90 tablet 3    mometasone (ELOCON) 0.1 % ointment Apply topically daily. 15 g 0    ketoconazole (NIZORAL) 2 % shampoo Apply topically daily as needed.  120 mL 5    polyethylene glycol (GLYCOLAX) powder Take 17 g by mouth daily      melatonin 5 MG TABS tablet Take 10 mg by mouth daily        No current facility-administered medications for this visit.         No Known Allergies    Family History   Problem Relation Age of Onset    Stroke Father     Coronary Art Dis Father     Stroke Sister        Social History     Socioeconomic History    Marital status:      Spouse name: Not on file    Number of children: Not on file    Years of education: Not on file    Highest education level: Not on file   Occupational History    Not on file   Social Needs    Financial resource strain: Not on file    Food insecurity     Worry: Not on file     Inability: Not on file   [employer] needs     Medical: Not on file     Non-medical: Not on file   Tobacco Use    Smoking status: Never Smoker    Smokeless tobacco: Never Used   Substance and Sexual Activity    Alcohol use: Yes     Frequency: Monthly or less     Drinks per session: 1 or 2     Binge frequency: Never    Drug use: Never    Sexual activity: Yes     Partners: Female   Lifestyle    Physical activity     Days per week: Not on file     Minutes per session: Not on file    Stress: Not on file   Relationships    Social connections     Talks on phone: Not on file     Gets together: Not on file     Attends [employer] service: Not on file     Active member of club or organization: Not on file     Attends meetings of clubs or organizations: Not on file     Relationship status: Not on file    Intimate partner violence     Fear of current or ex partner: Not on file     Emotionally abused: Not on file     Physically abused: Not on file     Forced sexual activity: Not on file   Other Topics Concern    Not on file   Social History Narrative    Not on file       ROS:   Review of Systems - Negative except as noted in HPI      Objective   Vitals:    12/01/20 1021   BP: (!) 148/90   Pulse: 70   Temp: 97.3 °F (36.3 °C)     General:in no apparent distress and well developed and well nourished  Eyes: No gross abnormalities. Ears, Nose, Throat: hearing grossly normal bilaterally  Neck: neck supple and non tender without mass  Lungs: clear to auscultation without wheezes or rales   Heart: S1S2, no mumurs, RRR  Abdomen: soft, nontender, no HSM, no guarding, no rebound, no masses. Reducible right inguinal hernia present. Extremity: negative  Neuro: CN II-XII grossly intact      Assessment     3  79-year-old male with reducible right inguinal hernia  2. Right hydrocele      Plan     1. After discussion the patient would like to proceed with repair. We discussed open right inguinal hernia repair with mesh. Risk of the procedure including bleeding, infection, scarring, pain, damage to surrounding structures including spermatic cord and nerves, potential loss of testicle, hernia recurrence, long-term pain related to nerve entrapment need for further surgery and anesthesia risk were discussed and consent is obtained. 2.  I will obtain preoperative work-up to include CBC, BMP, chest x-ray and EKG  3. I will also plan to touch base with Dr. Kath Alonso of urology to see if there is any additional treatments or recommendations he has for the hydrocele. If there is additional treatments that he would like to perform we will coordinate to provide surgical interventions at the same anesthesia.     Electronically signed by Armand Springer DO on 12/1/2020 at 10:41 AM      (Please note that portions of this note were completed with a voice recognition program.  Efforts were made to edit the dictations but occasionally words are mis-transcribed.)

## 2020-12-09 LAB
ANION GAP SERPL CALCULATED.3IONS-SCNC: 8.5 MMOL/L
BUN BLDV-MCNC: 22 MG/DL (ref 9–20)
CALCIUM SERPL-MCNC: 9.1 MG/DL (ref 8.4–10.2)
CHLORIDE BLD-SCNC: 104 MMOL/L (ref 98–120)
CO2: 32 MMOL/L (ref 22–31)
CREAT SERPL-MCNC: 0.9 MG/DL (ref 0.7–1.3)
GFR CALCULATED: > 60
GLUCOSE: 95 MG/DL (ref 75–110)
HCT VFR BLD CALC: 40.4 % (ref 42–52)
HEMOGLOBIN: 12.9 (ref 13.8–17.8)
MCH RBC QN AUTO: 31.7 PG (ref 28.5–32.5)
MCHC RBC AUTO-ENTMCNC: 32.1 G/DL (ref 32–37)
MCV RBC AUTO: 98.8 FL (ref 80–94)
PDW BLD-RTO: 12.8 % (ref 10–15.5)
PLATELET # BLD: 135.5 THOU/MM3 (ref 130–400)
POTASSIUM SERPL-SCNC: 4.5 MMOL/L (ref 3.6–5)
RBC: 4.09 M/UL (ref 4.7–6.1)
SODIUM BLD-SCNC: 140 MMOL/L (ref 135–145)
WBC: 4.2 THOU/ML3 (ref 4.8–10.8)

## 2020-12-29 ENCOUNTER — OFFICE VISIT (OUTPATIENT)
Dept: SURGERY | Age: 85
End: 2020-12-29

## 2020-12-29 VITALS
WEIGHT: 215 LBS | HEIGHT: 72 IN | DIASTOLIC BLOOD PRESSURE: 85 MMHG | BODY MASS INDEX: 29.12 KG/M2 | SYSTOLIC BLOOD PRESSURE: 127 MMHG | TEMPERATURE: 97.4 F | HEART RATE: 59 BPM

## 2020-12-29 PROCEDURE — 99024 POSTOP FOLLOW-UP VISIT: CPT | Performed by: SURGERY

## 2020-12-29 NOTE — PROGRESS NOTES
Tim Flanagan is a 81 y.o. male who presents today for follow-up from right inguinal hernia repair 2 weeks ago. Patient states since surgery has been doing well. Has had minimal pain and is no longer taking any pain medication. Ambulating at home without issues. Tolerating diet. Having regular bowel function and voiding without issues. Denies any drainage or other problems at incision. No other complaints. Past Medical History:   Diagnosis Date    [facility] ([facility] [address]     Cancer ([facility] [address]     skin    Enlarged prostate     Hyperlipidemia     Thrombocytopenia (HCC)     borderline episodic       Past Surgical History:   Procedure Laterality Date    [facility] C2 Fracture    [facility]    HIP SURGERY Right     ORIF after fall on the ice    HIP SURGERY Right 01/2015    UPPER GASTROINTESTINAL ENDOSCOPY  06/03/2019       Current Outpatient Medications   Medication Sig Dispense Refill    ketoconazole (NIZORAL) 2 % cream Apply topically daily. 30 g 1    finasteride (PROSCAR) 5 MG tablet TAKE 1 TABLET DAILY 90 tablet 3    docusate sodium (COLACE) 100 MG capsule Take 1 capsule by mouth 2 times daily 60 capsule 0    pantoprazole (PROTONIX) 20 MG tablet Take 1 tablet by mouth daily 90 tablet 3    atorvastatin (LIPITOR) 20 MG tablet TAKE 1 TABLET DAILY AT BEDTIME 90 tablet 3    fexofenadine (ALLEGRA) 180 MG tablet Take 180 mg by mouth daily      oxybutynin (DITROPAN-XL) 10 MG extended release tablet TAKE 1 TABLET DAILY 90 tablet 3    mometasone (ELOCON) 0.1 % ointment Apply topically daily. 15 g 0    ketoconazole (NIZORAL) 2 % shampoo Apply topically daily as needed. 120 mL 5    polyethylene glycol (GLYCOLAX) powder Take 17 g by mouth daily      melatonin 5 MG TABS tablet Take 10 mg by mouth daily        No current facility-administered medications for this visit.         No Known Allergies    Family History Problem Relation Age of Onset    Stroke Father     Coronary Art Dis Father     Stroke Sister        Social History     Socioeconomic History    Marital status:      Spouse name: Not on file    Number of children: Not on file    Years of education: Not on file    Highest education level: Not on file   Occupational History    Not on file   Social Needs    Financial resource strain: Not on file    Food insecurity     Worry: Not on file     Inability: Not on file   [employer] needs     Medical: Not on file     Non-medical: Not on file   Tobacco Use    Smoking status: Never Smoker    Smokeless tobacco: Never Used   Substance and Sexual Activity    Alcohol use: Yes     Frequency: Monthly or less     Drinks per session: 1 or 2     Binge frequency: Never    Drug use: Never    Sexual activity: Yes     Partners: Female   Lifestyle    Physical activity     Days per week: Not on file     Minutes per session: Not on file    Stress: Not on file   Relationships    Social connections     Talks on phone: Not on file     Gets together: Not on file     Attends Mormonism service: Not on file     Active member of club or organization: Not on file     Attends meetings of clubs or organizations: Not on file     Relationship status: Not on file    Intimate partner violence     Fear of current or ex partner: Not on file     Emotionally abused: Not on file     Physically abused: Not on file     Forced sexual activity: Not on file   Other Topics Concern    Not on file   Social History Narrative    Not on file       ROS:   Review of Systems - Negative except as noted in HPI      Objective   Vitals:    12/29/20 0938   BP: 127/85   Pulse: 59   Temp: 97.4 °F (36.3 °C)     General:in no apparent distress and well developed and well nourished  Eyes: No gross abnormalities.   Ears, Nose, Throat: hearing grossly normal bilaterally  Neck: neck supple and non tender without mass Lungs: clear to auscultation without wheezes or rales   Heart: S1S2, no mumurs, RRR  Abdomen: Soft, nondistended, nontender to palpation, bowel sounds present. Right inguinal incision is intact with glue in place. No surrounding fluctuance or erythema. Mild induration consistent with healing ridge. No evidence of hernia on exam or with Valsalva. Extremity: negative  Neuro: CN II-XII grossly intact      Assessment     1. Status post right inguinal hernia repair with mesh      Plan     1. Patient seems to be doing well overall and is healing as expected. I discussed continue activity restrictions for an additional 4 weeks at which time he may return to activity as tolerated. May follow-up as needed.     Electronically signed by Clarice Orta DO on 12/29/2020 at 10:14 AM      (Please note that portions of this note were completed with a voice recognition program.  Efforts were made to edit the dictations but occasionally words are mis-transcribed.)

## 2021-01-26 ENCOUNTER — OFFICE VISIT (OUTPATIENT)
Dept: SURGERY | Age: 86
End: 2021-01-26

## 2021-01-26 VITALS
TEMPERATURE: 97.7 F | SYSTOLIC BLOOD PRESSURE: 170 MMHG | DIASTOLIC BLOOD PRESSURE: 111 MMHG | BODY MASS INDEX: 29.43 KG/M2 | WEIGHT: 217 LBS | HEART RATE: 62 BPM

## 2021-01-26 DIAGNOSIS — Z09 POSTOP CHECK: Primary | ICD-10-CM

## 2021-01-26 PROCEDURE — 99024 POSTOP FOLLOW-UP VISIT: CPT | Performed by: SURGERY

## 2021-03-04 ENCOUNTER — OFFICE VISIT (OUTPATIENT)
Dept: FAMILY MEDICINE CLINIC | Age: 86
End: 2021-03-04
Payer: MEDICARE

## 2021-03-04 VITALS
WEIGHT: 220 LBS | DIASTOLIC BLOOD PRESSURE: 70 MMHG | OXYGEN SATURATION: 94 % | BODY MASS INDEX: 29.84 KG/M2 | HEART RATE: 66 BPM | SYSTOLIC BLOOD PRESSURE: 146 MMHG

## 2021-03-04 DIAGNOSIS — N40.0 BENIGN PROSTATIC HYPERPLASIA, UNSPECIFIED WHETHER LOWER URINARY TRACT SYMPTOMS PRESENT: ICD-10-CM

## 2021-03-04 DIAGNOSIS — R26.9 ABNORMALITY OF GAIT: Primary | ICD-10-CM

## 2021-03-04 DIAGNOSIS — Z96.641 HISTORY OF RIGHT HIP REPLACEMENT: ICD-10-CM

## 2021-03-04 DIAGNOSIS — M25.551 RIGHT HIP PAIN: ICD-10-CM

## 2021-03-04 LAB
C-REACTIVE PROTEIN: 0.6 MG/DL (ref 0–1)
SEDIMENTATION RATE, ERYTHROCYTE: 19 MM/HR (ref 0–20)

## 2021-03-04 PROCEDURE — 99213 OFFICE O/P EST LOW 20 MIN: CPT | Performed by: FAMILY MEDICINE

## 2021-03-04 PROCEDURE — 99212 OFFICE O/P EST SF 10 MIN: CPT

## 2021-03-04 RX ORDER — OXYBUTYNIN CHLORIDE 10 MG/1
TABLET, EXTENDED RELEASE ORAL
Qty: 90 TABLET | Refills: 3 | Status: SHIPPED | OUTPATIENT
Start: 2021-03-04 | End: 2021-11-09 | Stop reason: SDUPTHER

## 2021-03-04 RX ORDER — KETOCONAZOLE 20 MG/ML
SHAMPOO TOPICAL
Qty: 120 ML | Refills: 3 | Status: SHIPPED | OUTPATIENT
Start: 2021-03-04 | End: 2022-01-05

## 2021-03-04 ASSESSMENT — PATIENT HEALTH QUESTIONNAIRE - PHQ9
SUM OF ALL RESPONSES TO PHQ9 QUESTIONS 1 & 2: 0
SUM OF ALL RESPONSES TO PHQ QUESTIONS 1-9: 0
SUM OF ALL RESPONSES TO PHQ QUESTIONS 1-9: 0
2. FEELING DOWN, DEPRESSED OR HOPELESS: 0

## 2021-03-04 NOTE — PROGRESS NOTES
1200 [facility]  [address]  Dept: [phone]  Dept [facility]:[phone]    Tahira Yu is a 81 y.o. male who presents today for his medical conditions/complaints as notedbelow. Tahira Yu is c/o of Leg Pain (right leg pain in the front hip area - Patient has a follow up with Dr Paulette Brooks who told him that everything from his surgery looks good. He does not have any pain when he gets up in the morning but when he bends over to put his shoe on then he will have pain. )      HPI:     DHARA Fraser has a history of a right hip fracture open reduction internal fixation after a fall on the ice in 2014. He then required a total hip replacement in January 2015. Since that time he has had intermittent pain which has been helped at times by multiple episodes of physical therapy. He is never quite had a normal gait with that hip. Saw Dr. Paulette Brooks about 1 month ago and he thought \"the hip looked fine\". Goes to bed at night and no pain but then when he gets up and starts moving through the day he will have a lot of pain sharp stabbing especially when he bends over to for example putting shoes and socks. At this time as he will have a sharp pain in the anterior groin. Will hurt off and on through the day. He does try to stay active as to his sitting is not comfortable either. He has not had any fevers or chills he has not had any swelling has not had any recurrent injuries.     Will have a sharp pain when he bends  BP Readings from Last 3 Encounters:   03/04/21 (!) 146/70   01/26/21 (!) 170/111   12/29/20 127/85          (goal 120/80)    Wt Readings from Last 3 Encounters:   03/04/21 220 lb (99.8 kg)   01/26/21 217 lb (98.4 kg)   12/29/20 215 lb (97.5 kg)        Past Medical History:   Diagnosis Date    C2 cervical fracture ([facility]     Cancer ([facility] [address] [facility]     skin    Enlarged prostate     Hyperlipidemia     Thrombocytopenia (HCC)     borderline episodic Past Surgical History:   Procedure Laterality Date    CERVICAL [facility]  [facility] C2 Fracture    [facility]  09[facility]    HIP SURGERY Right     ORIF after fall on the ice    HIP SURGERY Right 01/2015    UPPER GASTROINTESTINAL ENDOSCOPY  06/03/2019       Family History   Problem Relation Age of Onset    Stroke Father     Coronary Art Dis Father     Stroke Sister        Social History     Tobacco Use    Smoking status: Never Smoker    Smokeless tobacco: Never Used   Substance Use Topics    Alcohol use: Yes     Frequency: Monthly or less     Drinks per session: 1 or 2     Binge frequency: Never      Current Outpatient Medications   Medication Sig Dispense Refill    oxybutynin (DITROPAN-XL) 10 MG extended release tablet TAKE 1 TABLET DAILY 90 tablet 3    ketoconazole (NIZORAL) 2 % shampoo Apply topically daily as needed. 120 mL 3    ketoconazole (NIZORAL) 2 % cream Apply topically daily. 30 g 1    finasteride (PROSCAR) 5 MG tablet TAKE 1 TABLET DAILY 90 tablet 3    docusate sodium (COLACE) 100 MG capsule Take 1 capsule by mouth 2 times daily 60 capsule 0    pantoprazole (PROTONIX) 20 MG tablet Take 1 tablet by mouth daily 90 tablet 3    atorvastatin (LIPITOR) 20 MG tablet TAKE 1 TABLET DAILY AT BEDTIME 90 tablet 3    fexofenadine (ALLEGRA) 180 MG tablet Take 180 mg by mouth daily      mometasone (ELOCON) 0.1 % ointment Apply topically daily. 15 g 0    polyethylene glycol (GLYCOLAX) powder Take 17 g by mouth daily      melatonin 5 MG TABS tablet Take 10 mg by mouth daily        No current facility-administered medications for this visit.       No Known Allergies    Health Maintenance   Topic Date Due    Annual Wellness Visit (AWV)  Never done    COVID-19 Vaccine (2 of 2 - Moderna series) 02/04/2021    Lipid screen  09/11/2021    Potassium monitoring  12/09/2021    Creatinine monitoring  12/09/2021    DTaP/Tdap/Td vaccine (3 - Td) 03/06/2030    Flu vaccine Judgment: Judgment normal.         Assessment/Plan:      Diagnosis Orders   1. Abnormality of gait  Tammy Baker DO, Orthopedic Surgery, [place]   2. Benign prostatic hyperplasia, unspecified whether lower urinary tract symptoms present  oxybutynin (DITROPAN-XL) 10 MG extended release tablet   3. History of right hip replacement  Tammy Wylie Sharroncarrie Baker DO, Orthopedic Surgery, [place]   4. Right hip pain       Refill the oxybutynin as has been working well for him. We will have a reassessment of the hip with a second opinion per the patient request.  May need further imaging to evaluate integrity of the hip joint. Sed rate and CRP to ensure no infection in the joint. Referred to Oswaldo Chen in [facility]. Lab Results   Component Value Date    WBC 4.2 (L) 12/09/2020    HGB 12.9 (L) 12/09/2020    HCT 40.4 (L) 12/09/2020    .5 12/09/2020    CHOL 144 09/11/2020    TRIG 55 09/11/2020    HDL 56 09/11/2020    ALT 17 (L) 03/15/2020    AST 31 03/15/2020     12/09/2020    K 4.5 12/09/2020     12/09/2020    CREATININE 0.9 12/09/2020    BUN 22 (H) 12/09/2020    CO2 32 (H) 12/09/2020       Return for As scheduled. Patient given educational materials - see patientinstructions. Discussed use, benefit, and side effects of prescribed medications. All patient questions answered. Pt voiced understanding. Reviewed health maintenance. Instructed to continue current medications, diet andexercise. Patient agreed with treatment plan. Follow up as directed.      (Please note that portions of this note were completed with a voice-recognition program. Efforts were made to edit the dictation but occasionally words are mis-transcribed.)    Electronically signed by Anabel Gotti MD on 3/7/2021

## 2021-03-16 DIAGNOSIS — M25.551 RIGHT HIP PAIN: Primary | ICD-10-CM

## 2021-03-17 ENCOUNTER — OFFICE VISIT (OUTPATIENT)
Dept: ORTHOPEDIC SURGERY | Age: 86
End: 2021-03-17
Payer: MEDICARE

## 2021-03-17 ENCOUNTER — HOSPITAL ENCOUNTER (OUTPATIENT)
Age: 86
Discharge: HOME | End: 2021-03-17
Payer: MEDICARE

## 2021-03-17 VITALS — HEIGHT: 72 IN | WEIGHT: 220 LBS | TEMPERATURE: 98 F | BODY MASS INDEX: 29.8 KG/M2 | HEART RATE: 76 BPM

## 2021-03-17 DIAGNOSIS — Z96.641 H/O TOTAL HIP ARTHROPLASTY, RIGHT: ICD-10-CM

## 2021-03-17 DIAGNOSIS — M25.551 RIGHT HIP PAIN: Primary | ICD-10-CM

## 2021-03-17 DIAGNOSIS — M25.551 RIGHT HIP PAIN: ICD-10-CM

## 2021-03-17 LAB
ALBUMIN SERPL-MCNC: 3.8 G/DL (ref 3.5–5.2)
ALBUMIN/GLOBULIN RATIO: 1.5 (ref 1–2.5)
ALP BLD-CCNC: 82 U/L (ref 40–129)
ALT SERPL-CCNC: 11 U/L (ref 5–41)
ANION GAP SERPL CALCULATED.3IONS-SCNC: 7 MMOL/L (ref 9–17)
AST SERPL-CCNC: 21 U/L
BILIRUB SERPL-MCNC: 0.38 MG/DL (ref 0.3–1.2)
BUN BLDV-MCNC: 22 MG/DL (ref 8–23)
BUN/CREAT BLD: ABNORMAL (ref 9–20)
C-REACTIVE PROTEIN: <3 MG/L (ref 0–5)
CALCIUM SERPL-MCNC: 8.7 MG/DL (ref 8.6–10.4)
CHLORIDE BLD-SCNC: 105 MMOL/L (ref 98–107)
CO2: 28 MMOL/L (ref 20–31)
CREAT SERPL-MCNC: 0.73 MG/DL (ref 0.7–1.2)
GFR AFRICAN AMERICAN: >60 ML/MIN
GFR NON-AFRICAN AMERICAN: >60 ML/MIN
GFR SERPL CREATININE-BSD FRML MDRD: ABNORMAL ML/MIN/{1.73_M2}
GFR SERPL CREATININE-BSD FRML MDRD: ABNORMAL ML/MIN/{1.73_M2}
GLUCOSE BLD-MCNC: 93 MG/DL (ref 70–99)
HCT VFR BLD CALC: 38.6 % (ref 40.7–50.3)
HEMOGLOBIN: 12.2 G/DL (ref 13–17)
MCH RBC QN AUTO: 31.5 PG (ref 25.2–33.5)
MCHC RBC AUTO-ENTMCNC: 31.6 G/DL (ref 28.4–34.8)
MCV RBC AUTO: 99.7 FL (ref 82.6–102.9)
NRBC AUTOMATED: 0 PER 100 WBC
PDW BLD-RTO: 13.1 % (ref 11.8–14.4)
PLATELET # BLD: ABNORMAL K/UL (ref 138–453)
PLATELET, FLUORESCENCE: 143 K/UL (ref 138–453)
PLATELET, IMMATURE FRACTION: 2.4 % (ref 1.1–10.3)
PMV BLD AUTO: ABNORMAL FL (ref 8.1–13.5)
POTASSIUM SERPL-SCNC: 4.7 MMOL/L (ref 3.7–5.3)
RBC # BLD: 3.87 M/UL (ref 4.21–5.77)
SEDIMENTATION RATE, ERYTHROCYTE: 5 MM (ref 0–20)
SODIUM BLD-SCNC: 140 MMOL/L (ref 135–144)
TOTAL PROTEIN: 6.3 G/DL (ref 6.4–8.3)
WBC # BLD: 4.3 K/UL (ref 3.5–11.3)

## 2021-03-17 PROCEDURE — 85055 RETICULATED PLATELET ASSAY: CPT

## 2021-03-17 PROCEDURE — 36415 COLL VENOUS BLD VENIPUNCTURE: CPT

## 2021-03-17 PROCEDURE — 85027 COMPLETE CBC AUTOMATED: CPT

## 2021-03-17 PROCEDURE — 99214 OFFICE O/P EST MOD 30 MIN: CPT | Performed by: ORTHOPAEDIC SURGERY

## 2021-03-17 PROCEDURE — 85652 RBC SED RATE AUTOMATED: CPT

## 2021-03-17 PROCEDURE — 86140 C-REACTIVE PROTEIN: CPT

## 2021-03-17 PROCEDURE — 80053 COMPREHEN METABOLIC PANEL: CPT

## 2021-03-17 ASSESSMENT — ENCOUNTER SYMPTOMS
VOMITING: 0
APNEA: 0
ABDOMINAL PAIN: 0
COUGH: 0
CHEST TIGHTNESS: 0

## 2021-03-17 NOTE — PROGRESS NOTES
[address] [facility] SPORTS [facility]  [address] [address] [address] [address] [address]  Dept: [phone]  Dept Fax: [phone]        Ambulatory Follow Up      Subjective:   Erin Alanis is a 81y.o. year old male who presents to our office today for routine followup regarding his   1. Right hip pain    2. H/O total hip arthroplasty, right    . Chief Complaint   Patient presents with   Doe Patel    Pain     right hip        HPI- Patient is in the office today in consultation for right hip pain. The patient and his wife report that in 2014 he had a fall on black ice. The patient had to have surgery to repair the right femur. Early 2015 he had the hip replaced due to complication with the femur hardware. After the patient had his hip replaced he did not have any more complications or pain. The patient has recently started having pain I the last 6 months with long distance walking and bending down and tieing his shoe. The patient has been walking with a cane and notes improvement with limp and pain with the cane. The patient notes pain get worse throughout the day. The patient notes that it is more of a stretching pain. Riding three wheeled bicycle increases pain to the right hip. Patient denies feeling sick at any point in time in the last 6 months. Review of Systems   Constitutional: Negative for chills and fever. Respiratory: Negative for apnea, cough and chest tightness. Cardiovascular: Negative for chest pain and palpitations. Gastrointestinal: Negative for abdominal pain and vomiting. Genitourinary: Negative for difficulty urinating. Musculoskeletal: Positive for arthralgias (right hip). Negative for gait problem, joint swelling and myalgias. Neurological: Negative for dizziness, weakness and numbness.             I have reviewed the CC, HPI, ROS, PMH, FHX, Social History, and if not present in this note, I have reviewed in the patient's chart. I agree with the documentation provided by other staff and have reviewed their documentation prior to providing my signature indicating agreement. Objective :   Pulse 76   Temp 98 °F (36.7 °C)   Ht 6' (1.829 m)   Wt 220 lb (99.8 kg)   BMI 29.84 kg/m²  Body mass index is 29.84 kg/m². General: Marni Mancilla is a 81 y.o. male who is alert and oriented and sitting comfortably in our office. Ortho Exam  MS:  Trendelenburg gate with ambulation. Incision well healed to the right hip. Mild hip flexor weakness right hip. Negative hip log roll right hip. No outward deformity of the right hip is appreciated. Right hip incision is well-healed without signs of infection. Motor, sensory, vascular examination of the right lower extremity is grossly intact without focal deficits. Neuro: alert and oriented to person and place. Eyes: Extra-ocular muscles intact  Mouth: Oral mucosa moist. No perioral lesions  Pulm: Respirations unlabored and regular. Symmetric chest excursion without outward deformity is noted. Skin: warm, well perfused  Psych:   Patient has good fund of knowledge and displays understanging of exam, diagnosis, and plan. Radiology:     Xr Hip Right (2-3 Views)    Result Date: 3/19/2021  History: Right hip hemiarthroplasty with proximal femoral replacing prosthesis Findings: Low AP pelvis, AP of the right hip, crosstable lateral x-ray of the right hip done in the office today shows proximal femoral replacing hip hemiarthroplasty bipolar prosthesis in good position without complications. No signs of loosening or fracture is appreciated. No further evidence of fracture, subluxation, dislocation, radiopaque foreign body, radiopaque tumors noted. Impression: Right hip proximal femoral replacing hemiarthroplasty in good position as described above. Assessment:      1. Right hip pain    2.  H/O total hip arthroplasty, right       Plan:    Reviewed x-rays with the patient today in the office. Discussed with the patient that x-rays look stable at this time. Discussed with the patient that I would like to rule out a slow growing infection. Will get labs drawn to rule out the infection. I also feel that formal therapy will benefit the patient. The patient is hesitant about formal therapy but will try it out. The patient to complete activities as tolerates. The patient should follow up in the office in 7 weeks after therapy. The patient knows to call with any questions or concerns. Follow up:Return in about 7 weeks (around 5/5/2021). No orders of the defined types were placed in this encounter. Orders Placed This Encounter   Procedures    CBC     Standing Status:   Future     Number of Occurrences:   1     Standing Expiration Date:   3/18/2022    Comprehensive Metabolic Panel     Standing Status:   Future     Number of Occurrences:   1     Standing Expiration Date:   3/18/2022    C-Reactive Protein     Standing Status:   Future     Number of Occurrences:   1     Standing Expiration Date:   3/18/2022    Sedimentation Rate     Standing Status:   Future     Number of Occurrences:   1     Standing Expiration Date:   3/18/2022    External Referral To Physical Therapy     Referral Priority:   Routine     Referral Type:   Eval and Treat     Referral Reason:   Specialty Services Required     Requested Specialty:   Physical Therapy     Number of Visits Requested:   1     I, Rachid Rivers LPN am scribing for and in the presence of Dr. Iqra Staples  3/21/2021 1:27 PM     I have reviewed and made changes accordingly to the work scribed by Rachid Rivers LPN. The documentation accurately reflects work and decisions made by me. I have also reviewed documentation completed by clinical staff.     Iqra Staples DO, [facility]  3/21/2021 1:28 PM    This note is created with the assistance of a speech recognition program.

## 2021-03-18 ENCOUNTER — OFFICE VISIT (OUTPATIENT)
Dept: FAMILY MEDICINE CLINIC | Age: 86
End: 2021-03-18
Payer: MEDICARE

## 2021-03-18 VITALS
DIASTOLIC BLOOD PRESSURE: 82 MMHG | BODY MASS INDEX: 29.57 KG/M2 | HEART RATE: 63 BPM | WEIGHT: 218 LBS | SYSTOLIC BLOOD PRESSURE: 130 MMHG | OXYGEN SATURATION: 95 %

## 2021-03-18 DIAGNOSIS — D64.9 ANEMIA, UNSPECIFIED TYPE: ICD-10-CM

## 2021-03-18 DIAGNOSIS — I10 ESSENTIAL HYPERTENSION: ICD-10-CM

## 2021-03-18 DIAGNOSIS — E78.2 MIXED HYPERLIPIDEMIA: Primary | ICD-10-CM

## 2021-03-18 DIAGNOSIS — R26.9 ABNORMALITY OF GAIT: ICD-10-CM

## 2021-03-18 PROCEDURE — 99213 OFFICE O/P EST LOW 20 MIN: CPT | Performed by: FAMILY MEDICINE

## 2021-03-18 PROCEDURE — 99214 OFFICE O/P EST MOD 30 MIN: CPT | Performed by: FAMILY MEDICINE

## 2021-03-18 ASSESSMENT — PATIENT HEALTH QUESTIONNAIRE - PHQ9
SUM OF ALL RESPONSES TO PHQ QUESTIONS 1-9: 0
2. FEELING DOWN, DEPRESSED OR HOPELESS: 0
1. LITTLE INTEREST OR PLEASURE IN DOING THINGS: 0
SUM OF ALL RESPONSES TO PHQ QUESTIONS 1-9: 0
SUM OF ALL RESPONSES TO PHQ9 QUESTIONS 1 & 2: 0

## 2021-03-18 NOTE — PROGRESS NOTES
1200 [facility]  [address]  Dept: [phone]  Dept A[facility]:[phone]    Abel Garner is a 81 y.o. male who presents today for his medical conditions/complaints as notedbelow. Abel Garner is c/o of Hypertension (6 month f/u)      HPI:     HPI    Torie Grief has been feeling good. He has not been having any significant chest pain shortness of breath palpitations or exertional dyspnea. He has been taking his atorvastatin regularly. He has been trying to watch his diet and is certainly been trying to stay active. He did have several episodes of higher blood pressures in the last couple of months but by enlarge has been in the 120/140 range. He is not any dizziness or lightheadedness. No changes lower extremity edema and has been wearing his compression socks majority of the time. He does continue to have the significant right hip discomfort did see orthopedics and they recommended an evaluation by physical therapy. He does have a appointment scheduled for this. We will follow up there for 7 weeks later.     BP Readings from Last 3 Encounters:   03/18/21 130/82   03/04/21 (!) 146/70   01/26/21 (!) 170/111          (goal 120/80)    Wt Readings from Last 3 Encounters:   03/18/21 218 lb (98.9 kg)   03/17/21 220 lb (99.8 kg)   03/04/21 220 lb (99.8 kg)        Past Medical History:   Diagnosis Date    C2 cervical fracture (HCC)     Cancer ([address]     skin    Enlarged prostate     Hyperlipidemia     Thrombocytopenia ([facility] [address]     borderline episodic      Past Surgical History:   Procedure Laterality Date    [facility] C2 Fracture    COL[facility]  [facility]    [facility]    HIP SURGERY Right     ORIF after fall on the ice    HIP SURGERY Right 01/2015    UPPER GASTROINTESTINAL ENDOSCOPY  06/03/2019       Family History   Problem Relation Age of Onset    Stroke Father     Coronary Art Dis Father     Stroke Sister Social History     Tobacco Use    Smoking status: Never Smoker    Smokeless tobacco: Never Used   Substance Use Topics    Alcohol use: Yes     Frequency: Monthly or less     Drinks per session: 1 or 2     Binge frequency: Never      Current Outpatient Medications   Medication Sig Dispense Refill    oxybutynin (DITROPAN-XL) 10 MG extended release tablet TAKE 1 TABLET DAILY 90 tablet 3    ketoconazole (NIZORAL) 2 % shampoo Apply topically daily as needed. 120 mL 3    ketoconazole (NIZORAL) 2 % cream Apply topically daily. 30 g 1    finasteride (PROSCAR) 5 MG tablet TAKE 1 TABLET DAILY 90 tablet 3    docusate sodium (COLACE) 100 MG capsule Take 1 capsule by mouth 2 times daily 60 capsule 0    pantoprazole (PROTONIX) 20 MG tablet Take 1 tablet by mouth daily 90 tablet 3    atorvastatin (LIPITOR) 20 MG tablet TAKE 1 TABLET DAILY AT BEDTIME 90 tablet 3    fexofenadine (ALLEGRA) 180 MG tablet Take 180 mg by mouth daily      mometasone (ELOCON) 0.1 % ointment Apply topically daily. 15 g 0    polyethylene glycol (GLYCOLAX) powder Take 17 g by mouth daily      melatonin 5 MG TABS tablet Take 10 mg by mouth daily        No current facility-administered medications for this visit.       No Known Allergies    Health Maintenance   Topic Date Due    Annual Wellness Visit (AWV)  Never done    COVID-19 Vaccine (2 - Moderna 2-dose series) 02/04/2021    Lipid screen  09/11/2021    Potassium monitoring  03/17/2022    Creatinine monitoring  03/17/2022    DTaP/Tdap/Td vaccine (3 - Td) 03/06/2030    Flu vaccine  Completed    Shingles Vaccine  Completed    Pneumococcal 65+ years Vaccine  Completed    Hepatitis A vaccine  Aged Out    Hepatitis B vaccine  Aged Out    Hib vaccine  Aged Out    Meningococcal (ACWY) vaccine  Aged Out       Subjective:      Review of Systems    Objective:     /82 (Site: Left Upper Arm, Position: Sitting, Cuff Size: Large Adult)   Pulse 63   Wt 218 lb (98.9 kg)   SpO2 95% BMI 29.57 kg/m²     Physical Exam  Vitals signs and nursing note reviewed. Constitutional:       Appearance: Normal appearance. HENT:      Head: Normocephalic. Neck:      Musculoskeletal: Normal range of motion and neck supple. No neck rigidity or muscular tenderness. Cardiovascular:      Rate and Rhythm: Normal rate and regular rhythm. Pulses: Normal pulses. Heart sounds: Normal heart sounds. Pulmonary:      Effort: Pulmonary effort is normal.      Breath sounds: Normal breath sounds. Abdominal:      General: Abdomen is flat. There is no distension. Palpations: There is no mass. Tenderness: There is no abdominal tenderness. Musculoskeletal:         General: Tenderness present. Right lower leg: Edema present. Left lower leg: Edema present. Comments: The right hip is nontender on the trochanter. Skin:     Capillary Refill: Capillary refill takes less than 2 seconds. Neurological:      General: No focal deficit present. Mental Status: He is alert and oriented to person, place, and time. Psychiatric:         Mood and Affect: Mood normal.         Behavior: Behavior normal.         Thought Content: Thought content normal.         Judgment: Judgment normal.         Assessment/Plan:     1. Mixed hyperlipidemia  Assessment & Plan: Tolerating the atorvastatin well. No changes in his current medications. Check labs annually. Orders:  -     Lipid Panel; Future  2. Essential hypertension  Assessment & Plan:  Blood pressure readings have been well controlled at this time nothing is not in 6 significant amount of pain. We will continue to monitor this regularly. At this point will remain off of the antihypertensives. 3. Abnormality of gait  Assessment & Plan:  Likely related to his previous hip issues as well as his chronic back pain. Shortly current the therapy is the first intervention. Follow-up with orthopedics as scheduled.   4. Anemia, unspecified type  - CBC Auto Differential; Future  -     Vitamin B12; Future    Has history of anemia and has had some changes in the indices. Not significant at this time but will check the B12 and the CBC at his next lab check. Lab Results   Component Value Date    WBC 4.3 03/17/2021    HGB 12.2 (L) 03/17/2021    HCT 38.6 (L) 03/17/2021    PLT See Reflexed IPF Result 03/17/2021    CHOL 144 09/11/2020    TRIG 55 09/11/2020    HDL 56 09/11/2020    ALT 11 03/17/2021    AST 21 03/17/2021     03/17/2021    K 4.7 03/17/2021     03/17/2021    CREATININE 0.73 03/17/2021    BUN 22 03/17/2021    CO2 28 03/17/2021       No follow-ups on file. Patient given educational materials - see patientinstructions. Discussed use, benefit, and side effects of prescribed medications. All patient questions answered. Pt voiced understanding. Reviewed health maintenance. Instructed to continue current medications, diet andexercise. Patient agreed with treatment plan. Follow up as directed.      (Please note that portions of this note were completed with a voice-recognition program. Efforts were made to edit the dictation but occasionally words are mis-transcribed.)    Electronically signed by Bri Vera MD on 3/21/2021

## 2021-03-22 NOTE — ASSESSMENT & PLAN NOTE
Likely related to his previous hip issues as well as his chronic back pain. Shortly current the therapy is the first intervention. Follow-up with orthopedics as scheduled.

## 2021-03-22 NOTE — ASSESSMENT & PLAN NOTE
Blood pressure readings have been well controlled at this time nothing is not in 6 significant amount of pain. We will continue to monitor this regularly. At this point will remain off of the antihypertensives.

## 2021-04-02 DIAGNOSIS — N43.3 HYDROCELE, UNSPECIFIED HYDROCELE TYPE: ICD-10-CM

## 2021-04-02 DIAGNOSIS — I86.1 SCROTAL VARICES: Primary | ICD-10-CM

## 2021-04-02 DIAGNOSIS — N44.2 TESTICULAR CYST: ICD-10-CM

## 2021-04-06 NOTE — PATIENT INSTRUCTIONS
If you need to reach the Urologist or Urology Nurses for any health care questions or concerns please call [phone] and ask to speak with the [facility] Urology Nurse. The phone line is open from 8a-430p Monday thru Friday.

## 2021-04-07 ENCOUNTER — OFFICE VISIT (OUTPATIENT)
Dept: UROLOGY | Age: 86
End: 2021-04-07
Payer: MEDICARE

## 2021-04-07 VITALS
SYSTOLIC BLOOD PRESSURE: 124 MMHG | HEIGHT: 72 IN | OXYGEN SATURATION: 95 % | DIASTOLIC BLOOD PRESSURE: 82 MMHG | HEART RATE: 71 BPM | BODY MASS INDEX: 29.57 KG/M2

## 2021-04-07 DIAGNOSIS — N50.89 SCROTAL SWELLING: ICD-10-CM

## 2021-04-07 DIAGNOSIS — N28.1 RENAL CYST: ICD-10-CM

## 2021-04-07 DIAGNOSIS — N28.89 RENAL MASS: Primary | ICD-10-CM

## 2021-04-07 DIAGNOSIS — N39.41 URGENCY INCONTINENCE: ICD-10-CM

## 2021-04-07 DIAGNOSIS — R35.0 FREQUENCY OF MICTURITION: ICD-10-CM

## 2021-04-07 PROCEDURE — 99214 OFFICE O/P EST MOD 30 MIN: CPT | Performed by: UROLOGY

## 2021-04-07 NOTE — PROGRESS NOTES
Dr. Cervantes Been  MD [facility] Urology Clinic Consultation / New Patient Visit    Patient:  Shakira Serrano  YOB: 1935  Date: 4/7/2021  Consult requested from Ananda Larsen MD     HISTORY OF PRESENT ILLNESS:   The patient is a 81 y.o. male who presents today for follow-up for the following problem(s): right hydrocele  Overall the problem(s) : are worsening. Associated Symptoms: No dysuria, gross hematuria. Pain Severity: Pain Score:   0 - No pain    Today visit:   4/7/21   Tammy Navarro follows with us for a right sided hydrocele, and a renal cyst with complex features. The cyst is small and appears stable on serial imaging. GAEL is reviewed, and the mass measure 2.5 cm. The He states the hydrocele is  no longer bothersome to him,  This actually appears to be a hernia of the right inguinal canal, which has been repaired and improved. BPH with LUTs appears to be controlled on Proscar. AUASS: 7/2. His urinary frequency and urgency are controlled on Oxybutynin. He states he has urinary incontinence is worsening despite medical therapy. 3/31/20 - this visit was performed with a video teleconference.   Doing well   Had a CT scan - multiple renal cysts and a hyperdense cyst  GAEL - demonstrated a renal cyst with solid component, consistent with hyperdense cysto demonstrated on CT scan  Pt asymptomatic  Denies hematuria    3/4/20  Hydrocele is improving, smaller size  - possibly reactive after shoveling snow  New onset frequency and urgency  - frequency x 4  - urgency: mild  Pt is not bothered by this   No pain  No hematuria    1/22/20  Started Saturday after shoveling snow  No pain, no trauma  TUS (reviewed) - no hernia  - complex right hydrocele, no masses, no torsion  Patient not bothered by this       Summary of old records:   (Patient's old records, notes and chart reviewed and summarized above.)    Last several PSA's:  No results found for: PSA    Last total testosterone:  No

## 2021-05-05 ENCOUNTER — OFFICE VISIT (OUTPATIENT)
Dept: ORTHOPEDIC SURGERY | Age: 86
End: 2021-05-05
Payer: MEDICARE

## 2021-05-05 ENCOUNTER — TELEPHONE (OUTPATIENT)
Dept: UROLOGY | Age: 86
End: 2021-05-05

## 2021-05-05 VITALS — BODY MASS INDEX: 29.53 KG/M2 | WEIGHT: 218 LBS | HEIGHT: 72 IN

## 2021-05-05 DIAGNOSIS — M25.551 RIGHT HIP PAIN: Primary | ICD-10-CM

## 2021-05-05 DIAGNOSIS — Z96.641 H/O TOTAL HIP ARTHROPLASTY, RIGHT: ICD-10-CM

## 2021-05-05 PROCEDURE — 99213 OFFICE O/P EST LOW 20 MIN: CPT | Performed by: ORTHOPAEDIC SURGERY

## 2021-05-05 ASSESSMENT — ENCOUNTER SYMPTOMS
CONSTIPATION: 0
NAUSEA: 0
COUGH: 0
DIARRHEA: 0

## 2021-05-05 NOTE — PROGRESS NOTES
in our office. Ortho Exam  MS:  Moderate abductor weakness limp on the right. Patient ambulates without antalgia. Motor, sensory, vascular examination of the right lower extremity is grossly intact without focal deficits. Neuro: alert and oriented to person and place. Eyes: Extra-ocular muscles intact  Mouth: Oral mucosa moist. No perioral lesions  Pulm: Respirations unlabored and regular. Symmetric chest excursion without outward deformity is noted. Skin: warm, well perfused  Psych:   Patient has good fund of knowledge and displays understanging of exam, diagnosis, and plan. Radiology:     No results found. Assessment:      1. Right hip pain    2. H/O total hip arthroplasty, right       Plan:      Went over previous x-rays with patient. Discussed with him that he is doing really well. I do agree that he can transition to land therapy and if he needs more sessions have the therapist reach out to our office. Patient can lay on his right side but if he is having pain to be careful. Did discuss that he may never be able to tie his shoes due to the extreme range of motion that causes especially we don't want him dislocating his prosthesis. He says he will get help. Told him that he needs to continue some sort of rehab lifetime. Patient can follow up as needed. Follow up:Return if symptoms worsen or fail to improve. No orders of the defined types were placed in this encounter. No orders of the defined types were placed in this encounter. Emma Ardon RN am scribing for and in the presence of Dr. Mary Abdi  5/6/2021 8:43 PM      I have reviewed and made changes accordingly to the work scribed by Bridgett Murillo RN. The documentation accurately reflects work and decisions made by me. I have also reviewed documentation completed by clinical staff.     Mary Abdi DO, [facility]  5/6/2021 8:44 PM    This note is created with the assistance of a speech recognition program.  While intending to generate a document that actually reflects the content of the visit, the document can still have some errors including those of syntax and sound a like substitutions which may escape proof reading.  In such instances, actual meaning can be extrapolated by contextual diversion      Electronically signed by Julia Dia on 5/6/2021 at 8:43 PM

## 2021-05-05 NOTE — TELEPHONE ENCOUNTER
Raiford Goldmann from [facility] called requesting a revised order for the US scrotom and testicles. She states the insurance is denying the doppler. She needs diagnostic codes I86.1 and N44.2 on the order with Dr. Wanda Holt. Please fax to [phone]. If you have any questions you can reach Raiford Goldmann at [phone].

## 2021-05-11 LAB — SARS-COV-2, RAPID: NEGATIVE

## 2021-05-13 ENCOUNTER — TELEPHONE (OUTPATIENT)
Dept: UROLOGY | Age: 86
End: 2021-05-13

## 2021-05-13 NOTE — TELEPHONE ENCOUNTER
Patient states he had a procedure with Dr. Patel Hernandez on 5/12/2021 and was instructed to follow up in 4-6 weeks. No availability in schedule until 7/7/2021. Please call [place]ff[place] back at [phone].

## 2021-06-23 ENCOUNTER — OFFICE VISIT (OUTPATIENT)
Dept: UROLOGY | Age: 86
End: 2021-06-23
Payer: MEDICARE

## 2021-06-23 VITALS
OXYGEN SATURATION: 94 % | SYSTOLIC BLOOD PRESSURE: 132 MMHG | BODY MASS INDEX: 30.07 KG/M2 | HEIGHT: 72 IN | HEART RATE: 64 BPM | RESPIRATION RATE: 16 BRPM | DIASTOLIC BLOOD PRESSURE: 86 MMHG | WEIGHT: 222 LBS

## 2021-06-23 DIAGNOSIS — Z01.818 PRE-OP EVALUATION: ICD-10-CM

## 2021-06-23 DIAGNOSIS — Z01.811 PRE-OP CHEST EXAM: Primary | ICD-10-CM

## 2021-06-23 PROCEDURE — 99214 OFFICE O/P EST MOD 30 MIN: CPT | Performed by: UROLOGY

## 2021-06-23 NOTE — PROGRESS NOTES
Dr. Elfredia Aschoff, MD MD  [facility] Consultation / New Patient Visit    Patient:  Nakul Carrasco  YOB: 1935  Date: 6/23/2021  Consult requested from Yusra Frances MD     HISTORY OF PRESENT ILLNESS:   The patient is a 81 y.o. male who presents today for follow-up for the following problem(s): right hydrocele  Overall the problem(s) : are worsening. Associated Symptoms: No dysuria, gross hematuria. Pain Severity:      Today visit:   6/23/21  Today we discuss BPH with LUTs, and his findings of enlarged prostate with complex features. He is interested in [place]. We have a discussion with him and Dr. Cheryl Doyle, who is present in the room. Risks benefits and alternative procedures are explained, informed consent is obtained, and the patient elects to proceed. 4/7/21   Junnie Ours follows with us for a right sided hydrocele, and a renal cyst with complex features. The cyst is small and appears stable on serial imaging. GAEL is reviewed, and the mass measure 2.5 cm. The He states the hydrocele is  no longer bothersome to him,  This actually appears to be a hernia of the right inguinal canal, which has been repaired and improved. BPH with LUTs appears to be controlled on Proscar. AUASS: 7/2. His urinary frequency and urgency are controlled on Oxybutynin. He states he has urinary incontinence is worsening despite medical therapy. 3/31/20 - this visit was performed with a video teleconference.   Doing well   Had a CT scan - multiple renal cysts and a hyperdense cyst  GAEL - demonstrated a renal cyst with solid component, consistent with hyperdense cysto demonstrated on CT scan  Pt asymptomatic  Denies hematuria    3/4/20  Hydrocele is improving, smaller size  - possibly reactive after shoveling snow  New onset frequency and urgency  - frequency x 4  - urgency: mild  Pt is not bothered by this   No pain  No hematuria    1/22/20  Started Saturday after shoveling snow  No pain, no trauma  TUS (reviewed) - no hernia  - complex right hydrocele, no masses, no torsion  Patient not bothered by this       Summary of old records:   (Patient's old records, notes and chart reviewed and summarized above.)    Last several PSA's:  No results found for: PSA    Last total testosterone:  No results found for: TESTOSTERONE    Urinalysis today:  No results found for this visit on 06/23/21. Last BUN and creatinine:  Lab Results   Component Value Date    BUN 22 03/17/2021     Lab Results   Component Value Date    CREATININE 0.73 03/17/2021       Imaging Reviewed during this Office Visit:   (results were independently reviewed by physician and radiology report verified)    PAST MEDICAL, FAMILY AND SOCIAL HISTORY:  Past Medical History:   Diagnosis Date    [facility]     Cancer ([facility] [facility]     skin    Enlarged prostate     Hyperlipidemia     Thrombocytopenia ([facility] [facility]     borderline episodic     Past Surgical History:   Procedure Laterality Date    CERVICAL SPINE SURGERY  03/29/2016    [facility]2 Fracture    [facility]    HIP SURGERY Right     ORIF after fall on the ice    HIP SURGERY Right 01/2015    UPPER GASTROINTESTINAL ENDOSCOPY  06/03/2019     Family History   Problem Relation Age of Onset    Stroke Father     Coronary Art Dis Father     Stroke Sister      Outpatient Medications Marked as Taking for the 6/23/21 encounter (Office Visit) with Rc Tsang MD   Medication Sig Dispense Refill    oxybutynin (DITROPAN-XL) 10 MG extended release tablet TAKE 1 TABLET DAILY 90 tablet 3    ketoconazole (NIZORAL) 2 % shampoo Apply topically daily as needed. 120 mL 3    ketoconazole (NIZORAL) 2 % cream Apply topically daily.  30 g 1    finasteride (PROSCAR) 5 MG tablet TAKE 1 TABLET DAILY 90 tablet 3    docusate sodium (COLACE) 100 MG capsule Take 1 capsule by mouth 2 times daily 60 capsule 0    pantoprazole (PROTONIX) 20 MG tablet Take 1 tablet by mouth daily 90 tablet 3    atorvastatin (LIPITOR) 20 MG tablet TAKE 1 TABLET DAILY AT BEDTIME 90 tablet 3    fexofenadine (ALLEGRA) 180 MG tablet Take 180 mg by mouth daily      mometasone (ELOCON) 0.1 % ointment Apply topically daily. 15 g 0    polyethylene glycol (GLYCOLAX) powder Take 17 g by mouth daily      melatonin 5 MG TABS tablet Take 10 mg by mouth daily          Patient has no known allergies. Social History     Tobacco Use   Smoking Status Never Smoker   Smokeless Tobacco Never Used       Social History     Substance and Sexual Activity   Alcohol Use Yes    Comment: occasionally       REVIEW OF SYSTEMS:  Constitutional: negative  Eyes: negative  Respiratory: negative  Cardiovascular: negative  Gastrointestinal: negative  Musculoskeletal: negative  Genitourinary: negative  Skin: negative   Neurological: negative  Hematological/Lymphatic: negative  Psychological: negative    Physical Exam:    This a 81 y.o. male   Vitals:    06/23/21 1144   BP: 132/86   Pulse:    Resp:    SpO2:      Constitutional: Patient in no acute distress   Neuro: alert and oriented to person place and time. Psych: Mood and affect normal.  Head: atraumatic normocephalic  Eyes: EOMi  HEENT: neck supple, trachea midline  Lungs: Respiratory effort normal  FROMx4  Skin:  dry      Assessment and Plan      No diagnosis found. Plan:      No follow-ups on file. Frequency and urgency improved  Hydrocele unchanged - offered treatment: hydrocelectomy, but would like to observe  New right inguinal hernia - repaired by Dr. Jerrod Kim    BPH with LUTs - worsening urinary incontinence - Cystoscopy for BPH  - Continue combo therapy with flomax and oxybutynin    Renal cyst - stable complex cyst - will follow with repeat CT scan in 6 months. Discussed imaging  Renal cyst seen on MRI CT scan and GAEL. Possible \"possible complex features\", small and stable over the last year.   Discussed observation, and possible reimaging in future. He would like to observe, and if appears to grow will order biopsy and consider cryoablation of malignant.       Scrotal US for right inguinal hernia  Referral to gen surgery

## 2021-06-25 ENCOUNTER — TELEPHONE (OUTPATIENT)
Dept: UROLOGY | Age: 86
End: 2021-06-25

## 2021-06-25 DIAGNOSIS — D69.6 THROMBOCYTOPENIA (HCC): Primary | ICD-10-CM

## 2021-06-25 NOTE — TELEPHONE ENCOUNTER
[address]         Patient:Aguila Shannon           :1935           Surgical/Procedure Planned: Valentina NG    Date & Location: TBD at  [facility]       Outpatient   Planned Length of OR: 1 hour    Sedation: general    This is a request for medical clearance.       Estimated Cardiac Risk for Non-Cardiac Surgery/Procedure     Low______ Moderate______ High______    Medication Instructions - Clarification needed by this date:     -Other medications:    Resume medications:     Lovenox indicated: _____Yes _____NO    Provider: Dr. Albert Dotson of Provider Giving Orders for Medication holds:    _____________________________________________

## 2021-06-28 LAB
ANION GAP SERPL CALCULATED.3IONS-SCNC: 7 MMOL/L
BUN BLDV-MCNC: 25 MG/DL (ref 9–20)
CALCIUM SERPL-MCNC: 8.8 MG/DL (ref 8.4–10.2)
CHLORIDE BLD-SCNC: 107 MMOL/L (ref 98–120)
CO2: 29 MMOL/L (ref 22–31)
CREAT SERPL-MCNC: 1 MG/DL (ref 0.7–1.3)
GFR CALCULATED: > 60
GLUCOSE: 87 MG/DL (ref 75–110)
POTASSIUM SERPL-SCNC: 4.3 MMOL/L (ref 3.6–5)
SODIUM BLD-SCNC: 143 MMOL/L (ref 135–145)

## 2021-06-28 NOTE — TELEPHONE ENCOUNTER
:1935       Patient has a remote history of thrombocytopenia and anemia so would like him to have a CBC done prior to the procedure. This order has been put in and our staff will notify him of the need for blood work. Thank you! Surgical/Procedure Planned: Greenlight PVP    Date & Location: TBD at  [facility]       Outpatient   Planned Length of OR: 1 hour    Sedation: general    This is a request for medical clearance.       Estimated Cardiac Risk for Non-Cardiac Surgery/Procedure     Low__x____ Moderate______ High______    Medication Instructions - Clarification needed by this date:     -Other medications:    Resume medications:     Lovenox indicated: _____Yes __x___NO    Provider: Dr. Flo Vasquez of Provider Giving Orders for Medication holds:    _______Ida Vann MD_____________

## 2021-06-28 NOTE — TELEPHONE ENCOUNTER
Noted, thank you! Spoke with Dr. Yvonne Pham, he would be okay with doing procedure at [facility] instead of [place] [address], as long as anesthesia is okay with it. Will fax information to [address].

## 2021-06-29 LAB
BASOPHILS %: 0.62 (ref 0–3)
BASOPHILS ABSOLUTE: 0.03 (ref 0–0.3)
EOSINOPHILS %: 3.23 (ref 0–10)
EOSINOPHILS ABSOLUTE: 0.14 (ref 0–1.1)
HCT VFR BLD CALC: 38.8 % (ref 42–52)
HEMOGLOBIN: 12.1 (ref 13.8–17.8)
LYMPHOCYTE %: 22.53 (ref 20–51.1)
LYMPHOCYTES ABSOLUTE: 0.95 (ref 1–5.5)
MCH RBC QN AUTO: 31.1 PG (ref 28.5–32.5)
MCHC RBC AUTO-ENTMCNC: 31.2 G/DL (ref 32–37)
MCV RBC AUTO: 99.4 FL (ref 80–94)
MONOCYTES %: 12.28 (ref 1.7–9.3)
MONOCYTES ABSOLUTE: 0.52 (ref 0.1–1)
NEUTROPHILS %: 61.35 (ref 42.2–75.2)
NEUTROPHILS ABSOLUTE: 2.58 (ref 2–8.1)
PDW BLD-RTO: 12.5 % (ref 10–15.5)
PLATELET # BLD: 122.6 THOU/MM3 (ref 130–400)
RBC: 3.9 M/UL (ref 4.7–6.1)
WBC: 4.2 THOU/ML3 (ref 4.8–10.8)

## 2021-07-06 DIAGNOSIS — K29.01 GASTROINTESTINAL HEMORRHAGE ASSOCIATED WITH ACUTE GASTRITIS: ICD-10-CM

## 2021-07-06 RX ORDER — PANTOPRAZOLE SODIUM 20 MG/1
TABLET, DELAYED RELEASE ORAL
Qty: 90 TABLET | Refills: 3 | Status: SHIPPED | OUTPATIENT
Start: 2021-07-06 | End: 2022-06-28

## 2021-07-06 NOTE — TELEPHONE ENCOUNTER
Maury Wheeler is requesting a refill on the following medication(s):  Requested Prescriptions     Pending Prescriptions Disp Refills    pantoprazole (PROTONIX) 20 MG tablet [Pharmacy Med Name: PANTOPRAZOLE SOD DR 20 MG TAB] 90 tablet 3     Sig: take 1 tablet by mouth once daily       Last Visit Date (If Applicable):  7/09/1822    Next Visit Date:    9/20/2021

## 2021-07-09 ENCOUNTER — TELEPHONE (OUTPATIENT)
Dept: FAMILY MEDICINE CLINIC | Age: 86
End: 2021-07-09

## 2021-07-09 NOTE — TELEPHONE ENCOUNTER
Pt is scheduled to have procedure and was on hold until CBC was reviewed.  Surgery is wanting to know if this result has been reviewed and if able to proceed with surgery and if so if a note can be placed that pt is ok to proceed with surgery

## 2021-07-20 DIAGNOSIS — N40.0 BENIGN PROSTATIC HYPERPLASIA, UNSPECIFIED WHETHER LOWER URINARY TRACT SYMPTOMS PRESENT: ICD-10-CM

## 2021-07-20 NOTE — TELEPHONE ENCOUNTER
Omer Partida is requesting a refill on the following medication(s):  Requested Prescriptions     Pending Prescriptions Disp Refills    finasteride (PROSCAR) 5 MG tablet [Pharmacy Med Name: FINASTERIDE TABS 5MG] 90 tablet 3     Sig: TAKE 1 TABLET DAILY       Last Visit Date (If Applicable):  6/74/1515    Next Visit Date:    9/20/2021

## 2021-07-21 RX ORDER — FINASTERIDE 5 MG/1
TABLET, FILM COATED ORAL
Qty: 90 TABLET | Refills: 3 | Status: SHIPPED | OUTPATIENT
Start: 2021-07-21 | End: 2022-11-01

## 2021-08-12 ENCOUNTER — TELEPHONE (OUTPATIENT)
Dept: UROLOGY | Age: 86
End: 2021-08-12

## 2021-08-12 NOTE — TELEPHONE ENCOUNTER
Patient needs a 2 week post op appointment with Dr. Hannah Barahona. Next opening not until 9/22/2021. Please call patient back at [phone].

## 2021-08-25 ENCOUNTER — OFFICE VISIT (OUTPATIENT)
Dept: UROLOGY | Age: 86
End: 2021-08-25
Payer: MEDICARE

## 2021-08-25 VITALS
DIASTOLIC BLOOD PRESSURE: 72 MMHG | HEIGHT: 72 IN | SYSTOLIC BLOOD PRESSURE: 132 MMHG | HEART RATE: 98 BPM | OXYGEN SATURATION: 94 % | BODY MASS INDEX: 30.11 KG/M2

## 2021-08-25 DIAGNOSIS — N40.1 BENIGN PROSTATIC HYPERPLASIA WITH INCOMPLETE BLADDER EMPTYING: Primary | ICD-10-CM

## 2021-08-25 DIAGNOSIS — R39.14 BENIGN PROSTATIC HYPERPLASIA WITH INCOMPLETE BLADDER EMPTYING: Primary | ICD-10-CM

## 2021-08-25 PROCEDURE — 51798 US URINE CAPACITY MEASURE: CPT | Performed by: UROLOGY

## 2021-08-25 PROCEDURE — 99024 POSTOP FOLLOW-UP VISIT: CPT | Performed by: UROLOGY

## 2021-08-25 NOTE — PROGRESS NOTES
frequency x 4  - urgency: mild  Pt is not bothered by this   No pain  No hematuria    1/22/20  Started Saturday after shoveling snow  No pain, no trauma  TUS (reviewed) - no hernia  - complex right hydrocele, no masses, no torsion  Patient not bothered by this       Summary of old records:   (Patient's old records, notes and chart reviewed and summarized above.)    Last several PSA's:  No results found for: PSA    Last total testosterone:  No results found for: TESTOSTERONE    Urinalysis today:  No results found for this visit on 08/25/21. Last BUN and creatinine:  Lab Results   Component Value Date    BUN 25 (H) 06/28/2021     Lab Results   Component Value Date    CREATININE 1.0 06/28/2021       Imaging Reviewed during this Office Visit:   (results were independently reviewed by physician and radiology report verified)    PAST MEDICAL, FAMILY AND SOCIAL HISTORY:  Past Medical History:   Diagnosis Date    [facility] ([facility] [address]     Cancer ([facility] [address]     skin    Enlarged prostate     Hyperlipidemia     Thrombocytopenia ([facility] [address]     borderline episodic     Past Surgical History:   Procedure Laterality Date    [facility] C[facility] Fracture    [facility]    HIP SURGERY Right     ORIF after fall on the ice    HIP SURGERY Right 01/2015    UPPER GASTROINTESTINAL ENDOSCOPY  06/03/2019     Family History   Problem Relation Age of Onset    Stroke Father     Coronary Art Dis Father     Stroke Sister      No outpatient medications have been marked as taking for the 8/25/21 encounter (Office Visit) with Monica Palmer MD.       Patient has no known allergies.   Social History     Tobacco Use   Smoking Status Never Smoker   Smokeless Tobacco Never Used       Social History     Substance and Sexual Activity   Alcohol Use Yes    Comment: occasionally       REVIEW OF SYSTEMS:  Constitutional: negative  Eyes: negative  Respiratory: negative  Cardiovascular: negative  Gastrointestinal: negative  Musculoskeletal: negative  Genitourinary: negative  Skin: negative   Neurological: negative  Hematological/Lymphatic: negative  Psychological: negative    Physical Exam:    This a 81 y.o. male   There were no vitals filed for this visit. Constitutional: Patient in no acute distress   Neuro: alert and oriented to person place and time. Psych: Mood and affect normal.  Head: atraumatic normocephalic  Eyes: EOMi  HEENT: neck supple, trachea midline  Lungs: Respiratory effort normal  FROMx4  Skin:  dry      Assessment and Plan      No diagnosis found. Plan:      No follow-ups on file. Frequency and urgency improved  Follow up3 months    Hydrocele unchanged - offered treatment: hydrocelectomy, but would like to observe  New right inguinal hernia - repaired by Dr. Rebecca Brito    BPH with LUTs - improved after Greenlight PVP    Renal cyst - stable complex cyst - will follow with repeat CT scan in 6 months. Discussed imaging  Renal cyst seen on MRI CT scan and GAEL. Possible \"possible complex features\", small and stable over the last year. Discussed observation, and possible reimaging in future. He would like to observe, and if appears to grow will order biopsy and consider cryoablation of malignant.       Scrotal US for right inguinal hernia  Referral to gen surgery

## 2021-09-17 LAB
BASOPHILS %: 0.53 (ref 0–3)
BASOPHILS ABSOLUTE: 0.03 (ref 0–0.3)
CHOLESTEROL/HDL RATIO: 2.72 RATIO (ref 0–4.5)
CHOLESTEROL: 163 MG/DL (ref 50–200)
EOSINOPHILS %: 4.42 (ref 0–10)
EOSINOPHILS ABSOLUTE: 0.22 (ref 0–1.1)
HCT VFR BLD CALC: 36.4 % (ref 42–52)
HDLC SERPL-MCNC: 60 MG/DL (ref 36–68)
HEMOGLOBIN: 12.5 (ref 13.8–17.8)
LDL CHOLESTEROL CALCULATED: 92.6 MG/DL (ref 0–160)
LYMPHOCYTE %: 21.7 (ref 20–51.1)
LYMPHOCYTES ABSOLUTE: 1.07 (ref 1–5.5)
MCH RBC QN AUTO: 31.6 PG (ref 28.5–32.5)
MCHC RBC AUTO-ENTMCNC: 34.4 G/DL (ref 32–37)
MCV RBC AUTO: 91.9 FL (ref 80–94)
MONOCYTES %: 13.05 (ref 1.7–9.3)
MONOCYTES ABSOLUTE: 0.65 (ref 0.1–1)
NEUTROPHILS %: 60.31 (ref 42.2–75.2)
NEUTROPHILS ABSOLUTE: 2.98 (ref 2–8.1)
PDW BLD-RTO: 12.1 % (ref 10–15.5)
PLATELET # BLD: 135.8 THOU/MM3 (ref 130–400)
RBC: 3.96 M/UL (ref 4.7–6.1)
TRIGL SERPL-MCNC: 52 MG/DL (ref 10–250)
VITAMIN B-12: 319 PG/ML (ref 239–931)
VLDLC SERPL CALC-MCNC: 10 MG/DL (ref 0–50)
WBC: 4.9 THOU/ML3 (ref 4.8–10.8)

## 2021-09-20 ENCOUNTER — OFFICE VISIT (OUTPATIENT)
Dept: FAMILY MEDICINE CLINIC | Age: 86
End: 2021-09-20
Payer: MEDICARE

## 2021-09-20 VITALS
HEIGHT: 74 IN | OXYGEN SATURATION: 96 % | BODY MASS INDEX: 28.75 KG/M2 | RESPIRATION RATE: 20 BRPM | HEART RATE: 61 BPM | SYSTOLIC BLOOD PRESSURE: 122 MMHG | DIASTOLIC BLOOD PRESSURE: 80 MMHG | WEIGHT: 224 LBS

## 2021-09-20 DIAGNOSIS — E78.2 MIXED HYPERLIPIDEMIA: ICD-10-CM

## 2021-09-20 DIAGNOSIS — I10 ESSENTIAL HYPERTENSION: Primary | ICD-10-CM

## 2021-09-20 DIAGNOSIS — D69.6 THROMBOCYTOPENIA (HCC): ICD-10-CM

## 2021-09-20 DIAGNOSIS — L85.8 CUTANEOUS HORN: ICD-10-CM

## 2021-09-20 PROCEDURE — 99213 OFFICE O/P EST LOW 20 MIN: CPT | Performed by: FAMILY MEDICINE

## 2021-09-20 PROCEDURE — 99214 OFFICE O/P EST MOD 30 MIN: CPT | Performed by: FAMILY MEDICINE

## 2021-09-20 RX ORDER — AMOXICILLIN 500 MG/1
CAPSULE ORAL
COMMUNITY
Start: 2021-07-08 | End: 2021-12-01

## 2021-09-20 SDOH — ECONOMIC STABILITY: FOOD INSECURITY: WITHIN THE PAST 12 MONTHS, THE FOOD YOU BOUGHT JUST DIDN'T LAST AND YOU DIDN'T HAVE MONEY TO GET MORE.: NEVER TRUE

## 2021-09-20 SDOH — ECONOMIC STABILITY: FOOD INSECURITY: WITHIN THE PAST 12 MONTHS, YOU WORRIED THAT YOUR FOOD WOULD RUN OUT BEFORE YOU GOT MONEY TO BUY MORE.: NEVER TRUE

## 2021-09-20 ASSESSMENT — SOCIAL DETERMINANTS OF HEALTH (SDOH): HOW HARD IS IT FOR YOU TO PAY FOR THE VERY BASICS LIKE FOOD, HOUSING, MEDICAL CARE, AND HEATING?: NOT HARD AT ALL

## 2021-09-20 NOTE — PROGRESS NOTES
[facility]  [address]  Dept: [phone]  Dept EES:[phone]    Susan Montoya is a 81 y.o. male who presents today for his medical conditions/complaints as notedbelow. Susan Montoya is c/o of Hypertension (6 month f/u )      HPI:     HPI    Saw Dr. Ousmane Amaro-- will be having a follow up CT scan to recheck the complex renal cyst.    Had the prostate surgery. Flow is much better but is still up about 2-3 times at night. Is starting to ride his bike again. Feels like his legs are getting weak from not doing much. Is better with the bike riding and has the therapy exercises. HTN is asx. No dizziness, no leg edema- wears compression hose. Has been taking his lipitor regularly. Labs reviewed today. B12 level is at the lower edge of normal.     GERD sx are well controlled at this time. Bowels move regularly with the stool softener.     BP Readings from Last 3 Encounters:   09/20/21 122/80   08/25/21 132/72   06/23/21 132/86          (goal 120/80)    Wt Readings from Last 3 Encounters:   09/20/21 224 lb (101.6 kg)   06/23/21 222 lb (100.7 kg)   05/05/21 218 lb (98.9 kg)        Past Medical History:   Diagnosis Date    C2 cervical fracture (HCC)     Cancer (HCC)     skin    Enlarged prostate     Hyperlipidemia     Thrombocytopenia (HCC)     borderline episodic      Past Surgical History:   Procedure Laterality Date    [facility] C2 [facility]    [facility]    HIP SURGERY Right     ORIF after fall on the ice    HIP SURGERY Right 01/2015    UPPER GASTROINTESTINAL ENDOSCOPY  06/03/2019       Family History   Problem Relation Age of Onset    Stroke Father     Coronary Art Dis Father     Stroke Sister        Social History     Tobacco Use    Smoking status: Never Smoker    Smokeless tobacco: Never Used   Substance Use Topics    Alcohol use: Yes     Comment: occasionally Current Outpatient Medications   Medication Sig Dispense Refill    finasteride (PROSCAR) 5 MG tablet TAKE 1 TABLET DAILY 90 tablet 3    pantoprazole (PROTONIX) 20 MG tablet take 1 tablet by mouth once daily 90 tablet 3    oxybutynin (DITROPAN-XL) 10 MG extended release tablet TAKE 1 TABLET DAILY 90 tablet 3    ketoconazole (NIZORAL) 2 % shampoo Apply topically daily as needed. 120 mL 3    ketoconazole (NIZORAL) 2 % cream Apply topically daily. 30 g 1    docusate sodium (COLACE) 100 MG capsule Take 1 capsule by mouth 2 times daily 60 capsule 0    atorvastatin (LIPITOR) 20 MG tablet TAKE 1 TABLET DAILY AT BEDTIME 90 tablet 3    fexofenadine (ALLEGRA) 180 MG tablet Take 180 mg by mouth daily      mometasone (ELOCON) 0.1 % ointment Apply topically daily. 15 g 0    polyethylene glycol (GLYCOLAX) powder Take 17 g by mouth daily      melatonin 5 MG TABS tablet Take 10 mg by mouth daily       amoxicillin (AMOXIL) 500 MG capsule take 4 capsules by mouth 1 hour prior to appointment (Patient not taking: Reported on 9/20/2021)       No current facility-administered medications for this visit.      No Known Allergies    Health Maintenance   Topic Date Due    Annual Wellness Visit (AWV)  Never done    COVID-19 Vaccine (2 - Moderna 2-dose series) 02/04/2021    Flu vaccine (1) 09/01/2021    Potassium monitoring  06/28/2022    Creatinine monitoring  06/28/2022    Lipid screen  09/17/2022    DTaP/Tdap/Td vaccine (3 - Td or Tdap) 03/06/2030    Shingles Vaccine  Completed    Pneumococcal 65+ years Vaccine  Completed    Hepatitis A vaccine  Aged Out    Hepatitis B vaccine  Aged Out    Hib vaccine  Aged Out    Meningococcal (ACWY) vaccine  Aged Out       Subjective:      Review of Systems    Objective:     /80 (Site: Left Upper Arm, Position: Sitting, Cuff Size: Large Adult)   Pulse 61   Resp 20   Ht 6' 1.8\" (1.875 m)   Wt 224 lb (101.6 kg)   SpO2 96%   BMI 28.92 kg/m²     Physical Exam  Vitals and nursing note reviewed. Constitutional:       Appearance: Normal appearance. He is well-developed. HENT:      Head: Normocephalic and atraumatic. Eyes:      Conjunctiva/sclera: Conjunctivae normal.   Neck:      Thyroid: No thyromegaly. Vascular: No JVD. Cardiovascular:      Rate and Rhythm: Normal rate and regular rhythm. Pulses: Normal pulses. Heart sounds: Normal heart sounds. No murmur heard. No friction rub. No gallop. Pulmonary:      Effort: Pulmonary effort is normal. No respiratory distress. Breath sounds: Normal breath sounds. Abdominal:      General: Abdomen is flat. There is no distension. Palpations: There is no mass. Tenderness: There is no abdominal tenderness. Musculoskeletal:      Cervical back: Normal range of motion and neck supple. No rigidity. No muscular tenderness. Right lower leg: No edema. Left lower leg: No edema. Comments: Kyphoscoliosis is present at this time   Lymphadenopathy:      Cervical: No cervical adenopathy. Skin:     General: Skin is warm. Capillary Refill: Capillary refill takes less than 2 seconds. Neurological:      General: No focal deficit present. Mental Status: He is alert and oriented to person, place, and time. Psychiatric:         Mood and Affect: Mood normal.         Behavior: Behavior normal.         Thought Content: Thought content normal.         Judgment: Judgment normal.         Assessment/Plan:     1. Essential hypertension  2. Thrombocytopenia ([address]  3. Mixed hyperlipidemia  4. Cutaneous horn      Referral to derm-- will let us know who he would like to see- previous derm retired-for the cutaneous horn and skin survey    With his continued PPI use will recommend B12 supplement. Start a sublingual (dissolvable) B12 vitamin (500 or 1000 iu daily is fine) for the borderline low B12 level. Available over the counter at the pharmacy.      Lab Results   Component Value Date    WBC 4.9 09/17/2021    HGB 12.5 (L) 09/17/2021    HCT 36.4 (L) 09/17/2021    .8 09/17/2021    CHOL 163 09/17/2021    TRIG 52 09/17/2021    HDL 60 09/17/2021    ALT 11 03/17/2021    AST 21 03/17/2021     06/28/2021    K 4.3 06/28/2021     06/28/2021    CREATININE 1.0 06/28/2021    BUN 25 (H) 06/28/2021    CO2 29 06/28/2021       Return in about 6 months (around 3/20/2022) for HTN. Patient given educational materials - see patientinstructions. Discussed use, benefit, and side effects of prescribed medications. All patient questions answered. Pt voiced understanding. Reviewed health maintenance. Instructed to continue current medications, diet andexercise. Patient agreed with treatment plan. Follow up as directed.      (Please note that portions of this note were completed with a voice-recognition program. Efforts were made to edit the dictation but occasionally words are mis-transcribed.)    Electronically signed by Kendra Vera MD on 9/26/2021

## 2021-09-20 NOTE — PATIENT INSTRUCTIONS
Start a sublingual (dissolvable) B12 vitamin (500 or 1000 iu daily is fine) for the borderline low B12 level. Available over the counter at the pharmacy.

## 2021-09-23 ENCOUNTER — TELEPHONE (OUTPATIENT)
Dept: UROLOGY | Age: 86
End: 2021-09-23

## 2021-09-23 NOTE — TELEPHONE ENCOUNTER
Patient called office today asking about future CT and lab dates. States he has down 10/6/21 for CT.  Please advise, Patient call back # [phone]

## 2021-09-23 NOTE — TELEPHONE ENCOUNTER
Contacted patient, placed patient on hold contacted [facility], patient is scheduled on 10/6/21 at [address] [place] [address]

## 2021-09-27 RX ORDER — ATORVASTATIN CALCIUM 20 MG/1
TABLET, FILM COATED ORAL
Qty: 90 TABLET | Refills: 3 | Status: SHIPPED | OUTPATIENT
Start: 2021-09-27 | End: 2022-09-16

## 2021-09-27 NOTE — TELEPHONE ENCOUNTER
Salina Duran is requesting a refill on the following medication(s):  Requested Prescriptions     Pending Prescriptions Disp Refills    atorvastatin (LIPITOR) 20 MG tablet [Pharmacy Med Name: ATORVASTATIN 20 MG TABLET] 90 tablet 3     Sig: take 1 tablet by mouth at bedtime       Last Visit Date (If Applicable):  4/01/5303    Next Visit Date:    3/21/2022

## 2021-09-28 LAB
ANION GAP SERPL CALCULATED.3IONS-SCNC: 6.4 MMOL/L
BUN BLDV-MCNC: 28 MG/DL (ref 9–20)
CALCIUM SERPL-MCNC: 9 MG/DL (ref 8.4–10.2)
CHLORIDE BLD-SCNC: 107 MMOL/L (ref 98–120)
CO2: 29 MMOL/L (ref 22–31)
CREAT SERPL-MCNC: 0.9 MG/DL (ref 0.7–1.3)
GFR CALCULATED: > 60
GLUCOSE: 92 MG/DL (ref 75–110)
POTASSIUM SERPL-SCNC: 4.6 MMOL/L (ref 3.6–5)
SODIUM BLD-SCNC: 142 MMOL/L (ref 135–145)

## 2021-10-21 ENCOUNTER — TELEPHONE (OUTPATIENT)
Dept: UROLOGY | Age: 86
End: 2021-10-21

## 2021-10-21 NOTE — TELEPHONE ENCOUNTER
10/21/2021 Patient called stating he had an MRI done at [facility] on 10/6/2021 and he has not heard anything from Dr Sherley Arteaga about those results.   Call him back at [phone].  (Cha Prieto called for CT results to be faxed to [facility] office)

## 2021-11-01 ENCOUNTER — TELEPHONE (OUTPATIENT)
Dept: FAMILY MEDICINE CLINIC | Age: 86
End: 2021-11-01

## 2021-11-01 NOTE — TELEPHONE ENCOUNTER
----- Message from Seymour Lindsay MA sent at 11/1/2021  9:16 AM EDT -----  Subject: Message to Provider    QUESTIONS  Information for Provider? Getting massage tomorrow and needs script so   insurance will cover it and not pay taxes. Call when ready. ---------------------------------------------------------------------------  --------------  Antonio ACKERMAN  What is the best way for the office to contact you? OK to leave message on   voicemail  Preferred Call Back Phone Number? [phone]  ---------------------------------------------------------------------------  --------------  SCRIPT ANSWERS  Relationship to Patient?  Self

## 2021-11-08 ENCOUNTER — OFFICE VISIT (OUTPATIENT)
Dept: FAMILY MEDICINE CLINIC | Age: 86
End: 2021-11-08
Payer: MEDICARE

## 2021-11-08 VITALS
SYSTOLIC BLOOD PRESSURE: 122 MMHG | OXYGEN SATURATION: 96 % | DIASTOLIC BLOOD PRESSURE: 78 MMHG | WEIGHT: 227 LBS | BODY MASS INDEX: 29.3 KG/M2 | HEART RATE: 58 BPM

## 2021-11-08 DIAGNOSIS — S62.346A CLOSED NONDISPLACED FRACTURE OF BASE OF FIFTH METACARPAL BONE OF RIGHT HAND, INITIAL ENCOUNTER: Primary | ICD-10-CM

## 2021-11-08 DIAGNOSIS — S22.31XA CLOSED FRACTURE OF ONE RIB OF RIGHT SIDE, INITIAL ENCOUNTER: ICD-10-CM

## 2021-11-08 PROCEDURE — 99214 OFFICE O/P EST MOD 30 MIN: CPT | Performed by: FAMILY MEDICINE

## 2021-11-08 PROCEDURE — 99213 OFFICE O/P EST LOW 20 MIN: CPT | Performed by: FAMILY MEDICINE

## 2021-11-08 RX ORDER — LANOLIN ALCOHOL/MO/W.PET/CERES
1000 CREAM (GRAM) TOPICAL DAILY
COMMUNITY

## 2021-11-08 NOTE — PROGRESS NOTES
History     Tobacco Use    Smoking status: Never Smoker    Smokeless tobacco: Never Used   Substance Use Topics    Alcohol use: Yes     Comment: occasionally      Current Outpatient Medications   Medication Sig Dispense Refill    vitamin B-12 (CYANOCOBALAMIN) 1000 MCG tablet Take 1,000 mcg by mouth daily      Elastic Bandages & Supports (WRIST SPLINT) MISC 1 each by Does not apply route daily 1 each 0    atorvastatin (LIPITOR) 20 MG tablet take 1 tablet by mouth at bedtime 90 tablet 3    finasteride (PROSCAR) 5 MG tablet TAKE 1 TABLET DAILY 90 tablet 3    pantoprazole (PROTONIX) 20 MG tablet take 1 tablet by mouth once daily 90 tablet 3    ketoconazole (NIZORAL) 2 % shampoo Apply topically daily as needed. 120 mL 3    ketoconazole (NIZORAL) 2 % cream Apply topically daily. 30 g 1    docusate sodium (COLACE) 100 MG capsule Take 1 capsule by mouth 2 times daily 60 capsule 0    fexofenadine (ALLEGRA) 180 MG tablet Take 180 mg by mouth daily      mometasone (ELOCON) 0.1 % ointment Apply topically daily. 15 g 0    polyethylene glycol (GLYCOLAX) powder Take 17 g by mouth daily      melatonin 5 MG TABS tablet Take 10 mg by mouth daily       oxybutynin (DITROPAN-XL) 10 MG extended release tablet TAKE 1 TABLET DAILY 14 tablet 1    amoxicillin (AMOXIL) 500 MG capsule take 4 capsules by mouth 1 hour prior to appointment (Patient not taking: Reported on 9/20/2021)       No current facility-administered medications for this visit.      No Known Allergies    Health Maintenance   Topic Date Due    Annual Wellness Visit (AWV)  03/06/2021    Flu vaccine (1) 09/01/2021    Lipid screen  09/17/2022    Potassium monitoring  11/06/2022    Creatinine monitoring  11/06/2022    DTaP/Tdap/Td vaccine (3 - Td or Tdap) 03/06/2030    Shingles Vaccine  Completed    Pneumococcal 65+ years Vaccine  Completed    COVID-19 Vaccine  Completed    Hepatitis A vaccine  Aged Out    Hepatitis B vaccine  Aged Out    Hib vaccine Aged Out    Meningococcal (ACWY) vaccine  Aged Out       Subjective:      Review of Systems    Objective:     /78 (Site: Left Upper Arm, Position: Sitting, Cuff Size: Large Adult)   Pulse 58   Wt 227 lb (103 kg)   SpO2 96%   BMI 29.30 kg/m²     Physical Exam  Vitals and nursing note reviewed. Constitutional:       Appearance: Normal appearance. Cardiovascular:      Rate and Rhythm: Normal rate and regular rhythm. Pulses: Normal pulses. Heart sounds: Normal heart sounds. Pulmonary:      Effort: Pulmonary effort is normal.      Breath sounds: Normal breath sounds. Chest:       Musculoskeletal:        Hands:       Cervical back: Neck supple. No tenderness. Right lower leg: Edema present. Left lower leg: Edema present. Comments: 1-2 + ankle edema, (unable to pull up compression socks)   Lymphadenopathy:      Cervical: No cervical adenopathy. Neurological:      Mental Status: He is alert. Assessment/Plan:     1. Closed nondisplaced fracture of base of fifth metacarpal bone of right hand, initial encounter  -     Elastic Bandages & Supports (WRIST SPLINT) MISC; DAILY Starting Mon 11/8/2021, Disp-1 each, R-0, Print  -     XR HAND RIGHT (MIN 3 VIEWS); Future  2. Closed fracture of one rib of right side, initial encounter    Will use a wrist splint that covers the length of the metacarpals demonstrated to Ankita Loera and his family and prescription given to take to [employer] [facility]. Should be well splinted by the other metacarpals and will recheck x-ray in 1 week to verify no displacement of the fragment. Supportive care for the rib fracture. Pain is well controlled at this time with the Tylenol so no additional medication needed. Encourage staying ambulatory using caution and taking 10 deep breaths every hour.   Lab Results   Component Value Date    WBC 4.2 (L) 11/06/2021    HGB 12.0 (L) 11/06/2021    HCT 34.1 (L) 11/06/2021    .7 (L) 11/06/2021    CHOL 163 09/17/2021    TRIG 52 09/17/2021    HDL 60 09/17/2021    ALT 17 11/06/2021    AST 32 11/06/2021     11/06/2021    K 4.3 11/06/2021     11/06/2021    CREATININE 0.8 11/06/2021    BUN 24 (H) 11/06/2021    CO2 31 11/06/2021       Return in about 4 weeks (around 12/6/2021). Patient given educational materials - see patientinstructions. Discussed use, benefit, and side effects of prescribed medications. All patient questions answered. Pt voiced understanding. Reviewed health maintenance. Instructed to continue current medications, diet andexercise. Patient agreed with treatment plan. Follow up as directed.      (Please note that portions of this note were completed with a voice-recognition program. Efforts were made to edit the dictation but occasionally words are mis-transcribed.)    Electronically signed by Zelalem Anderson MD on 11/14/2021

## 2021-11-09 ENCOUNTER — TELEPHONE (OUTPATIENT)
Dept: FAMILY MEDICINE CLINIC | Age: 86
End: 2021-11-09

## 2021-11-09 DIAGNOSIS — N40.0 BENIGN PROSTATIC HYPERPLASIA, UNSPECIFIED WHETHER LOWER URINARY TRACT SYMPTOMS PRESENT: ICD-10-CM

## 2021-11-09 RX ORDER — OXYBUTYNIN CHLORIDE 10 MG/1
TABLET, EXTENDED RELEASE ORAL
Qty: 14 TABLET | Refills: 1 | Status: SHIPPED | OUTPATIENT
Start: 2021-11-09 | End: 2021-11-30

## 2021-11-09 NOTE — TELEPHONE ENCOUNTER
Could he get 7 days worth sent to [facility]. Only has 2 days worth and the script from mail in pharmacy will not get here in time.

## 2021-11-22 ENCOUNTER — TELEPHONE (OUTPATIENT)
Dept: FAMILY MEDICINE CLINIC | Age: 86
End: 2021-11-22

## 2021-11-22 NOTE — TELEPHONE ENCOUNTER
----- Message from [facility] sent at 11/19/2021  2:05 PM EST -----  Subject: Message to Provider    QUESTIONS  Information for Provider? patient would like a phone call from PCP about   restrictions from fall a week ago  ---------------------------------------------------------------------------  --------------  [address]  What is the best way for the office to contact you? OK to leave message on   voicemail  Preferred Call Back Phone Number? [phone]  ---------------------------------------------------------------------------  --------------  SCRIPT ANSWERS  Relationship to Patient?  Self

## 2021-11-28 DIAGNOSIS — N40.0 BENIGN PROSTATIC HYPERPLASIA, UNSPECIFIED WHETHER LOWER URINARY TRACT SYMPTOMS PRESENT: ICD-10-CM

## 2021-11-29 NOTE — TELEPHONE ENCOUNTER
Modesta Roger is requesting a refill on the following medication(s):  Requested Prescriptions     Pending Prescriptions Disp Refills    oxybutynin (DITROPAN-XL) 10 MG extended release tablet [Pharmacy Med Name: OXYBUTYNIN CL ER 10 MG TABLET] 14 tablet 1     Sig: take 1 tablet by mouth once daily       Last Visit Date (If Applicable):  22/3/8725    Next Visit Date:    3/21/2022

## 2021-11-30 RX ORDER — OXYBUTYNIN CHLORIDE 10 MG/1
TABLET, EXTENDED RELEASE ORAL
Qty: 14 TABLET | Refills: 1 | Status: SHIPPED | OUTPATIENT
Start: 2021-11-30 | End: 2022-04-18

## 2021-12-01 ENCOUNTER — OFFICE VISIT (OUTPATIENT)
Dept: UROLOGY | Age: 86
End: 2021-12-01
Payer: MEDICARE

## 2021-12-01 VITALS
DIASTOLIC BLOOD PRESSURE: 80 MMHG | BODY MASS INDEX: 30.09 KG/M2 | WEIGHT: 227 LBS | OXYGEN SATURATION: 96 % | SYSTOLIC BLOOD PRESSURE: 116 MMHG | RESPIRATION RATE: 16 BRPM | HEART RATE: 61 BPM | HEIGHT: 73 IN

## 2021-12-01 DIAGNOSIS — N13.8 HYPERTROPHY OF PROSTATE WITH URINARY OBSTRUCTION: Primary | ICD-10-CM

## 2021-12-01 DIAGNOSIS — R39.15 URGENCY OF URINATION: ICD-10-CM

## 2021-12-01 DIAGNOSIS — N40.1 HYPERTROPHY OF PROSTATE WITH URINARY OBSTRUCTION: Primary | ICD-10-CM

## 2021-12-01 PROCEDURE — 99214 OFFICE O/P EST MOD 30 MIN: CPT | Performed by: UROLOGY

## 2021-12-01 NOTE — PATIENT INSTRUCTIONS
If you need to reach the Urologist or Urology Nurses for any health care questions or concerns please call [phone] and ask to speak with the [facility] Urology Nurse. The phone line is open from 8a-430p Monday thru Friday.

## 2021-12-01 NOTE — PROGRESS NOTES
MD MD [facility] Consultation / New Patient Visit    Patient:  Lei Nuñez  YOB: 1935  Date: 12/1/2021  Consult requested from Liberty James MD     HISTORY OF PRESENT ILLNESS:   The patient is a 81 y.o. male who presents today for follow-up for the following problem(s): right hydrocele  Overall the problem(s) : are worsening. Associated Symptoms: No dysuria, gross hematuria. Pain Severity:      Today visit:   12/1/21    Post os s/p greenlight pvp, doing well - no hematuria. Nocturia 3-4 times. AUASS: 10. 2.  May discontinue finasteride. Will follow renal mass in future - CT scan shows stability of renal mass, most likely hyperdense cyst.  No longer need to follow. 6/23/21  Today we discuss BPH with LUTs, and his findings of enlarged prostate with complex features. He is interested in [place]. We have a discussion with him and Dr. Joaquim Springer, who is present in the room. Risks benefits and alternative procedures are explained, informed consent is obtained, and the patient elects to proceed. 4/7/21   Ever Mathur follows with us for a right sided hydrocele, and a renal cyst with complex features. The cyst is small and appears stable on serial imaging. GAEL is reviewed, and the mass measure 2.5 cm. The He states the hydrocele is  no longer bothersome to him,  This actually appears to be a hernia of the right inguinal canal, which has been repaired and improved. BPH with LUTs appears to be controlled on Proscar. AUASS: 7/2. His urinary frequency and urgency are controlled on Oxybutynin. He states he has urinary incontinence is worsening despite medical therapy. 3/31/20 - this visit was performed with a video teleconference.   Doing well   Had a CT scan - multiple renal cysts and a hyperdense cyst  GAEL - demonstrated a renal cyst with solid component, consistent with hyperdense cysto demonstrated on CT scan  Pt asymptomatic  Denies hematuria    3/4/20  Hydrocele is improving, smaller size  - possibly reactive after shoveling snow  New onset frequency and urgency  - frequency x 4  - urgency: mild  Pt is not bothered by this   No pain  No hematuria    1/22/20  Started Saturday after shoveling snow  No pain, no trauma  TUS (reviewed) - no hernia  - complex right hydrocele, no masses, no torsion  Patient not bothered by this       Summary of old records:   (Patient's old records, notes and chart reviewed and summarized above.)    Last several PSA's:  No results found for: PSA    Last total testosterone:  No results found for: TESTOSTERONE    Urinalysis today:  No results found for this visit on 12/01/21.       Last BUN and creatinine:  Lab Results   Component Value Date    BUN 24 (H) 11/06/2021     Lab Results   Component Value Date    CREATININE 0.8 11/06/2021       Imaging Reviewed during this Office Visit:   (results were independently reviewed by physician and radiology report verified)    PAST MEDICAL, FAMILY AND SOCIAL HISTORY:  Past Medical History:   Diagnosis Date    [facility]     Cancer ([facility]     skin    Enlarged prostate     Hyperlipidemia     Thrombocytopenia ([facility] [address]     borderline episodic     Past Surgical History:   Procedure Laterality Date    [facility] Fracture    [facility]    HIP SURGERY Right     ORIF after fall on the ice    HIP SURGERY Right 01/2015    UPPER GASTROINTESTINAL ENDOSCOPY  06/03/2019     Family History   Problem Relation Age of Onset    Stroke Father     Coronary Art Dis Father     Stroke Sister      Outpatient Medications Marked as Taking for the 12/1/21 encounter (Office Visit) with Fernando Galaviz MD   Medication Sig Dispense Refill    oxybutynin (DITROPAN-XL) 10 MG extended release tablet take 1 tablet by mouth once daily 14 tablet 1    vitamin B-12 (CYANOCOBALAMIN) 1000 MCG tablet Take 1,000 mcg by mouth daily  Elastic Bandages & Supports (WRIST SPLINT) MISC 1 each by Does not apply route daily 1 each 0    atorvastatin (LIPITOR) 20 MG tablet take 1 tablet by mouth at bedtime 90 tablet 3    finasteride (PROSCAR) 5 MG tablet TAKE 1 TABLET DAILY 90 tablet 3    pantoprazole (PROTONIX) 20 MG tablet take 1 tablet by mouth once daily 90 tablet 3    ketoconazole (NIZORAL) 2 % shampoo Apply topically daily as needed. 120 mL 3    ketoconazole (NIZORAL) 2 % cream Apply topically daily. 30 g 1    docusate sodium (COLACE) 100 MG capsule Take 1 capsule by mouth 2 times daily 60 capsule 0    fexofenadine (ALLEGRA) 180 MG tablet Take 180 mg by mouth daily      mometasone (ELOCON) 0.1 % ointment Apply topically daily. 15 g 0    polyethylene glycol (GLYCOLAX) powder Take 17 g by mouth daily      melatonin 5 MG TABS tablet Take 10 mg by mouth daily          Patient has no known allergies. Social History     Tobacco Use   Smoking Status Never Smoker   Smokeless Tobacco Never Used       Social History     Substance and Sexual Activity   Alcohol Use Yes    Comment: occasionally       REVIEW OF SYSTEMS:  Constitutional: negative  Eyes: negative  Respiratory: negative  Cardiovascular: negative  Gastrointestinal: negative  Musculoskeletal: negative  Genitourinary: negative  Skin: negative   Neurological: negative  Hematological/Lymphatic: negative  Psychological: negative    Physical Exam:    This a 81 y.o. male   Vitals:    12/01/21 0955   BP: 116/80   Pulse: 61   Resp: 16   SpO2: 96%     Constitutional: Patient in no acute distress   Neuro: alert and oriented to person place and time. Psych: Mood and affect normal.  Head: atraumatic normocephalic  Eyes: EOMi  HEENT: neck supple, trachea midline  Lungs: Respiratory effort normal  FROMx4  Skin:  dry      Assessment and Plan      1. Hypertrophy of prostate with urinary obstruction    2. Urgency of urination           Plan:      No follow-ups on file.   Frequency and urgency improved with oxybutynin 10 mg, we recommend trying PM dosing to help with nocturia. If still symptomatic we will consider cystoscopy. Follow up3 months    Hydrocele unchanged - offered treatment: hydrocelectomy, but would like to observe  New right inguinal hernia - repaired by Dr. David Perry    BPH with LUTs - improved after Greenlight PVP  - may discontinue finasteride    Renal cyst - stable complex cyst - will follow with repeat CT scan in 6 months. Discussed imaging  Renal cyst seen on MRI CT scan and GAEL. Possible \"possible complex features\", small and stable over the last year. Discussed observation, and possible reimaging in future. He would like to observe, and if appears to grow will order biopsy and consider cryoablation of malignant.       Scrotal US for right inguinal hernia  Referral to gen surgery

## 2021-12-02 ENCOUNTER — OFFICE VISIT (OUTPATIENT)
Dept: FAMILY MEDICINE CLINIC | Age: 86
End: 2021-12-02
Payer: MEDICARE

## 2021-12-02 VITALS
WEIGHT: 229.8 LBS | HEIGHT: 73 IN | SYSTOLIC BLOOD PRESSURE: 128 MMHG | HEART RATE: 60 BPM | DIASTOLIC BLOOD PRESSURE: 58 MMHG | OXYGEN SATURATION: 96 % | BODY MASS INDEX: 30.46 KG/M2

## 2021-12-02 DIAGNOSIS — I10 ESSENTIAL HYPERTENSION: ICD-10-CM

## 2021-12-02 DIAGNOSIS — Z00.00 ROUTINE GENERAL MEDICAL EXAMINATION AT A HEALTH CARE FACILITY: Primary | ICD-10-CM

## 2021-12-02 DIAGNOSIS — S62.346A CLOSED NONDISPLACED FRACTURE OF BASE OF FIFTH METACARPAL BONE OF RIGHT HAND, INITIAL ENCOUNTER: ICD-10-CM

## 2021-12-02 DIAGNOSIS — E78.2 MIXED HYPERLIPIDEMIA: ICD-10-CM

## 2021-12-02 DIAGNOSIS — D61.818 PANCYTOPENIA (HCC): ICD-10-CM

## 2021-12-02 PROCEDURE — 99397 PER PM REEVAL EST PAT 65+ YR: CPT | Performed by: FAMILY MEDICINE

## 2021-12-02 PROCEDURE — 99214 OFFICE O/P EST MOD 30 MIN: CPT | Performed by: FAMILY MEDICINE

## 2021-12-02 PROCEDURE — G0463 HOSPITAL OUTPT CLINIC VISIT: HCPCS | Performed by: FAMILY MEDICINE

## 2021-12-02 PROCEDURE — G0439 PPPS, SUBSEQ VISIT: HCPCS | Performed by: FAMILY MEDICINE

## 2021-12-02 ASSESSMENT — LIFESTYLE VARIABLES
HOW MANY STANDARD DRINKS CONTAINING ALCOHOL DO YOU HAVE ON A TYPICAL DAY: 0
AUDIT-C TOTAL SCORE: 1
HOW OFTEN DO YOU HAVE A DRINK CONTAINING ALCOHOL: 1
HOW OFTEN DO YOU HAVE SIX OR MORE DRINKS ON ONE OCCASION: 0

## 2021-12-02 ASSESSMENT — PATIENT HEALTH QUESTIONNAIRE - PHQ9
1. LITTLE INTEREST OR PLEASURE IN DOING THINGS: 0
SUM OF ALL RESPONSES TO PHQ9 QUESTIONS 1 & 2: 0
SUM OF ALL RESPONSES TO PHQ QUESTIONS 1-9: 0
2. FEELING DOWN, DEPRESSED OR HOPELESS: 0

## 2021-12-02 NOTE — PATIENT INSTRUCTIONS
Personalized Preventive Plan for Juanjose John - 12/2/2021  Medicare offers a range of preventive health benefits. Some of the tests and screenings are paid in full while other may be subject to a deductible, co-insurance, and/or copay. Some of these benefits include a comprehensive review of your medical history including lifestyle, illnesses that may run in your family, and various assessments and screenings as appropriate. After reviewing your medical record and screening and assessments performed today your provider may have ordered immunizations, labs, imaging, and/or referrals for you. A list of these orders (if applicable) as well as your Preventive Care list are included within your After Visit Summary for your review. Other Preventive Recommendations:    · A preventive eye exam performed by an eye specialist is recommended every 1-2 years to screen for glaucoma; cataracts, macular degeneration, and other eye disorders. · A preventive dental visit is recommended every 6 months. · Try to get at least 150 minutes of exercise per week or 10,000 steps per day on a pedometer . · Order or download the FREE \"Exercise & Physical Activity: Your Everyday Guide\" from [employer]. Call 9-668.842.9253 or search [employer] online. · You need 7678-7656 mg of calcium and 5424-5103 IU of vitamin D per day. It is possible to meet your calcium requirement with diet alone, but a vitamin D supplement is usually necessary to meet this goal.  · When exposed to the sun, use a sunscreen that protects against both UVA and UVB radiation with an SPF of 30 or greater. Reapply every 2 to 3 hours or after sweating, drying off with a towel, or swimming. · Always wear a seat belt when traveling in a car. Always wear a helmet when riding a bicycle or motorcycle.

## 2021-12-02 NOTE — PROGRESS NOTES
needed. Yes Mariel Alvarez MD   ketoconazole (NIZORAL) 2 % cream Apply topically daily. Yes Mariel Alvarez MD   docusate sodium (COLACE) 100 MG capsule Take 1 capsule by mouth 2 times daily Yes Mariel Alvarez MD   fexofenadine (ALLEGRA) 180 MG tablet Take 180 mg by mouth daily Yes Historical Provider, MD   mometasone (ELOCON) 0.1 % ointment Apply topically daily.  Yes Mariel Alvarez MD   polyethylene glycol [facility]) powder Take 17 g by mouth daily Yes Historical Provider, MD   melatonin 5 MG TABS tablet Take 10 mg by mouth daily  Yes Historical Provider, MD   Elastic Bandages & Supports (WRIST SPLINT) MISC 1 each by Does not apply route daily  Mariel Alvarez MD         Past Medical History:   Diagnosis Date    [facility] [address]     Cancer ([facility] [address]     skin    Enlarged prostate     Hyperlipidemia     Thrombocytopenia ([facility] [address]     borderline episodic       Past Surgical History:   Procedure Laterality Date    [facility] C2 Fracture    [facility]  09[facility]    HIP SURGERY Right     ORIF after fall on the ice    HIP SURGERY Right 01/2015    UPPER GASTROINTESTINAL ENDOSCOPY  06/03/2019         Family History   Problem Relation Age of Onset    Stroke Father     Coronary Art Dis Father     Stroke Sister        CareTeam (Including outside providers/suppliers regularly involved in providing care):   Patient Care Team:  Mariel Alvarez MD as PCP - General (Family Medicine)  Mariel Alvarez MD as PCP - [facility] Empaneled Provider  Alejo Durant (Orthopedic Surgery)  Armen Alcaraz DO (Dermatology)    Wt Readings from Last 3 Encounters:   12/02/21 229 lb 12.8 oz (104.2 kg)   12/01/21 227 lb (103 kg)   11/08/21 227 lb (103 kg)     Vitals:    12/02/21 1000   BP: (!) 128/58   Site: Left Upper Arm   Position: Sitting   Cuff Size: Large Adult   Pulse: 60   SpO2: 96%   Weight: 229 lb 12.8 oz (104.2 kg)   Height: 6' 1\" (1.854 m)     Body mass index is 30.32 base of fifth metacarpal bone of right hand, initial encounter     4. Essential hypertension     5. Mixed hyperlipidemia       1. Routine general medical examination at a health care facility  -     DNR comfort care - arrest  2. Pancytopenia (HCC)  -     CBC Auto Differential; Future  3. Closed nondisplaced fracture of base of fifth metacarpal bone of right hand, initial encounter  4. Essential hypertension  5. Mixed hyperlipidemia    Will continue to monitor the CBC every 3 to 6 months. As long as his level stays stable no further work-up is indicated. If he starts to have any dip in the labs and would consider referral to hematology. May wean out of the splint when not being active but when he is active using the hand physically then should wear the splint for the next 2 weeks. Hypertension hyperlipidemia are both very stable no changes in his current medications and he is asymptomatic. Patient's complete Health Risk Assessment and screening values have been reviewed and are found in Flowsheets. The following problems were reviewed today and where indicated follow up appointments were made and/or referrals ordered. Positive Risk Factor Screenings with Interventions:     Fall Risk:  Timed Up and Go Test > 12 seconds?  (Complete if either Fall Risk answers are Yes): (!) yes  2 or more falls in past year?: no  Fall with injury in past year?: (!) yes  Fall Risk Interventions:    · See progress note         Health Habits/Nutrition:  Health Habits/Nutrition  Do you exercise for at least 20 minutes 2-3 times per week?: Yes  Have you lost any weight without trying in the past 3 months?: No  Do you eat only one meal per day?: No  Have you seen the dentist within the past year?: Yes  Body mass index: (!) 30.32  Health Habits/Nutrition Interventions:  · none indicated    Hearing/Vision:  No exam data present  Hearing/Vision  Do you or your family notice any trouble with your hearing that hasn't been managed with hearing aids?: (!) Yes  Do you have difficulty driving, watching TV, or doing any of your daily activities because of your eyesight?: No  Have you had an eye exam within the past year?: Yes  Hearing/Vision Interventions:  · Hearing concerns:  patient declines any further evaluation/treatment for hearing issues      Personalized Preventive Plan   Current Health Maintenance Status  Immunization History   Administered Date(s) Administered    COVID-19, [place], Primary or Immunocompromised, PF, 100mcg/0.5mL 01/07/2021, 02/04/2021, 11/05/2021    DTaP (Infanrix) 12/02/2009    Hepatitis A Adult (Havrix, Vaqta) 05/01/2001, 10/20/2001, 11/20/2001    Hepatitis B 04/30/1997, 05/30/1997, 09/24/1997    Hepatitis B (Recombivax HB) 04/30/1997, 05/30/1997, 09/24/1997    Hepatitis B Adult (Engerix-B) 04/30/1997, 05/30/1997, 09/24/1997    Hepatitis B vaccine 04/30/1997, 05/30/1997, 09/24/1997    Influenza A (B1C0-44) Vaccine PF IM 12/31/2009    Influenza Virus Vaccine 09/29/2013, 11/09/2016    Influenza Whole 10/12/2007, 09/28/2009, 10/15/2010, 10/04/2011, 09/25/2012    Influenza, High Dose (Fluzone 65 yrs and older) 10/09/2014, 11/09/2016, 09/25/2017, 09/06/2018, 08/29/2019, 10/13/2021    Influenza, High-dose, Quadv, 65 yrs +, IM (Fluzone) 09/11/2020    Meningococcal B, OMV (Bexsero) 11/16/2011    Meningococcal MPSV4 (Menomune) 11/16/2011    Pneumococcal Conjugate 13-valent ([id number]) 08/17/2015    Pneumococcal Polysaccharide ([id number]) 07/07/2003, 02/25/2010    Polio IPV (IPOL) 05/04/2001    Tdap (Boostrix, Adacel) 03/06/2020    Typhoid Vac, Parenteral, Other Than Acetone-killed, Dried 04/07/2011    Typhoid Vi capsular polysaccharide (Typhim VI) 04/07/2011    Yellow Fever (YF-Vax) 11/01/2011    Zoster Live (Zostavax) 04/18/2012    Zoster Recombinant (Shingrix) 03/29/2018, 06/16/2018        Health Maintenance   Topic Date Due    Annual Wellness Visit (AWV)  03/06/2021    Lipid screen  09/17/2022    Potassium monitoring  11/06/2022    Creatinine monitoring  11/06/2022    DTaP/Tdap/Td vaccine (3 - Td or Tdap) 03/06/2030    Flu vaccine  Completed    Shingles Vaccine  Completed    Pneumococcal 65+ years Vaccine  Completed    COVID-19 Vaccine  Completed    Hepatitis A vaccine  Aged Out    Hepatitis B vaccine  Aged Out    Hib vaccine  Aged Out    Meningococcal (ACWY) vaccine  Aged Out     Recommendations for Angiodroid Due: see orders and patient instructions/AVS.  . Recommended screening schedule for the next 5-10 years is provided to the patient in written form: see Patient Instructions/AVS.    Demar LANG LPN, 47/2/1541, performed the documented evaluation under the direct supervision of the attending physician.

## 2022-01-03 NOTE — PATIENT INSTRUCTIONS
If you need to reach the Urologist or Urology Nurses for any health care questions or concerns please call [phone] and ask to speak with the [facility] Urology Nurse. The phone line is open from 8a-430p Monday thru Friday.

## 2022-01-05 ENCOUNTER — OFFICE VISIT (OUTPATIENT)
Dept: UROLOGY | Age: 87
End: 2022-01-05
Payer: MEDICARE

## 2022-01-05 VITALS
BODY MASS INDEX: 30.32 KG/M2 | HEIGHT: 73 IN | HEART RATE: 62 BPM | SYSTOLIC BLOOD PRESSURE: 122 MMHG | OXYGEN SATURATION: 97 % | DIASTOLIC BLOOD PRESSURE: 64 MMHG

## 2022-01-05 DIAGNOSIS — N40.1 HYPERTROPHY OF PROSTATE WITH URINARY OBSTRUCTION: Primary | ICD-10-CM

## 2022-01-05 DIAGNOSIS — N43.3 HYDROCELE, UNSPECIFIED HYDROCELE TYPE: ICD-10-CM

## 2022-01-05 DIAGNOSIS — R39.15 URGENCY OF URINATION: ICD-10-CM

## 2022-01-05 DIAGNOSIS — N28.89 RENAL MASS: ICD-10-CM

## 2022-01-05 DIAGNOSIS — N13.8 HYPERTROPHY OF PROSTATE WITH URINARY OBSTRUCTION: Primary | ICD-10-CM

## 2022-01-05 PROCEDURE — 99213 OFFICE O/P EST LOW 20 MIN: CPT | Performed by: UROLOGY

## 2022-01-05 RX ORDER — KETOCONAZOLE 20 MG/ML
SHAMPOO TOPICAL
Qty: 120 ML | Refills: 3 | Status: SHIPPED | OUTPATIENT
Start: 2022-01-05

## 2022-01-05 NOTE — PROGRESS NOTES
Dr. Lakisha Chavarria MD MD  [facility] Consultation / New Patient Visit    Patient:  Jarred Fraser  YOB: 1935  Date: 1/5/2022  Consult requested from Fanny Irwin MD     HISTORY OF PRESENT ILLNESS:   The patient is a 81 y.o. male who presents today for follow-up for the following problem(s): right hydrocele  Overall the problem(s) : are worsening. Associated Symptoms: No dysuria, gross hematuria. Pain Severity: Pain Score:   0 - No pain    Today visit:   1/5/22   Post os s/p greenlight pvp, doing well - no hematuria. Nocturia improved on Oxybutynin 10 mg QHS, 2-3 times. AUASS: 10. 2.  May discontinue finasteride. Will follow renal mass in future - CT scan shows stability of renal mass, most likely hyperdense cyst.  No longer need to follow. 6/23/21  Today we discuss BPH with LUTs, and his findings of enlarged prostate with complex features. He is interested in [place]. We have a discussion with him and Dr. Luis M Mancuso, who is present in the room. Risks benefits and alternative procedures are explained, informed consent is obtained, and the patient elects to proceed. 4/7/21   Prashanth Alvarado follows with us for a right sided hydrocele, and a renal cyst with complex features. The cyst is small and appears stable on serial imaging. GAEL is reviewed, and the mass measure 2.5 cm. The He states the hydrocele is  no longer bothersome to him,  This actually appears to be a hernia of the right inguinal canal, which has been repaired and improved. BPH with LUTs appears to be controlled on Proscar. AUASS: 7/2. His urinary frequency and urgency are controlled on Oxybutynin. He states he has urinary incontinence is worsening despite medical therapy. 3/31/20 - this visit was performed with a video teleconference.   Doing well   Had a CT scan - multiple renal cysts and a hyperdense cyst  GAEL - demonstrated a renal cyst with solid component, consistent with hyperdense (CYANOCOBALAMIN) 1000 MCG tablet Take 1,000 mcg by mouth daily      Elastic Bandages & Supports (WRIST SPLINT) MISC 1 each by Does not apply route daily 1 each 0    atorvastatin (LIPITOR) 20 MG tablet take 1 tablet by mouth at bedtime 90 tablet 3    finasteride (PROSCAR) 5 MG tablet TAKE 1 TABLET DAILY 90 tablet 3    pantoprazole (PROTONIX) 20 MG tablet take 1 tablet by mouth once daily 90 tablet 3    ketoconazole (NIZORAL) 2 % shampoo Apply topically daily as needed. 120 mL 3    ketoconazole (NIZORAL) 2 % cream Apply topically daily. 30 g 1    docusate sodium (COLACE) 100 MG capsule Take 1 capsule by mouth 2 times daily 60 capsule 0    fexofenadine (ALLEGRA) 180 MG tablet Take 180 mg by mouth daily      mometasone (ELOCON) 0.1 % ointment Apply topically daily. 15 g 0    polyethylene glycol (GLYCOLAX) powder Take 17 g by mouth daily      melatonin 5 MG TABS tablet Take 10 mg by mouth daily          Patient has no known allergies. Social History     Tobacco Use   Smoking Status Never Smoker   Smokeless Tobacco Never Used       Social History     Substance and Sexual Activity   Alcohol Use Yes    Comment: occasionally       REVIEW OF SYSTEMS:  Constitutional: negative  Eyes: negative  Respiratory: negative  Cardiovascular: negative  Gastrointestinal: negative  Musculoskeletal: negative  Genitourinary: negative  Skin: negative   Neurological: negative  Hematological/Lymphatic: negative  Psychological: negative    Physical Exam:    This a 81 y.o. male   Vitals:    01/05/22 0925   BP: 122/64   Pulse: 62   SpO2: 97%     Constitutional: Patient in no acute distress   Neuro: alert and oriented to person place and time. Psych: Mood and affect normal.  Head: atraumatic normocephalic  Eyes: EOMi  HEENT: neck supple, trachea midline  Lungs: Respiratory effort normal  FROMx4  Skin:  dry      Assessment and Plan      1. Hypertrophy of prostate with urinary obstruction    2. Urgency of urination    3.  Renal mass 4. Hydrocele, unspecified hydrocele type           Plan:      No follow-ups on file. Frequency and urgency with incontinence - improved with oxybutynin 10 mg, we recommend trying PM dosing to help with nocturia. If still symptomatic we will consider cystoscopy.      Hydrocele unchanged - offered treatment: hydrocelectomy, but would like to observe  New right inguinal hernia - repaired by Dr. Clayton Lee    BPH with LUTs - improved after Greenlight PVP  - may discontinue finasteride    Renal cyst - stable complex cyst - patient agrees, no need to follow at this point

## 2022-01-05 NOTE — TELEPHONE ENCOUNTER
Toney Villa is requesting a refill on the following medication(s):  Requested Prescriptions     Pending Prescriptions Disp Refills    ketoconazole (NIZORAL) 2 % shampoo [Pharmacy Med Name: KETOCONAZOLE 2% SHAMPOO] 120 mL 3     Sig: APPLY TO SCALP, LATHHER AND RINSE 2 TIMES WEEKLY       Last Visit Date (If Applicable):  85/3/6563    Next Visit Date:    3/21/2022

## 2022-01-06 ENCOUNTER — TELEPHONE (OUTPATIENT)
Dept: FAMILY MEDICINE CLINIC | Age: 87
End: 2022-01-06

## 2022-01-06 DIAGNOSIS — R05.9 COUGH: Primary | ICD-10-CM

## 2022-01-06 PROCEDURE — 87635 SARS-COV-2 COVID-19 AMP PRB: CPT | Performed by: FAMILY MEDICINE

## 2022-01-06 PROCEDURE — PBSHW POCT COVID-19, RAPID: Performed by: FAMILY MEDICINE

## 2022-01-06 NOTE — TELEPHONE ENCOUNTER
Patient called Dr Estee Clark stating he started with a sore throat, cough, fatigue. She ordered a rapid covid test for patient and asked that he be scheduled tomorrow morning. If negative he is to go home, monitor symptoms, and call office if symptoms worsen. If positive Dr Estee Clark would like him to get sotrovimab infusion at [facility].      (She is currently out of town and would like to know if Dr Alicia Wing or Yulissa Storey would sign the order for her if needed.)

## 2022-01-07 ENCOUNTER — NURSE ONLY (OUTPATIENT)
Dept: FAMILY MEDICINE CLINIC | Age: 87
End: 2022-01-07

## 2022-01-07 ENCOUNTER — OFFICE VISIT (OUTPATIENT)
Dept: FAMILY MEDICINE CLINIC | Age: 87
End: 2022-01-07
Payer: MEDICARE

## 2022-01-07 VITALS
BODY MASS INDEX: 30.35 KG/M2 | DIASTOLIC BLOOD PRESSURE: 82 MMHG | RESPIRATION RATE: 14 BRPM | SYSTOLIC BLOOD PRESSURE: 138 MMHG | HEART RATE: 89 BPM | HEIGHT: 73 IN | WEIGHT: 229 LBS | OXYGEN SATURATION: 98 % | TEMPERATURE: 97.9 F

## 2022-01-07 DIAGNOSIS — Z91.81 AT HIGH RISK FOR FALLS: ICD-10-CM

## 2022-01-07 DIAGNOSIS — U07.1 COVID-19 VIRUS INFECTION: Primary | ICD-10-CM

## 2022-01-07 LAB
Lab: ABNORMAL
QC PASS/FAIL: ABNORMAL
SARS-COV-2 RDRP RESP QL NAA+PROBE: POSITIVE

## 2022-01-07 PROCEDURE — 99202 OFFICE O/P NEW SF 15 MIN: CPT

## 2022-01-07 PROCEDURE — 99203 OFFICE O/P NEW LOW 30 MIN: CPT | Performed by: NURSE PRACTITIONER

## 2022-01-07 ASSESSMENT — ENCOUNTER SYMPTOMS: SORE THROAT: 1

## 2022-01-07 NOTE — TELEPHONE ENCOUNTER
After speaking with Dr Jesus Gaston per her consult with [facility] Sommer pt will be advised to report to [facility] on [facility] to be assessed for infusion or possible admittance by [facility] Sommer

## 2022-01-07 NOTE — PATIENT INSTRUCTIONS
Remain in quarantine for a full 5 days from when your symptoms began. After 5 days if you are fever free you may go in public but only if you remain mask for the next 5 days. [facility] will contact you regarding the infusion. Please go to the emergency room if you become short of breath, have weakness or extreme fatigue or if you feel you may be dehydrated. You may call the office if it is during normal business hours and request a nurse visit where we check you pulse oximetry/oxygen level. If your level is too low you will be sent to the hospital for further treatment. You are being provided a work or school excuse so you may quarantine until you test results are back. If you are COVID positive you will be given an additional excuse to lengthen your quarantine. Practice coughing and deep breathing every hour while awake. If you are laying down, change positions frequently. Try laying on your stomach to open up your lungs more. If you are allowed to take tylenol or ibuprofen you may take these to relieve fever, chills or body aches. Follow up with primary care provider in 1 to 2 days if needed. Patient Education        Learning How To Care for Someone Who Has COVID-19  Things to know  Most people who get COVID-19 will recover with time and home care. Here are some things to know if you're caring for someone who's sick. · Treat the symptoms. Common symptoms include a fever, coughing, and feeling short of breath. Urge the person to get extra rest and drink plenty of fluids to replace fluids lost from fever. To reduce a fever, offer acetaminophen (Tylenol) or ibuprofen (Advil, Motrin). It may also help with muscle aches. Read and follow all instructions on the label. · Watch for signs that the illness is getting worse. The person may need medical care if they're getting sicker (for example, if it's hard to breathe).  But call the doctor's office before you go. They can tell you what to do. Call 911  or emergency services if the person has any of these symptoms:  ? Severe trouble breathing or shortness of breath. ? Constant pain or pressure in their chest.  ? Confusion, or trouble thinking clearly. ? Pale, gray, or blue-colored skin or lips. Some people are more likely to get very sick and need medical care. Call the doctor as soon as symptoms start if the person you're caring for is over 72, smokes, or has a serious health problem, like asthma, heart disease, diabetes, or an immune system problem. · Protect yourself and others. The virus spreads easily from person to person, so take extra care to avoid catching or spreading the infection. ? Keep the sick person away from others as much as you can. § Have the person stay in one room. If you can, give them their own bathroom to use. § Have only one person take care of them. Keep other people--and pets--out of the sickroom. § Have the person wear a mask around other people. This includes when anyone is in the room with them or if they leave their room (for example, to go to the bathroom). § Don't share personal items. These include dishes, cups, towels, and bedding. ? Wash your hands often and well. Use soap and water, and scrub for at least 20 seconds. This is especially important after you've been around the sick person or touched things they've touched. If soap and water aren't handy, use an alcohol-based hand . ? Wear a mask when caring for someone who is sick. And wear a mask when you're around other people after you've cared for someone who's sick. ? Avoid touching your mouth, nose, and eyes. ? Take care with the person's laundry. It's okay to wash the sick person's laundry with yours. If you have them, wear disposable gloves when handling their dirty laundry, and wash your hands well after you touch it.  Wash items in the warmest water allowed for the fabric type, and dry them completely. ? Clean high-touch items every day and anytime the sick person touches them. These include doorknobs, light switches, toilets, counters, and remote controls. Use a household disinfectant or a homemade bleach solution. (Follow the directions on the label.) If the sick person has their own room, have them disinfect it every day. ? Avoid having visitors. If you have to have visitors, everyone needs to wear a mask and stay at least 6 feet (2 meters) away from you. And keep the visit as short as possible. To help protect family and friends, stay in touch with them only by phone or computer. ? If you haven't had COVID-19 in the past 3 months or aren't fully vaccinated, you may need to quarantine. Where can you learn more? Go to [email or web address]://Eco [email or web address].[email or web address] org and sign in to your [employer] account. Enter V615 in the Addy box to learn more about \"Learning How To Care for Someone Who Has COVID-19. \"     If you do not have an account, please click on the \"Sign Up Now\" link. Current as of: March 26, 2021               Content Version: 13.1  © 9048-3909 Healthwise, [facility]. Care instructions adapted under license by [facility] ([facility]). If you have questions about a medical condition or this instruction, always ask your healthcare professional. [place] [address] any warranty or liability for your use of this information. Patient Education        10 Things to Do When You Have COVID-19      Stay home. Don't go to school, work, or public areas. And don't use public transportation, ride-shares, or taxis unless you have no choice. Leave your home only if you need to get medical care. But call the doctor's office first so they know you're coming. And wear a mask. Ask before leaving isolation. Follow your doctor's advice about when it is safe for you to leave isolation. Wear a mask when you are around other people. It can help stop the spread of the virus. Limit contact with people in your home. If possible, stay in a separate bedroom and use a separate bathroom. Avoid contact with pets and other animals. If possible, have a friend or family member care for them while you're sick. Cover your mouth and nose with a tissue when you cough or sneeze. Then throw the tissue in the trash right away. Wash your hands often, especially after you cough or sneeze. Use soap and water, and scrub for at least 20 seconds. If soap and water aren't available, use an alcohol-based hand . Don't share personal household items. These include bedding, towels, cups and glasses, and eating utensils. Clean and disinfect your home every day. Use household  or disinfectant wipes or sprays. If needed, take acetaminophen (Tylenol) or ibuprofen (Advil, Motrin) to relieve fever and body aches. Read and follow all instructions on the label. Current as of: March 26, 2021               Content Version: 13.1  © 2006-2021 [employer]. Care instructions adapted under license by [facility] ([facility]). If you have questions about a medical condition or this instruction, always ask your healthcare professional. [place] [address] any warranty or liability for your use of this information. Patient Education        Learning About Coronavirus [phone])  What is coronavirus (COVID-19)? COVID-19 is a disease caused by a type of coronavirus. This illness was first found in December 2019. It has since spread worldwide. Coronaviruses are a large group of viruses. They cause the common cold. They also cause more serious illnesses like [place] respiratory syndrome (MERS) and severe acute respiratory syndrome (SARS). COVID-19 is caused by a novel coronavirus. That means it's a new type that has not been seen in people before. What are the symptoms? COVID-19 symptoms may include:  · Fever. · Cough.   · Trouble breathing. · Chills or repeated shaking with chills. · Muscle and body aches. · Headache. · Sore throat. · New loss of taste or smell. · Vomiting. · Diarrhea. In severe cases, COVID-19 can cause pneumonia and make it hard to breathe without help from a machine. It can cause death. How is it diagnosed? COVID-19 is diagnosed with a viral test. This may also be called a PCR test or antigen test. It looks for evidence of the virus in your breathing passages or lungs (respiratory system). The test is most often done on a sample from the nose, throat, or lungs. It's sometimes done on a sample of saliva. One way a sample is collected is by putting a long swab into the back of your nose. How is it treated? Mild cases of COVID-19 can be treated at home. Serious cases need treatment in the hospital. Treatment may include medicines to reduce symptoms, plus breathing support such as oxygen therapy or a ventilator. Some people may be placed on their belly to help their oxygen levels. Treatments that may help people who have COVID-19 include:  Antiviral medicines. These medicines treat viral infections. Remdesivir is an example. Immune-based therapy. These medicines help the immune system fight COVID-19. Examples include monoclonal antibodies. Blood thinners. These medicines help prevent blood clots. People with severe illness are at risk for blood clots. How can you protect yourself and others? · Get vaccinated. · Avoid sick people. · Cover your mouth with a tissue when you cough or sneeze. · Wash your hands often, especially after you cough or sneeze. Use soap and water, and scrub for at least 20 seconds. If soap and water aren't available, use an alcohol-based hand . · Avoid touching your mouth, nose, and eyes. Be sure to follow all instructions from the . [facility] and your local health authorities. Here are some examples of specific precautions you may need to take.   · If you are not fully vaccinated:  ? Wear a mask if you have to go to public areas. ? Avoid crowds and try to stay at least 6 feet away from other people. · If you have been exposed to the virus and are not fully vaccinated:  ? Stay home. Don't go to school, work, or public areas. And don't use public transportation, ride-shares, or taxis unless you have no choice. ? Wear a mask if you have to go to public areas, like the pharmacy. · Even if you're fully vaccinated, there's still a small chance you can get and spread COVID-19. If you live in an area where COVID-19 is spreading quickly, wear a mask if you have to go to indoor public areas. You might also want to wear a mask in crowded outdoor areas if you:  ? Have certain health conditions. ? Live with someone who has a compromised immune system. ? Live with someone who is not fully vaccinated. · If you have been exposed and you are fully vaccinated:  ? Talk to your doctor. You may need a COVID-19 test.  ? Wear a mask in public indoor spaces for 14 days or until you test negative for COVID-19. If you're sick:  · Leave your home only if you need to get medical care. But call the doctor's office first so they know you're coming. And wear a mask. · Wear a mask whenever you're around other people. · Limit contact with pets and people in your home. If possible, stay in a separate bedroom and use a separate bathroom. · Clean and disinfect your home every day. Use household  and disinfectant wipes or sprays. Take special care to clean things that you touch with your hands. How can you self-isolate when you have COVID-19? If you have COVID-19, there are things you can do to help avoid spreading the virus to others. · Limit contact with people in your home. If possible, stay in a separate bedroom and use a separate bathroom. · Wear a mask when you are around other people.   · If you have to leave home, avoid crowds and try to stay at least 6 feet away from other people. · Avoid contact with pets and other animals. · Cover your mouth and nose with a tissue when you cough or sneeze. Then throw it in the trash right away. · Wash your hands often, especially after you cough or sneeze. Use soap and water, and scrub for at least 20 seconds. If soap and water aren't available, use an alcohol-based hand . · Don't share personal household items. These include bedding, towels, cups and glasses, and eating utensils. · [address] [facility] [address] in the warmest water allowed for the fabric type, and dry it completely. It's okay to wash other people's laundry with yours. · Clean and disinfect your home. Use household  and disinfectant wipes or sprays. When should you call for help? Call 911 anytime you think you may need emergency care. For example, call if you have life-threatening symptoms, such as:    · You have severe trouble breathing. (You can't talk at all.)     · You have constant chest pain or pressure.     · You are severely dizzy or lightheaded.     · You are confused or can't think clearly.     · You have pale, gray, or blue-colored skin or lips.     · You pass out (lose consciousness) or are very hard to wake up. Call your doctor now or seek immediate medical care if:    · You have moderate trouble breathing. (You can't speak a full sentence.)     · You are coughing up blood (more than about 1 teaspoon).     · You have signs of low blood pressure. These include feeling lightheaded; being too weak to stand; and having cold, pale, clammy skin. Watch closely for changes in your health, and be sure to contact your doctor if:    · Your symptoms get worse.     · You are not getting better as expected.     · You have new or worse symptoms of anxiety, depression, nightmares, or flashbacks. Call before you go to the doctor's office. Follow their instructions. And wear a mask.   Current as of: July 1, 2021               Content Version: 13.1  © 7269-2942 Healthwise Incorporated. Care instructions adapted under license by [facility] ([facility]). If you have questions about a medical condition or this instruction, always ask your healthcare professional. [place] [address] any warranty or liability for your use of this information. Patient Education        Coronavirus ([id number]): Care Instructions  Overview  The coronavirus disease (COVID-19) is caused by a virus. Symptoms may include a fever, a cough, and shortness of breath. It can spread through droplets from coughing and sneezing, breathing, and singing. The virus also can spread when people are in close contact with someone who is infected. Most people have mild symptoms and can take care of themselves at home. If their symptoms get worse, they may need care in a hospital. Treatment may include medicines to reduce symptoms, plus breathing support such as oxygen therapy or a ventilator. It's important to not spread the virus to others. If you have COVID-19, wear a mask anytime you are around other people. It can help stop the spread of the virus. You need to isolate yourself while you are sick. Leave your home only if you need to get medical care or testing. Follow-up care is a key part of your treatment and safety. Be sure to make and go to all appointments, and call your doctor if you are having problems. It's also a good idea to know your test results and keep a list of the medicines you take. How can you care for yourself at home? · Get extra rest. It can help you feel better. · Drink plenty of fluids. This helps replace fluids lost from fever. Fluids may also help ease a scratchy throat. · You can take acetaminophen (Tylenol) or ibuprofen (Advil, Motrin) to reduce a fever. It may also help with muscle and body aches. Read and follow all instructions on the label. · Use petroleum jelly on sore skin. This can help if the skin around your nose and lips becomes sore from rubbing a lot with tissues. If you use oxygen, use a water-based product instead of petroleum jelly. · Keep track of symptoms such as fever and shortness of breath. This can help you know if you need to call your doctor. It can also help you know when it's safe to be around other people. · In some cases, your doctor might suggest that you get a pulse oximeter. How can you self-isolate when you have COVID-19? If you have COVID-19, there are things you can do to help avoid spreading the virus to others. · Limit contact with people in your home. If possible, stay in a separate bedroom and use a separate bathroom. · Wear a mask when you are around other people. · If you have to leave home, avoid crowds and try to stay at least 6 feet away from other people. · Avoid contact with pets and other animals. · Cover your mouth and nose with a tissue when you cough or sneeze. Then throw it in the trash right away. · Wash your hands often, especially after you cough or sneeze. Use soap and water, and scrub for at least 20 seconds. If soap and water aren't available, use an alcohol-based hand . · Don't share personal household items. These include bedding, towels, cups and glasses, and eating utensils. · [address] in the warmest water allowed for the fabric type, and dry it completely. It's okay to wash other people's laundry with yours. · Clean and disinfect your home. Use household  and disinfectant wipes or sprays. When can you end self-isolation for COVID-19? If you know or think that you have the virus, you will need to self-isolate. You can be around others after:  · It's been at least 10 days since your symptoms started and  · You haven't had a fever for 24 hours without taking medicines to lower the fever and  · Your symptoms are improving. If you tested positive but have no symptoms, you can end isolation after 10 days. But if you start to have symptoms, follow the steps above.   Ask your doctor if you need to be tested before you end isolation. This is especially important if you have a weakened immune system. When should you call for help? Call 911 anytime you think you may need emergency care. For example, call if you have life-threatening symptoms, such as:    · You have severe trouble breathing. (You can't talk at all.)     · You have constant chest pain or pressure.     · You are severely dizzy or lightheaded.     · You are confused or can't think clearly.     · You have pale, gray, or blue-colored skin or lips.     · You pass out (lose consciousness) or are very hard to wake up. Call your doctor now or seek immediate medical care if:    · You have moderate trouble breathing. (You can't speak a full sentence.)     · You are coughing up blood (more than about 1 teaspoon).     · You have signs of low blood pressure. These include feeling lightheaded; being too weak to stand; and having cold, pale, clammy skin. Watch closely for changes in your health, and be sure to contact your doctor if:    · Your symptoms get worse.     · You are not getting better as expected.     · You have new or worse symptoms of anxiety, depression, nightmares, or flashbacks. Call before you go to the doctor's office. Follow their instructions. And wear a mask. Current as of: July 1, 2021               Content Version: 13.1  © 2006-2021 Healthwise, Incorporated. Care instructions adapted under license by [address] [place] [address]. If you have questions about a medical condition or this instruction, always ask your healthcare professional. [place] [address] any warranty or liability for your use of this information. Patient Education        Preventing Falls: Care Instructions  Your Care Instructions     Getting around your home safely can be a challenge if you have injuries or health problems that make it easy for you to fall.  Loose rugs and furniture in walkways are among the dangers for many older people who have problems walking or who have poor eyesight. People who have conditions such as arthritis, osteoporosis, or dementia also have to be careful not to fall. You can make your home safer with a few simple measures. Follow-up care is a key part of your treatment and safety. Be sure to make and go to all appointments, and call your doctor if you are having problems. It's also a good idea to know your test results and keep a list of the medicines you take. How can you care for yourself at home? Taking care of yourself  · You may get dizzy if you do not drink enough water. To prevent dehydration, drink plenty of fluids. Choose water and other clear liquids. If you have kidney, heart, or liver disease and have to limit fluids, talk with your doctor before you increase the amount of fluids you drink. · Exercise regularly to improve your strength, muscle tone, and balance. Walk if you can. Swimming may be a good choice if you cannot walk easily. · Have your vision and hearing checked each year or any time you notice a change. If you have trouble seeing and hearing, you might not be able to avoid objects and could lose your balance. · Know the side effects of the medicines you take. Ask your doctor or pharmacist whether the medicines you take can affect your balance. Sleeping pills or sedatives can affect your balance. · Limit the amount of alcohol you drink. Alcohol can impair your balance and other senses. · Ask your doctor whether calluses or corns on your feet need to be removed. If you wear loose-fitting shoes because of calluses or corns, you can lose your balance and fall. · Talk to your doctor if you have numbness in your feet. Preventing falls at home  · Remove raised doorway thresholds, throw rugs, and clutter. Repair loose carpet or raised areas in the floor. · Move furniture and electrical cords to keep them out of walking paths.   · Use nonskid floor wax, and wipe up spills right away, especially on ceramic tile floors. · If you use a walker or cane, put rubber tips on it. If you use crutches, clean the bottoms of them regularly with an abrasive pad, such as steel wool. · Keep your house well lit, especially Amye Medici, and outside walkways. Use night-lights in areas such as hallways and bathrooms. Add extra light switches or use remote switches (such as switches that go on or off when you clap your hands) to make it easier to turn lights on if you have to get up during the night. · Install sturdy handrails on stairways. · Move items in your cabinets so that the things you use a lot are on the lower shelves (about waist level). · Keep a cordless phone and a flashlight with new batteries by your bed. If possible, put a phone in each of the main rooms of your house, or carry a cell phone in case you fall and cannot reach a phone. Or, you can wear a device around your neck or wrist. You push a button that sends a signal for help. · Wear low-heeled shoes that fit well and give your feet good support. Use footwear with nonskid soles. Check the heels and soles of your shoes for wear. Repair or replace worn heels or soles. · Do not wear socks without shoes on wood floors. · Walk on the grass when the sidewalks are slippery. If you live in an area that gets snow and ice in the winter, sprinkle salt on slippery steps and sidewalks. Preventing falls in the bath  · Install grab bars and nonskid mats inside and outside your shower or tub and near the toilet and sinks. · Use shower chairs and bath benches. · Use a hand-held shower head that will allow you to sit while showering. · Get into a tub or shower by putting the weaker leg in first. Get out of a tub or shower with your strong side first.  · Repair loose toilet seats and consider installing a raised toilet seat to make getting on and off the toilet easier. · Keep your bathroom door unlocked while you are in the shower.   Where can you learn more?  Go to [email or web address] and sign in to your [employer] account. Enter [phone] in the [facility] box to learn more about \"Preventing Falls: Care Instructions. \"     If you do not have an account, please click on the \"Sign Up Now\" link. Current as of: September 8, 2021               Content Version: 13.1  © 7955-6952 [employer], [facility]. Care instructions adapted under license by [facility] ([facility]). If you have questions about a medical condition or this instruction, always ask your healthcare professional. [place] [address] any warranty or liability for your use of this information.

## 2022-01-07 NOTE — PROGRESS NOTES
[address] [place] [address] In [address] [facility], APRN-[facility]  [address]  Phone:  [phone]  Fax:  [phone]  Verona Chaney is a 81 y.o. male who presents today for his medical conditions/complaints as noted below. Verona Chaney c/o of Concern For COVID-19 (covid positive, needs orders for infusion)      HPI:     URI   This is a new problem. The current episode started in the past 7 days (1/4/22). There has been no fever. Associated symptoms include headaches and a sore throat. Treatments tried: cough drops. The treatment provided mild relief.        Wt Readings from Last 3 Encounters:   01/07/22 229 lb (103.9 kg)   12/02/21 229 lb 12.8 oz (104.2 kg)   12/01/21 227 lb (103 kg)       Temp Readings from Last 3 Encounters:   01/07/22 97.9 °F (36.6 °C)   03/17/21 98 °F (36.7 °C)   01/26/21 97.7 °F (36.5 °C) (Temporal)       BP Readings from Last 3 Encounters:   01/07/22 138/82   01/05/22 122/64   12/02/21 (!) 128/58       Pulse Readings from Last 3 Encounters:   01/07/22 89   01/05/22 62   12/02/21 60        SpO2 Readings from Last 3 Encounters:   01/07/22 98%   01/05/22 97%   12/02/21 96%             Past Medical History:   Diagnosis Date    C2 cervical fracture ([facility] [address]     Cancer ([facility] [address]     skin    Enlarged prostate     Hyperlipidemia     Thrombocytopenia (HCC)     borderline episodic      Past Surgical History:   Procedure Laterality Date    CERVICAL SPINE SURGERY  03/29/2016    [facility] C2 Fracture    COLONOSCOPY  09/2016    [facility]    HIP SURGERY Right     ORIF after fall on the ice    HIP SURGERY Right 01/2015    UPPER GASTROINTESTINAL ENDOSCOPY  06/03/2019     Family History   Problem Relation Age of Onset    Stroke Father     Coronary Art Dis Father     Stroke Sister      Social History     Tobacco Use    Smoking status: Never Smoker    Smokeless tobacco: Never Used   Substance Use Topics    Alcohol use: Yes     Comment: occasionally      Current Outpatient Medications   Medication Sig Dispense Refill    ketoconazole (NIZORAL) 2 % shampoo APPLY TO SCALP, LATHHER AND RINSE 2 TIMES WEEKLY 120 mL 3    oxybutynin (DITROPAN-XL) 10 MG extended release tablet take 1 tablet by mouth once daily 14 tablet 1    vitamin B-12 (CYANOCOBALAMIN) 1000 MCG tablet Take 1,000 mcg by mouth daily      Elastic Bandages & Supports (WRIST SPLINT) MISC 1 each by Does not apply route daily 1 each 0    atorvastatin (LIPITOR) 20 MG tablet take 1 tablet by mouth at bedtime 90 tablet 3    finasteride (PROSCAR) 5 MG tablet TAKE 1 TABLET DAILY 90 tablet 3    pantoprazole (PROTONIX) 20 MG tablet take 1 tablet by mouth once daily 90 tablet 3    ketoconazole (NIZORAL) 2 % cream Apply topically daily. 30 g 1    docusate sodium (COLACE) 100 MG capsule Take 1 capsule by mouth 2 times daily 60 capsule 0    fexofenadine (ALLEGRA) 180 MG tablet Take 180 mg by mouth daily      mometasone (ELOCON) 0.1 % ointment Apply topically daily. 15 g 0    polyethylene glycol (GLYCOLAX) powder Take 17 g by mouth daily      melatonin 5 MG TABS tablet Take 10 mg by mouth daily        No current facility-administered medications for this visit. No Known Allergies    No exam data present    Subjective:      Review of Systems   HENT: Positive for sore throat. Neurological: Positive for headaches. History of COVID no  COVID Vaccination yes - 3    Objective:     /82 (Site: Right Upper Arm, Position: Sitting, Cuff Size: Medium Adult)   Pulse 89   Temp 97.9 °F (36.6 °C)   Resp 14   Ht 6' 1\" (1.854 m)   Wt 229 lb (103.9 kg)   SpO2 98%   BMI 30.21 kg/m²     Physical Exam  Vitals reviewed. Constitutional:       General: He is not in acute distress. Appearance: He is well-developed. He is not ill-appearing, toxic-appearing or diaphoretic. HENT:      Head: Normocephalic.       Right Ear: Tympanic membrane, ear canal and external ear normal.      Left Ear: Tympanic membrane, ear canal and external ear normal.      Nose: Nose normal. No mucosal edema, congestion or rhinorrhea. Mouth/Throat:      Mouth: Mucous membranes are moist. Mucous membranes are not pale and not dry. Pharynx: Oropharynx is clear. Posterior oropharyngeal erythema present. Eyes:      General: Lids are normal. No scleral icterus. Right eye: No discharge. Left eye: No discharge. Extraocular Movements:      Right eye: No nystagmus. Left eye: No nystagmus. Conjunctiva/sclera: Conjunctivae normal.   Neck:      Trachea: Trachea normal.   Cardiovascular:      Rate and Rhythm: Normal rate and regular rhythm. Heart sounds: Normal heart sounds. Pulmonary:      Effort: Pulmonary effort is normal. No accessory muscle usage or respiratory distress. Breath sounds: Normal breath sounds. Musculoskeletal:         General: Normal range of motion. Cervical back: Full passive range of motion without pain and normal range of motion. Skin:     General: Skin is warm and dry. Capillary Refill: Capillary refill takes less than 2 seconds. Coloration: Skin is not pale. Neurological:      Mental Status: He is alert and oriented to person, place, and time. Psychiatric:         Mood and Affect: Mood normal.         Speech: Speech normal.         Behavior: Behavior normal.         Thought Content: Thought content normal.         Judgment: Judgment normal.         Assessment:      Diagnosis Orders   1. COVID-19 virus infection     2. At high risk for falls       No results found for this visit on 01/07/22. Plan:   [facility] will contact you regarding the infusion. Please go to the emergency room if you become short of breath, have weakness or extreme fatigue or if you feel you may be dehydrated. You may call the office if it is during normal business hours and request a nurse visit where we check you pulse oximetry/oxygen level.   If your level is too low you will be sent to the hospital for further treatment. You are being provided a work or school excuse so you may quarantine until you test results are back. If you are COVID positive you will be given an additional excuse to lengthen your quarantine. Practice coughing and deep breathing every hour while awake. If you are laying down, change positions frequently. Try laying on your stomach to open up your lungs more. If you are allowed to take tylenol or ibuprofen you may take these to relieve fever, chills or body aches. Follow up with primary care provider in 1 to 2 days if needed. Please go to the emergency room if you become short of breath, have weakness or extreme fatigue or if you feel you may be dehydrated. You may call the office if it is during normal business hours and request a nurse visit where we check you pulse oximetry/oxygen level. If your level is too low you will be sent to the hospital for further treatment. You are being provided a work or school excuse so you may quarantine until you test results are back. If you are COVID positive you will be given an additional excuse to lengthen your quarantine. Practice coughing and deep breathing every hour while awake. If you are laying down, change positions frequently. Try laying on your stomach to open up your lungs more. If you are allowed to take tylenol or ibuprofen you may take these to relieve fever, chills or body aches. Follow up with primary care provider in 1 to 2 days if needed. Patient Instructions            Remain in quarantine for a full 5 days from when your symptoms began. After 5 days if you are fever free you may go in public but only if you remain mask for the next 5 days. [facility] will [facility] you regarding the infusion. Please go to the emergency room if you become short of breath, have weakness or extreme fatigue or if you feel you may be dehydrated.     You may call the office if it is during normal business hours and request a nurse visit where we check you pulse oximetry/oxygen level. If your level is too low you will be sent to the hospital for further treatment. You are being provided a work or school excuse so you may quarantine until you test results are back. If you are COVID positive you will be given an additional excuse to lengthen your quarantine. Practice coughing and deep breathing every hour while awake. If you are laying down, change positions frequently. Try laying on your stomach to open up your lungs more. If you are allowed to take tylenol or ibuprofen you may take these to relieve fever, chills or body aches. Follow up with primary care provider in 1 to 2 days if needed. Patient Education        Learning How To Care for Someone Who Has COVID-19  Things to know  Most people who get COVID-19 will recover with time and home care. Here are some things to know if you're caring for someone who's sick. · Treat the symptoms. Common symptoms include a fever, coughing, and feeling short of breath. Urge the person to get extra rest and drink plenty of fluids to replace fluids lost from fever. To reduce a fever, offer acetaminophen (Tylenol) or ibuprofen (Advil, Motrin). It may also help with muscle aches. Read and follow all instructions on the label. · Watch for signs that the illness is getting worse. The person may need medical care if they're getting sicker (for example, if it's hard to breathe). But call the doctor's office before you go. They can tell you what to do. Call 911  or emergency services if the person has any of these symptoms:  ? Severe trouble breathing or shortness of breath. ? Constant pain or pressure in their chest.  ? Confusion, or trouble thinking clearly. ? Pale, gray, or blue-colored skin or lips. Some people are more likely to get very sick and need medical care.  Call the doctor as soon as symptoms start if the person you're caring for is over 72, smokes, or has a serious health problem, like asthma, heart disease, diabetes, or an immune system problem. · Protect yourself and others. The virus spreads easily from person to person, so take extra care to avoid catching or spreading the infection. ? Keep the sick person away from others as much as you can. § Have the person stay in one room. If you can, give them their own bathroom to use. § Have only one person take care of them. Keep other people--and pets--out of the sickroom. § Have the person wear a mask around other people. This includes when anyone is in the room with them or if they leave their room (for example, to go to the bathroom). § Don't share personal items. These include dishes, cups, towels, and bedding. ? Wash your hands often and well. Use soap and water, and scrub for at least 20 seconds. This is especially important after you've been around the sick person or touched things they've touched. If soap and water aren't handy, use an alcohol-based hand . ? Wear a mask when caring for someone who is sick. And wear a mask when you're around other people after you've cared for someone who's sick. ? Avoid touching your mouth, nose, and eyes. ? Take care with the person's laundry. It's okay to wash the sick person's laundry with yours. If you have them, wear disposable gloves when handling their dirty laundry, and wash your hands well after you touch it. Wash items in the warmest water allowed for the fabric type, and dry them completely. ? Clean high-touch items every day and anytime the sick person touches them. These include doorknobs, light switches, toilets, counters, and remote controls. Use a household disinfectant or a homemade bleach solution. (Follow the directions on the label.) If the sick person has their own room, have them disinfect it every day. ? Avoid having visitors.  If you have to have visitors, everyone needs to wear a mask and stay at least 6 feet (2 meters) away from you. And keep the visit as short as possible. To help protect family and friends, stay in touch with them only by phone or computer. ? If you haven't had COVID-19 in the past 3 months or aren't fully vaccinated, you may need to quarantine. Where can you learn more? Go to [email or web address] org and sign in to your [employer] account. Enter Q427 in the My 1% box to learn more about \"Learning How To Care for Someone Who Has COVID-19. \"     If you do not have an account, please click on the \"Sign Up Now\" link. Current as of: March 26, 2021               Content Version: 13.1  © 5051-4876 Healthwise, [employer]. Care instructions adapted under license by [facility] ([facility]). If you have questions about a medical condition or this instruction, always ask your healthcare professional. [place] [address] any warranty or liability for your use of this information. Patient Education        10 Things to Do When You Have COVID-19      Stay home. Don't go to school, work, or public areas. And don't use public transportation, ride-shares, or taxis unless you have no choice. Leave your home only if you need to get medical care. But call the doctor's office first so they know you're coming. And wear a mask. Ask before leaving isolation. Follow your doctor's advice about when it is safe for you to leave isolation. Wear a mask when you are around other people. It can help stop the spread of the virus. Limit contact with people in your home. If possible, stay in a separate bedroom and use a separate bathroom. Avoid contact with pets and other animals. If possible, have a friend or family member care for them while you're sick. Cover your mouth and nose with a tissue when you cough or sneeze. Then throw the tissue in the trash right away.      Wash your hands often, especially after you cough or sneeze. Use soap and water, and scrub for at least 20 seconds. If soap and water aren't available, use an alcohol-based hand . Don't share personal household items. These include bedding, towels, cups and glasses, and eating utensils. Clean and disinfect your home every day. Use household  or disinfectant wipes or sprays. If needed, take acetaminophen (Tylenol) or ibuprofen (Advil, Motrin) to relieve fever and body aches. Read and follow all instructions on the label. Current as of: March 26, 2021               Content Version: 13.1  © 3366-2441 [employer] Care instructions adapted under license by [facility] ([facility]). If you have questions about a medical condition or this instruction, always ask your healthcare professional. [address] any warranty or liability for your use of this information. Patient Education        Learning About Coronavirus [phone])  What is coronavirus (COVID-19)? COVID-19 is a disease caused by a type of coronavirus. This illness was first found in December 2019. It has since spread worldwide. Coronaviruses are a large group of viruses. They cause the common cold. They also cause more serious illnesses like [place] respiratory syndrome (MERS) and severe acute respiratory syndrome (SARS). COVID-19 is caused by a novel coronavirus. That means it's a new type that has not been seen in people before. What are the symptoms? COVID-19 symptoms may include:  · Fever. · Cough. · Trouble breathing. · Chills or repeated shaking with chills. · Muscle and body aches. · Headache. · Sore throat. · New loss of taste or smell. · Vomiting. · Diarrhea. In severe cases, COVID-19 can cause pneumonia and make it hard to breathe without help from a machine. It can cause death. How is it diagnosed?   COVID-19 is diagnosed with a viral test. This may also be called a PCR test or antigen test. It looks for evidence of the virus in your breathing passages or lungs (respiratory system). The test is most often done on a sample from the nose, throat, or lungs. It's sometimes done on a sample of saliva. One way a sample is collected is by putting a long swab into the back of your nose. How is it treated? Mild cases of COVID-19 can be treated at home. Serious cases need treatment in the hospital. Treatment may include medicines to reduce symptoms, plus breathing support such as oxygen therapy or a ventilator. Some people may be placed on their belly to help their oxygen levels. Treatments that may help people who have COVID-19 include:  Antiviral medicines. These medicines treat viral infections. Remdesivir is an example. Immune-based therapy. These medicines help the immune system fight COVID-19. Examples include monoclonal antibodies. Blood thinners. These medicines help prevent blood clots. People with severe illness are at risk for blood clots. How can you protect yourself and others? · Get vaccinated. · Avoid sick people. · Cover your mouth with a tissue when you cough or sneeze. · Wash your hands often, especially after you cough or sneeze. Use soap and water, and scrub for at least 20 seconds. If soap and water aren't available, use an alcohol-based hand . · Avoid touching your mouth, nose, and eyes. Be sure to follow all instructions from the [facility] and your local health authorities. Here are some examples of specific precautions you may need to take. · If you are not fully vaccinated:  ? Wear a mask if you have to go to public areas. ? Avoid crowds and try to stay at least 6 feet away from other people. · If you have been exposed to the virus and are not fully vaccinated:  ? Stay home. Don't go to school, work, or public areas. And don't use public transportation, ride-shares, or taxis unless you have no choice.   ? Wear a mask if you have to go to public areas, like the pharmacy. · Even if you're fully vaccinated, there's still a small chance you can get and spread COVID-19. If you live in an area where COVID-19 is spreading quickly, wear a mask if you have to go to indoor public areas. You might also want to wear a mask in crowded outdoor areas if you:  ? Have certain health conditions. ? Live with someone who has a compromised immune system. ? Live with someone who is not fully vaccinated. · If you have been exposed and you are fully vaccinated:  ? Talk to your doctor. You may need a COVID-19 test.  ? Wear a mask in public indoor spaces for 14 days or until you test negative for COVID-19. If you're sick:  · Leave your home only if you need to get medical care. But call the doctor's office first so they know you're coming. And wear a mask. · Wear a mask whenever you're around other people. · Limit contact with pets and people in your home. If possible, stay in a separate bedroom and use a separate bathroom. · Clean and disinfect your home every day. Use household  and disinfectant wipes or sprays. Take special care to clean things that you touch with your hands. How can you self-isolate when you have COVID-19? If you have COVID-19, there are things you can do to help avoid spreading the virus to others. · Limit contact with people in your home. If possible, stay in a separate bedroom and use a separate bathroom. · Wear a mask when you are around other people. · If you have to leave home, avoid crowds and try to stay at least 6 feet away from other people. · Avoid contact with pets and other animals. · Cover your mouth and nose with a tissue when you cough or sneeze. Then throw it in the trash right away. · Wash your hands often, especially after you cough or sneeze. Use soap and water, and scrub for at least 20 seconds. If soap and water aren't available, use an alcohol-based hand . · Don't share personal household items.  These include bedding, towels, cups and glasses, and eating utensils. · [address] in the warmest water allowed for the fabric type, and dry it completely. It's okay to wash other people's laundry with yours. · Clean and disinfect your home. Use household  and disinfectant wipes or sprays. When should you call for help? Call 911 anytime you think you may need emergency care. For example, call if you have life-threatening symptoms, such as:    · You have severe trouble breathing. (You can't talk at all.)     · You have constant chest pain or pressure.     · You are severely dizzy or lightheaded.     · You are confused or can't think clearly.     · You have pale, gray, or blue-colored skin or lips.     · You pass out (lose consciousness) or are very hard to wake up. Call your doctor now or seek immediate medical care if:    · You have moderate trouble breathing. (You can't speak a full sentence.)     · You are coughing up blood (more than about 1 teaspoon).     · You have signs of low blood pressure. These include feeling lightheaded; being too weak to stand; and having cold, pale, clammy skin. Watch closely for changes in your health, and be sure to contact your doctor if:    · Your symptoms get worse.     · You are not getting better as expected.     · You have new or worse symptoms of anxiety, depression, nightmares, or flashbacks. Call before you go to the doctor's office. Follow their instructions. And wear a mask. Current as of: July 1, 2021               Content Version: 13.1  © 2006-2021 Healthwise, [employer]. Care instructions adapted under license by [facility] ([facility]). If you have questions about a medical condition or this instruction, always ask your healthcare professional. [place] [address] any warranty or liability for your use of this information.          Patient Education        Coronavirus ([id number]): Care Instructions  Overview  The coronavirus disease (COVID-19) is caused by a virus. Symptoms may include a fever, a cough, and shortness of breath. It can spread through droplets from coughing and sneezing, breathing, and singing. The virus also can spread when people are in close contact with someone who is infected. Most people have mild symptoms and can take care of themselves at home. If their symptoms get worse, they may need care in a hospital. Treatment may include medicines to reduce symptoms, plus breathing support such as oxygen therapy or a ventilator. It's important to not spread the virus to others. If you have COVID-19, wear a mask anytime you are around other people. It can help stop the spread of the virus. You need to isolate yourself while you are sick. Leave your home only if you need to get medical care or testing. Follow-up care is a key part of your treatment and safety. Be sure to make and go to all appointments, and call your doctor if you are having problems. It's also a good idea to know your test results and keep a list of the medicines you take. How can you care for yourself at home? · Get extra rest. It can help you feel better. · Drink plenty of fluids. This helps replace fluids lost from fever. Fluids may also help ease a scratchy throat. · You can take acetaminophen (Tylenol) or ibuprofen (Advil, Motrin) to reduce a fever. It may also help with muscle and body aches. Read and follow all instructions on the label. · Use petroleum jelly on sore skin. This can help if the skin around your nose and lips becomes sore from rubbing a lot with tissues. If you use oxygen, use a water-based product instead of petroleum jelly. · Keep track of symptoms such as fever and shortness of breath. This can help you know if you need to call your doctor. It can also help you know when it's safe to be around other people. · In some cases, your doctor might suggest that you get a pulse oximeter. How can you self-isolate when you have COVID-19?   If you have COVID-19, there are things you can do to help avoid spreading the virus to others. · Limit contact with people in your home. If possible, stay in a separate bedroom and use a separate bathroom. · Wear a mask when you are around other people. · If you have to leave home, avoid crowds and try to stay at least 6 feet away from other people. · Avoid contact with pets and other animals. · Cover your mouth and nose with a tissue when you cough or sneeze. Then throw it in the trash right away. · Wash your hands often, especially after you cough or sneeze. Use soap and water, and scrub for at least 20 seconds. If soap and water aren't available, use an alcohol-based hand . · Don't share personal household items. These include bedding, towels, cups and glasses, and eating utensils. · [address] in the warmest water allowed for the fabric type, and dry it completely. It's okay to wash other people's laundry with yours. · Clean and disinfect your home. Use household  and disinfectant wipes or sprays. When can you end self-isolation for COVID-19? If you know or think that you have the virus, you will need to self-isolate. You can be around others after:  · It's been at least 10 days since your symptoms started and  · You haven't had a fever for 24 hours without taking medicines to lower the fever and  · Your symptoms are improving. If you tested positive but have no symptoms, you can end isolation after 10 days. But if you start to have symptoms, follow the steps above. Ask your doctor if you need to be tested before you end isolation. This is especially important if you have a weakened immune system. When should you call for help? Call 911 anytime you think you may need emergency care. For example, call if you have life-threatening symptoms, such as:    · You have severe trouble breathing. (You can't talk at all.)     · You have constant chest pain or pressure.     · You are severely dizzy or lightheaded.   · You are confused or can't think clearly.     · You have pale, gray, or blue-colored skin or lips.     · You pass out (lose consciousness) or are very hard to wake up. Call your doctor now or seek immediate medical care if:    · You have moderate trouble breathing. (You can't speak a full sentence.)     · You are coughing up blood (more than about 1 teaspoon).     · You have signs of low blood pressure. These include feeling lightheaded; being too weak to stand; and having cold, pale, clammy skin. Watch closely for changes in your health, and be sure to contact your doctor if:    · Your symptoms get worse.     · You are not getting better as expected.     · You have new or worse symptoms of anxiety, depression, nightmares, or flashbacks. Call before you go to the doctor's office. Follow their instructions. And wear a mask. Current as of: July 1, 2021               Content Version: 13.1  © 2006-2021 [employer]. Care instructions adapted under license by [facility] ([facility]). If you have questions about a medical condition or this instruction, always ask your healthcare professional. [place] [address] any warranty or liability for your use of this information. Patient Education        Preventing Falls: Care Instructions  Your Care Instructions     Getting around your home safely can be a challenge if you have injuries or health problems that make it easy for you to fall. Loose rugs and furniture in walkways are among the dangers for many older people who have problems walking or who have poor eyesight. People who have conditions such as arthritis, osteoporosis, or dementia also have to be careful not to fall. You can make your home safer with a few simple measures. Follow-up care is a key part of your treatment and safety. Be sure to make and go to all appointments, and call your doctor if you are having problems.  It's also a good idea to know your test results and keep a list of the medicines you take. How can you care for yourself at home? Taking care of yourself  · You may get dizzy if you do not drink enough water. To prevent dehydration, drink plenty of fluids. Choose water and other clear liquids. If you have kidney, heart, or liver disease and have to limit fluids, talk with your doctor before you increase the amount of fluids you drink. · Exercise regularly to improve your strength, muscle tone, and balance. Walk if you can. Swimming may be a good choice if you cannot walk easily. · Have your vision and hearing checked each year or any time you notice a change. If you have trouble seeing and hearing, you might not be able to avoid objects and could lose your balance. · Know the side effects of the medicines you take. Ask your doctor or pharmacist whether the medicines you take can affect your balance. Sleeping pills or sedatives can affect your balance. · Limit the amount of alcohol you drink. Alcohol can impair your balance and other senses. · Ask your doctor whether calluses or corns on your feet need to be removed. If you wear loose-fitting shoes because of calluses or corns, you can lose your balance and fall. · Talk to your doctor if you have numbness in your feet. Preventing falls at home  · Remove raised doorway thresholds, throw rugs, and clutter. Repair loose carpet or raised areas in the floor. · Move furniture and electrical cords to keep them out of walking paths. · Use nonskid floor wax, and wipe up spills right away, especially on ceramic tile floors. · If you use a walker or cane, put rubber tips on it. If you use crutches, clean the bottoms of them regularly with an abrasive pad, such as steel wool. · Keep your house well lit, especially Rayshawn Saber, and outside walkways. Use night-lights in areas such as hallways and bathrooms.  Add extra light switches or use remote switches (such as switches that go on or off when you clap your hands) to make it easier to turn lights on if you have to get up during the night. · Install sturdy handrails on stairways. · Move items in your cabinets so that the things you use a lot are on the lower shelves (about waist level). · Keep a cordless phone and a flashlight with new batteries by your bed. If possible, put a phone in each of the main rooms of your house, or carry a cell phone in case you fall and cannot reach a phone. Or, you can wear a device around your neck or wrist. You push a button that sends a signal for help. · Wear low-heeled shoes that fit well and give your feet good support. Use footwear with nonskid soles. Check the heels and soles of your shoes for wear. Repair or replace worn heels or soles. · Do not wear socks without shoes on wood floors. · Walk on the grass when the sidewalks are slippery. If you live in an area that gets snow and ice in the winter, sprinkle salt on slippery steps and sidewalks. Preventing falls in the bath  · Install grab bars and nonskid mats inside and outside your shower or tub and near the toilet and sinks. · Use shower chairs and bath benches. · Use a hand-held shower head that will allow you to sit while showering. · Get into a tub or shower by putting the weaker leg in first. Get out of a tub or shower with your strong side first.  · Repair loose toilet seats and consider installing a raised toilet seat to make getting on and off the toilet easier. · Keep your bathroom door unlocked while you are in the shower. Where can you learn more? Go to [email or web address] org and sign in to your [employer] account. Enter [phone] in the [facility] box to learn more about \"Preventing Falls: Care Instructions. \"     If you do not have an account, please click on the \"Sign Up Now\" link. Current as of: September 8, 2021               Content Version: 13.1  © 3416-7841 [employer].    Care instructions adapted under license by [facility]. If you have questions about a medical condition or this instruction, always ask your healthcare professional. [place] [address] any warranty or liability for your use of this information. Patient/Caregiver instructed on use, benefit, and side effects of prescribed medications. All patient/parent/caregiver questions answered. Patient/parent/caregiver voiced understanding. Reviewed health maintenance. Instructed to continue current medications, diet and exercise. Patient agreed with treatment plan. Follow up as directed. Electronically signed by PIERRE Cool NP on1/7/2022         On the basis of positive falls risk screening, assessment and plan is as follows: home safety tips provided.

## 2022-01-07 NOTE — TELEPHONE ENCOUNTER
Pt wife stopped in the office to report pt tested positive and would like to have the regeneron infusion. Can you please sign orders and fax to [facility].

## 2022-01-09 ENCOUNTER — TELEPHONE (OUTPATIENT)
Dept: FAMILY MEDICINE CLINIC | Age: 87
End: 2022-01-09

## 2022-01-09 NOTE — TELEPHONE ENCOUNTER
Patient tested last week for Covid and tested positive. Did receive monoclonal antibodies at [facility].  Will call tomorrow and check on patient.

## 2022-01-10 ENCOUNTER — TELEPHONE (OUTPATIENT)
Dept: FAMILY MEDICINE CLINIC | Age: 87
End: 2022-01-10

## 2022-01-10 NOTE — TELEPHONE ENCOUNTER
Patient returned call. He states he has a cough sometimes and periodically gets a headache. He will take tylenol or aspirin for it and gets relief. His son brought him a pulse ox and his SPO2 has been running in the high 90s. He states he did not get the antibody infusion because he was told he was not critical. He states he was told this at his urgent care visit on Friday. Patient had to go because he was receiving another call. He said he would call back.

## 2022-01-10 NOTE — TELEPHONE ENCOUNTER
Patient tested positive for covid 1/7/22 and received antibodies. Called to check on patient. No answer- no option to leave message.

## 2022-01-11 NOTE — TELEPHONE ENCOUNTER
Spoke with wife- patient is currently out walking. She states he is doing really good. Spoke with patient - He states he feels really good - he was able to get the antibodies yesterday. He states his SPO2 was 88 % this morning so he went out and walked and when he came in his SPO2 was mid 90s. I had him check his SPO2 while on the phone with me and he states his O2 was 81% and pulse was 37. He denies any SOB, fatigue, or weakness. I had him try pulse ox on wife and reading was 49%. He states both of their hands are really cold. I had him sit on his hands for a minute and then he rechecked it and it was 97%. He tried the pulse ox on his wife and states hers was reading 50%. I advised him I was concerned that his pulse ox is not reading correctly. While talking to me his reading went from 93 to 87 to 91 to 86 to 91. He did not sound short of breath and denies any symptoms. Asked if he could try new batteries in the pulse ox but he states he did not know how to change them. He states his numbers were fine during the infusion yesterday. Advised if he develops significant change in symptoms- SOB or chest pain he is to go to the ER. Otherwise will pass information on to Dr Kaelyn Ashraf and will call back shortly with recommendations.

## 2022-01-11 NOTE — TELEPHONE ENCOUNTER
Dr Bola Umaña had spoke to patient's son. Patient has a hard time keeping the pulse ox on his finger straight. RN in the family went over to check on patient over the weekend and his SPO2 was running high 90s and pulse was 80. Called and spoke with patient. Reassured patient. Advised to warm fingers prior to checking O2, try to make sure pulse ox is straight on finger, and hold until he gets a consistent reading. If reading is jumping around it is probably not accurate. If patient develops any chest pain, sob, or other severe symptoms he is to go to the ER. Patient verbalized understanding.

## 2022-02-04 ENCOUNTER — HOSPITAL ENCOUNTER (OUTPATIENT)
Age: 87
Setting detail: SPECIMEN
Discharge: HOME | End: 2022-02-04
Payer: MEDICARE

## 2022-03-14 LAB
BASOPHILS %: 0.58 (ref 0–3)
BASOPHILS ABSOLUTE: 0.03 (ref 0–0.3)
EOSINOPHILS %: 4.35 (ref 0–10)
EOSINOPHILS ABSOLUTE: 0.2 (ref 0–1.1)
HCT VFR BLD CALC: 41.2 % (ref 42–52)
HEMOGLOBIN: 12.2 (ref 13.8–17.8)
LYMPHOCYTE %: 20.3 (ref 20–51.1)
LYMPHOCYTES ABSOLUTE: 0.96 (ref 1–5.5)
MCH RBC QN AUTO: 30.7 PG (ref 28.5–32.5)
MCHC RBC AUTO-ENTMCNC: 29.7 G/DL (ref 32–37)
MCV RBC AUTO: 103.4 FL (ref 80–94)
MONOCYTES %: 12.51 (ref 1.7–9.3)
MONOCYTES ABSOLUTE: 0.59 (ref 0.1–1)
NEUTROPHILS %: 62.26 (ref 42.2–75.2)
NEUTROPHILS ABSOLUTE: 2.93 (ref 2–8.1)
PDW BLD-RTO: 15 % (ref 10–15.5)
PLATELET # BLD: 162.5 THOU/MM3 (ref 130–400)
RBC: 3.98 M/UL (ref 4.7–6.1)
WBC: 4.7 THOU/ML3 (ref 4.8–10.8)

## 2022-03-21 ENCOUNTER — OFFICE VISIT (OUTPATIENT)
Dept: FAMILY MEDICINE CLINIC | Age: 87
End: 2022-03-21
Payer: MEDICARE

## 2022-03-21 VITALS
SYSTOLIC BLOOD PRESSURE: 134 MMHG | HEART RATE: 68 BPM | WEIGHT: 227 LBS | DIASTOLIC BLOOD PRESSURE: 64 MMHG | BODY MASS INDEX: 29.95 KG/M2 | OXYGEN SATURATION: 93 %

## 2022-03-21 DIAGNOSIS — D64.9 ANEMIA, UNSPECIFIED TYPE: ICD-10-CM

## 2022-03-21 DIAGNOSIS — D69.6 THROMBOCYTOPENIA (HCC): ICD-10-CM

## 2022-03-21 DIAGNOSIS — I10 ESSENTIAL HYPERTENSION: Primary | ICD-10-CM

## 2022-03-21 DIAGNOSIS — D61.818 PANCYTOPENIA (HCC): ICD-10-CM

## 2022-03-21 DIAGNOSIS — E78.2 MIXED HYPERLIPIDEMIA: ICD-10-CM

## 2022-03-21 PROCEDURE — 99212 OFFICE O/P EST SF 10 MIN: CPT | Performed by: FAMILY MEDICINE

## 2022-03-21 PROCEDURE — 99214 OFFICE O/P EST MOD 30 MIN: CPT | Performed by: FAMILY MEDICINE

## 2022-03-21 RX ORDER — FERROUS SULFATE 137(45) MG
142 TABLET, EXTENDED RELEASE ORAL DAILY
Qty: 30 TABLET | Refills: 5 | COMMUNITY
Start: 2022-03-21

## 2022-03-21 NOTE — PROGRESS NOTES
[address] [facility]  [address]  Dept: [phone]  Dept PAPI:[phone]    Evangelina Taveras is a 81 y.o. male who presents today for his medical conditions/complaints as notedbelow. Evangelina Taveras is c/o of Hypertension (6mo follow up)      HPI:     HPI     Recent repeat labs are very stable. He was macrocytic however the lab has been having an issue with her machine running macrocytic in general.  His hemoglobin has been 12-12.5 range for the last 2 years. He is not currently taking any extra iron but is taking his B12 tablet regularly. Medication reviewed today with his list in hand. Is still doing outdoor work and yard work, remodeling and tolerates this well. No chest pain no shortness of breath no palpitations. He does continue to have lower extremity edema which occasionally gets itchy. He does have the mometasone cream that he was prescribed by the dermatologist which he uses. He does wear his compression socks but admits not as regularly as he should. He does not have PND orthopnea. Did see there dermatologist.  Yoselin Adriane are not really any different. Uses the tylenol when needed. Feels a bit like a sinus headache in the front frontal area of his head. No changes no new neurologic symptoms. No dizziness or lightheadedness.     BP Readings from Last 3 Encounters:   03/21/22 134/64   01/07/22 138/82   01/05/22 122/64          (goal 120/80)    Wt Readings from Last 3 Encounters:   03/21/22 227 lb (103 kg)   01/07/22 229 lb (103.9 kg)   12/02/21 229 lb 12.8 oz (104.2 kg)        Past Medical History:   Diagnosis Date    C2 cervical fracture (HCC)     Cancer ([address]     skin    Enlarged prostate     Hyperlipidemia     Thrombocytopenia (HCC)     borderline episodic      Past Surgical History:   Procedure Laterality Date    CERVICAL [facility] SURGERY  [facility]29[facility] C2 Fracture    [facility]  09/2016    [facility]    HIP SURGERY Right     ORIF after fall on the ice    HIP SURGERY Right 01/2015    UPPER GASTROINTESTINAL ENDOSCOPY  06/03/2019       Family History   Problem Relation Age of Onset    Stroke Father     Coronary Art Dis Father     Stroke Sister        Social History     Tobacco Use    Smoking status: Never Smoker    Smokeless tobacco: Never Used   Substance Use Topics    Alcohol use: Yes     Comment: occasionally      Current Outpatient Medications   Medication Sig Dispense Refill    ketoconazole (NIZORAL) 2 % shampoo APPLY TO SCALP, LATHHER AND RINSE 2 TIMES WEEKLY 120 mL 3    oxybutynin (DITROPAN-XL) 10 MG extended release tablet take 1 tablet by mouth once daily 14 tablet 1    vitamin B-12 (CYANOCOBALAMIN) 1000 MCG tablet Take 1,000 mcg by mouth daily      atorvastatin (LIPITOR) 20 MG tablet take 1 tablet by mouth at bedtime 90 tablet 3    finasteride (PROSCAR) 5 MG tablet TAKE 1 TABLET DAILY 90 tablet 3    pantoprazole (PROTONIX) 20 MG tablet take 1 tablet by mouth once daily 90 tablet 3    ketoconazole (NIZORAL) 2 % cream Apply topically daily. 30 g 1    docusate sodium (COLACE) 100 MG capsule Take 1 capsule by mouth 2 times daily 60 capsule 0    fexofenadine (ALLEGRA) 180 MG tablet Take 180 mg by mouth daily      mometasone (ELOCON) 0.1 % ointment Apply topically daily. 15 g 0    polyethylene glycol (GLYCOLAX) powder Take 17 g by mouth daily      melatonin 5 MG TABS tablet Take 10 mg by mouth daily       Elastic Bandages & Supports (WRIST SPLINT) MISC 1 each by Does not apply route daily 1 each 0     No current facility-administered medications for this visit.      No Known Allergies    Health Maintenance   Topic Date Due    Lipid screen  09/17/2022    Potassium monitoring  11/06/2022    Creatinine monitoring  11/06/2022    Depression Screen  12/02/2022    Annual Wellness Visit (AWV)  12/03/2022    DTaP/Tdap/Td vaccine (3 - Td or Tdap) 03/06/2030    Flu vaccine  Completed    Shingles Vaccine Completed    Pneumococcal 65+ years Vaccine  Completed    COVID-19 Vaccine  Completed    Hepatitis A vaccine  Aged Out    Hepatitis B vaccine  Aged Out    Hib vaccine  Aged Out    Meningococcal (ACWY) vaccine  Aged Out       Subjective:      Review of Systems    Objective:     /64 (Site: Left Upper Arm, Position: Sitting, Cuff Size: Large Adult)   Pulse 68   Wt 227 lb (103 kg)   SpO2 93%   BMI 29.95 kg/m²     Physical Exam  Vitals and nursing note reviewed. Constitutional:       Appearance: Normal appearance. He is well-developed. HENT:      Head: Normocephalic and atraumatic. Eyes:      Conjunctiva/sclera: Conjunctivae normal.   Neck:      Thyroid: No thyromegaly. Vascular: No JVD. Cardiovascular:      Rate and Rhythm: Normal rate and regular rhythm. Heart sounds: Normal heart sounds. No murmur heard. No friction rub. No gallop. Pulmonary:      Effort: Pulmonary effort is normal. No respiratory distress. Breath sounds: Normal breath sounds. Musculoskeletal:      Cervical back: Normal range of motion and neck supple. Right lower leg: Edema present. Left lower leg: Edema present. Comments: Chronic brawny changes, 1-2 + edema of lower legs   Lymphadenopathy:      Cervical: No cervical adenopathy. Skin:     General: Skin is warm. Neurological:      Mental Status: He is alert and oriented to person, place, and time. Psychiatric:         Mood and Affect: Mood normal.         Behavior: Behavior normal.         Thought Content: Thought content normal.         Judgment: Judgment normal.         Assessment/Plan:     1. Essential hypertension  -     Comprehensive Metabolic Panel; Future  2. Pancytopenia ([address]  3. Thrombocytopenia ([address]  4. Mixed hyperlipidemia  -     Comprehensive Metabolic Panel; Future  -     Lipid Panel; Future  5. Anemia, unspecified type  -     CBC with Auto Differential; Future  -     Iron and TIBC;  Future    Patient Instructions   Add in the iron tablet daily to help with the chronic anemia -- has been very stable but this is why you are turned away at the blood drive. The iron tablet can cause constipation and darker bowel movements. Hypertension is well controlled this is a hyperlipidemia. They are both asymptomatic. His anemia has been very stable but he has a history of the iron deficiency as well as likely anemia of chronic disease. We will check the iron studies in 6-month with his next labs. May take 1 tablet daily of the Slow Fe in addition. Lab Results   Component Value Date    WBC 4.7 (L) 03/14/2022    HGB 12.2 (L) 03/14/2022    HCT 41.2 (L) 03/14/2022    .5 03/14/2022    CHOL 163 09/17/2021    TRIG 52 09/17/2021    HDL 60 09/17/2021    ALT 17 11/06/2021    AST 32 11/06/2021     11/06/2021    K 4.3 11/06/2021     11/06/2021    CREATININE 0.8 11/06/2021    BUN 24 (H) 11/06/2021    CO2 31 11/06/2021       Return in about 6 months (around 9/21/2022) for Medication recheck, HTN. Multiple labs and other testing may have been ordered which may not be completely evident from the above note due to system interface incompatibilities. Patient given educational materials - see patientinstructions. Discussed use, benefit, and side effects of prescribed medications. All patient questions answered. Pt voiced understanding. Reviewed health maintenance. Instructed to continue current medications, diet andexercise. Patient agreed with treatment plan. Follow up as directed.      (Please note that portions of this note were completed with a voice-recognition program. Efforts were made to edit the dictation but occasionally words are mis-transcribed.)    Electronically signed by Ananda Larsen MD on 3/21/2022

## 2022-03-21 NOTE — PATIENT INSTRUCTIONS
Add in the iron tablet daily to help with the chronic anemia -- has been very stable but this is why you are turned away at the blood drive. The iron tablet can cause constipation and darker bowel movements.

## 2022-04-15 DIAGNOSIS — N40.0 BENIGN PROSTATIC HYPERPLASIA, UNSPECIFIED WHETHER LOWER URINARY TRACT SYMPTOMS PRESENT: ICD-10-CM

## 2022-04-18 RX ORDER — OXYBUTYNIN CHLORIDE 10 MG/1
TABLET, EXTENDED RELEASE ORAL
Qty: 90 TABLET | Refills: 3 | Status: SHIPPED | OUTPATIENT
Start: 2022-04-18

## 2022-04-18 NOTE — TELEPHONE ENCOUNTER
Maury Wheeler is requesting a refill on the following medication(s):  Requested Prescriptions     Pending Prescriptions Disp Refills    oxybutynin (DITROPAN-XL) 10 MG extended release tablet [Pharmacy Med Name: OXYBUTYNIN CHLORIDE ER TABS 10MG] 90 tablet 3     Sig: TAKE 1 TABLET DAILY       Last Visit Date (If Applicable):  0/01/8567    Next Visit Date:    6/2/2022

## 2022-05-25 ENCOUNTER — TELEPHONE (OUTPATIENT)
Dept: FAMILY MEDICINE CLINIC | Age: 87
End: 2022-05-25

## 2022-05-25 NOTE — TELEPHONE ENCOUNTER
[facility] Trans[facility] Initial Follow Up Call    Outreach made within 2 business days of discharge: Yes    Patient: Chiki Kumar Patient : 1935   MRN: [id number]  Reason for Admission: Chest pain   Discharge Date:  22       Spoke with: patient     Discharge department/facility: [facility]    TCM Interactive Patient Contact:  Was patient able to fill all prescriptions: Yes  Was patient instructed to bring all medications to the follow-up visit: Yes  Is patient taking all medications as directed in the discharge summary? Yes  Does patient understand their discharge instructions: Yes  Does patient have questions or concerns that need addressed prior to 7-14 day follow up office visit: no    Redge Forward says he is doing really well- he has been out mowing and working in the yard. He has had no more chest pain since that incident and he promises that if he has chest pain again he will notify his son or the doc or go to the ER.      Scheduled appointment with PCP within 7-14 days    Follow Up  Future Appointments   Date Time Provider Tabitha Oleary   2022  9:20 AM Dorethea Epley, MD [facility]   2022  9:20 AM Dorethea Epley, MD [facility]   2023  9:00 AM MD Gumaro Kumar [facility]       Vickey Duane, LPN

## 2022-06-02 ENCOUNTER — OFFICE VISIT (OUTPATIENT)
Dept: FAMILY MEDICINE CLINIC | Age: 87
End: 2022-06-02
Payer: MEDICARE

## 2022-06-02 VITALS
SYSTOLIC BLOOD PRESSURE: 108 MMHG | WEIGHT: 223 LBS | DIASTOLIC BLOOD PRESSURE: 60 MMHG | BODY MASS INDEX: 29.55 KG/M2 | HEIGHT: 73 IN | OXYGEN SATURATION: 95 % | HEART RATE: 64 BPM

## 2022-06-02 DIAGNOSIS — I10 ESSENTIAL HYPERTENSION: ICD-10-CM

## 2022-06-02 DIAGNOSIS — D61.818 PANCYTOPENIA (HCC): ICD-10-CM

## 2022-06-02 DIAGNOSIS — R07.89 ATYPICAL CHEST PAIN: Primary | ICD-10-CM

## 2022-06-02 DIAGNOSIS — E78.2 MIXED HYPERLIPIDEMIA: ICD-10-CM

## 2022-06-02 DIAGNOSIS — Z09 HOSPITAL DISCHARGE FOLLOW-UP: ICD-10-CM

## 2022-06-02 PROCEDURE — 1111F DSCHRG MED/CURRENT MED MERGE: CPT | Performed by: FAMILY MEDICINE

## 2022-06-02 PROCEDURE — 99212 OFFICE O/P EST SF 10 MIN: CPT | Performed by: FAMILY MEDICINE

## 2022-06-02 PROCEDURE — 99495 TRANSJ CARE MGMT MOD F2F 14D: CPT | Performed by: FAMILY MEDICINE

## 2022-06-02 RX ORDER — ACETAMINOPHEN 500 MG
TABLET ORAL EVERY 24 HOURS
COMMUNITY

## 2022-06-02 RX ORDER — ACETAMINOPHEN/DIPHENHYDRAMINE 500MG-25MG
TABLET ORAL
COMMUNITY
Start: 2022-05-23 | End: 2022-06-02 | Stop reason: ALTCHOICE

## 2022-06-02 RX ORDER — METOPROLOL SUCCINATE 25 MG/1
25 TABLET, EXTENDED RELEASE ORAL DAILY
COMMUNITY
Start: 2022-05-23 | End: 2022-06-02 | Stop reason: ALTCHOICE

## 2022-06-02 ASSESSMENT — PATIENT HEALTH QUESTIONNAIRE - PHQ9
SUM OF ALL RESPONSES TO PHQ QUESTIONS 1-9: 0
1. LITTLE INTEREST OR PLEASURE IN DOING THINGS: 0
SUM OF ALL RESPONSES TO PHQ QUESTIONS 1-9: 0
2. FEELING DOWN, DEPRESSED OR HOPELESS: 0
SUM OF ALL RESPONSES TO PHQ9 QUESTIONS 1 & 2: 0

## 2022-06-02 NOTE — PATIENT INSTRUCTIONS
Can stop both the aspirin and the metoprolol. The blood pressure is a bit on the lower side now. May be a bit high at times but we will watch this once a month or so with a BP check and go from there.

## 2022-06-02 NOTE — PROGRESS NOTES
Post-Discharge Transitional Care Follow Up    Wily Anne   YOB: 1935    Date of Office Visit:  6/2/2022  Date of Hospital Admission: 5/22/22  Date of Hospital Discharge: 5/23/22    Care management risk score Rising risk (score 2-5) and Complex Care (Scores >=6): 0     Non face to face  following discharge, date last encounter closed (first attempt may have been earlier): 5/25/2022  1:13 PM     Call initiated 2 business days of discharge: Yes    ASSESSMENT/PLAN:   Atypical chest pain  Essential hypertension  Mixed hyperlipidemia  Pancytopenia [facility])  Hospital discharge follow-up  -     [place] DISCHARGE MEDS RECONCILED W/ CURRENT OUTPATIENT MED LIST    At this point can advance to his normal activity level within reason. Stress test performed yesterday was negative for any ischemic changes. With the normal stress test and his history of low blood pressures in the past we will go ahead and DC both the metoprolol and the aspirin at this time. Medical Decision Making: moderate complexity  Return in about 6 months (around 12/2/2022) for Medication recheck. Subjective:   Radha Meza was admitted to [facility] after having an episode of chest pain at home. His initial labs and EKG were unremarkable and he was discharged home. However later in the day he had a return of his symptoms. See hospital chart. He was discharged with an outpatient stress test ordered. He completed that yesterday. This was negative for ischemic changes. He has been on metoprolol and baby aspirin since his hospital discharge. Inpatient course: Discharge summary reviewed- see chart. Interval history/Current status: He has not had any chest pain palpitation shortness of breath or dyspnea since his discharge. He has basically been returning to his normal activity level. He has had no weakness PND orthopnea. No leg edema.     Patient Active Problem List   Diagnosis    Essential hypertension    Hyperlipemia    Thrombocytopenia (HCC)    Benign prostatic hyperplasia    Erectile dysfunction    Abnormality of gait    Left renal mass    Pancytopenia ([address]       Medication list at time of discharge reviewed: Yes    Medications marked \"taking\" at this time  Outpatient Medications Marked as Taking for the 6/2/22 encounter (Office Visit) with Greg Rogers MD   Medication Sig Dispense Refill    acetaminophen (TYLENOL) 500 MG tablet every 24 hours      Multiple Vitamins-Minerals (VITAMIN D3 COMPLETE PO) Take by mouth      diclofenac sodium (VOLTAREN) 1 % GEL Apply topically 2 times daily      oxybutynin (DITROPAN-XL) 10 MG extended release tablet TAKE 1 TABLET DAILY 90 tablet 3    ferrous sulfate (SLOW FE) 142 (45 Fe) MG extended release tablet Take 142 mg by mouth daily 30 tablet 5    ketoconazole (NIZORAL) 2 % shampoo APPLY TO SCALP, LATHHER AND RINSE 2 TIMES WEEKLY 120 mL 3    vitamin B-12 (CYANOCOBALAMIN) 1000 MCG tablet Take 1,000 mcg by mouth daily      Elastic Bandages & Supports (WRIST SPLINT) MISC 1 each by Does not apply route daily 1 each 0    atorvastatin (LIPITOR) 20 MG tablet take 1 tablet by mouth at bedtime 90 tablet 3    finasteride (PROSCAR) 5 MG tablet TAKE 1 TABLET DAILY 90 tablet 3    pantoprazole (PROTONIX) 20 MG tablet take 1 tablet by mouth once daily 90 tablet 3    ketoconazole (NIZORAL) 2 % cream Apply topically daily. 30 g 1    docusate sodium (COLACE) 100 MG capsule Take 1 capsule by mouth 2 times daily 60 capsule 0    fexofenadine (ALLEGRA) 180 MG tablet Take 180 mg by mouth daily      mometasone (ELOCON) 0.1 % ointment Apply topically daily.  15 g 0    polyethylene glycol (GLYCOLAX) powder Take 17 g by mouth daily      melatonin 5 MG TABS tablet Take 10 mg by mouth daily           Medications patient taking as of now reconciled against medications ordered at time of hospital discharge: Yes    Review of Systems    Objective:    /60 (Site: Left Upper Arm, Position: Sitting, Cuff Size: Small Adult)   Pulse 64   Ht 6' 1\" (1.854 m)   Wt 223 lb (101.2 kg)   SpO2 95%   BMI 29.42 kg/m²   Physical Exam  Vitals and nursing note reviewed. Constitutional:       Appearance: Normal appearance. He is well-developed. HENT:      Head: Normocephalic and atraumatic. Eyes:      Conjunctiva/sclera: Conjunctivae normal.   Neck:      Thyroid: No thyromegaly. Vascular: No JVD. Cardiovascular:      Rate and Rhythm: Normal rate and regular rhythm. Heart sounds: Normal heart sounds. No murmur heard. No friction rub. No gallop. Pulmonary:      Effort: Pulmonary effort is normal. No respiratory distress. Breath sounds: Normal breath sounds. Musculoskeletal:      Cervical back: Normal range of motion and neck supple. Right lower leg: Edema present. Left lower leg: Edema present. Comments: Chronic brawny changes, 1-2 + edema of lower legs   Lymphadenopathy:      Cervical: No cervical adenopathy. Skin:     General: Skin is warm. Neurological:      General: No focal deficit present. Mental Status: He is alert and oriented to person, place, and time. Psychiatric:         Mood and Affect: Mood normal.         Behavior: Behavior normal.         Thought Content: Thought content normal.         Judgment: Judgment normal.           An electronic signature was used to authenticate this note.   --Cherie Corrales MD

## 2022-06-10 ENCOUNTER — TELEPHONE (OUTPATIENT)
Dept: FAMILY MEDICINE CLINIC | Age: 87
End: 2022-06-10

## 2022-06-10 NOTE — TELEPHONE ENCOUNTER
Left leg-  Thinks may have pulled a muscle. At the hip above the knee at the side of the leg -- has been there for week or so. Notices when he bends over. Ice helps some. Tylenol.

## 2022-06-13 NOTE — TELEPHONE ENCOUNTER
Discussed with patient. He is going to give it another couple of days to see if the pain works out. If is not he will schedule appointment to evaluate.

## 2022-06-28 DIAGNOSIS — K29.01 GASTROINTESTINAL HEMORRHAGE ASSOCIATED WITH ACUTE GASTRITIS: ICD-10-CM

## 2022-06-28 RX ORDER — PANTOPRAZOLE SODIUM 20 MG/1
TABLET, DELAYED RELEASE ORAL
Qty: 90 TABLET | Refills: 3 | Status: SHIPPED | OUTPATIENT
Start: 2022-06-28

## 2022-06-28 NOTE — TELEPHONE ENCOUNTER
Gisela Mcgarry is requesting a refill on the following medication(s):  Requested Prescriptions     Pending Prescriptions Disp Refills    pantoprazole (PROTONIX) 20 MG tablet [Pharmacy Med Name: PANTOPRAZOLE SOD DR 20 MG TAB] 90 tablet 3     Sig: take 1 tablet by mouth once daily       Last Visit Date (If Applicable):  0/7/8583    Next Visit Date:    12/2/2022

## 2022-09-07 LAB
ALBUMIN/GLOBULIN RATIO: 1.5 G/DL
ALBUMIN: 3.7 G/DL (ref 3.5–5)
ALP BLD-CCNC: 73 UNITS/L (ref 38–126)
ALT SERPL-CCNC: 17 UNITS/L (ref 4–50)
ANION GAP SERPL CALCULATED.3IONS-SCNC: 2.9 MMOL/L
AST SERPL-CCNC: 28 UNITS/L (ref 17–59)
BASOPHILS %: 0.71 (ref 0–3)
BASOPHILS ABSOLUTE: 0.04 (ref 0–0.3)
BILIRUB SERPL-MCNC: 0.7 MG/DL (ref 0.2–1.3)
BUN BLDV-MCNC: 21 MG/DL (ref 9–20)
CALCIUM SERPL-MCNC: 8.9 MG/DL (ref 8.4–10.2)
CHLORIDE BLD-SCNC: 108 MMOL/L (ref 98–120)
CHOLESTEROL/HDL RATIO: 2.98 RATIO (ref 0–4.5)
CHOLESTEROL: 158 MG/DL (ref 50–200)
CO2: 31 MMOL/L (ref 22–31)
CREAT SERPL-MCNC: 0.9 MG/DL (ref 0.7–1.3)
EOSINOPHILS %: 3.44 (ref 0–10)
EOSINOPHILS ABSOLUTE: 0.19 (ref 0–1.1)
GFR CALCULATED: > 60
GLOBULIN: 2.6 G/DL
GLUCOSE: 92 MG/DL (ref 75–110)
HCT VFR BLD CALC: 45.9 % (ref 42–52)
HDLC SERPL-MCNC: 53 MG/DL (ref 36–68)
HEMOGLOBIN: 13.5 (ref 13.8–17.8)
IRON SATURATION: 38 % (ref 15–38)
IRON: 116 MG/DL (ref 49–181)
LDL CHOLESTEROL CALCULATED: 93 MG/DL (ref 0–160)
LYMPHOCYTE %: 17.98 (ref 20–51.1)
LYMPHOCYTES ABSOLUTE: 0.97 (ref 1–5.5)
MCH RBC QN AUTO: 31.2 PG (ref 28.5–32.5)
MCHC RBC AUTO-ENTMCNC: 29.5 G/DL (ref 32–37)
MCV RBC AUTO: 106.1 FL (ref 80–94)
MONOCYTES %: 11.19 (ref 1.7–9.3)
MONOCYTES ABSOLUTE: 0.6 (ref 0.1–1)
NEUTROPHILS %: 66.69 (ref 42.2–75.2)
NEUTROPHILS ABSOLUTE: 3.59 (ref 2–8.1)
PDW BLD-RTO: 12.5 % (ref 10–15.5)
PLATELET # BLD: 132.7 THOU/MM3 (ref 130–400)
POTASSIUM SERPL-SCNC: 4.6 MMOL/L (ref 3.6–5)
RBC: 4.33 M/UL (ref 4.7–6.1)
SODIUM BLD-SCNC: 142 MMOL/L (ref 135–145)
TOTAL IRON BINDING CAPACITY: 302 MG/DL (ref 261–497)
TOTAL PROTEIN, SERUM: 6.3 G/DL (ref 6.3–8.2)
TRIGL SERPL-MCNC: 60 MG/DL (ref 10–250)
VLDLC SERPL CALC-MCNC: 12 MG/DL (ref 0–50)
WBC: 5.4 THOU/ML3 (ref 4.8–10.8)

## 2022-09-16 RX ORDER — ATORVASTATIN CALCIUM 20 MG/1
TABLET, FILM COATED ORAL
Qty: 90 TABLET | Refills: 3 | Status: SHIPPED | OUTPATIENT
Start: 2022-09-16

## 2022-09-16 NOTE — TELEPHONE ENCOUNTER
Nathaneil Cowden is requesting a refill on the following medication(s):  Requested Prescriptions     Pending Prescriptions Disp Refills    atorvastatin (LIPITOR) 20 MG tablet [Pharmacy Med Name: ATORVASTATIN 20 MG TABLET] 90 tablet 3     Sig: take 1 tablet by mouth at bedtime       Last Visit Date (If Applicable):  7/5/5326    Next Visit Date:    12/2/2022

## 2022-10-24 ENCOUNTER — TELEPHONE (OUTPATIENT)
Dept: FAMILY MEDICINE CLINIC | Age: 87
End: 2022-10-24

## 2022-10-24 NOTE — TELEPHONE ENCOUNTER
Community screening results of a PAD screening show possible vascular disease in William's Right leg. (JESUS MANUEL 0.84). Should have an appointment to discuss the results and determine next steps. See scanned document.

## 2022-11-01 ENCOUNTER — OFFICE VISIT (OUTPATIENT)
Dept: FAMILY MEDICINE CLINIC | Age: 87
End: 2022-11-01
Payer: MEDICARE

## 2022-11-01 VITALS
DIASTOLIC BLOOD PRESSURE: 74 MMHG | HEART RATE: 67 BPM | SYSTOLIC BLOOD PRESSURE: 138 MMHG | WEIGHT: 229 LBS | OXYGEN SATURATION: 96 % | BODY MASS INDEX: 30.21 KG/M2

## 2022-11-01 DIAGNOSIS — R60.0 LOWER EXTREMITY EDEMA: ICD-10-CM

## 2022-11-01 DIAGNOSIS — I73.9 PERIPHERAL VASCULAR DISEASE (HCC): Primary | ICD-10-CM

## 2022-11-01 DIAGNOSIS — M54.6 CHRONIC MIDLINE THORACIC BACK PAIN: ICD-10-CM

## 2022-11-01 DIAGNOSIS — G89.29 CHRONIC MIDLINE THORACIC BACK PAIN: ICD-10-CM

## 2022-11-01 PROCEDURE — 1123F ACP DISCUSS/DSCN MKR DOCD: CPT | Performed by: FAMILY MEDICINE

## 2022-11-01 PROCEDURE — 99212 OFFICE O/P EST SF 10 MIN: CPT | Performed by: FAMILY MEDICINE

## 2022-11-01 PROCEDURE — 3078F DIAST BP <80 MM HG: CPT | Performed by: FAMILY MEDICINE

## 2022-11-01 PROCEDURE — 99213 OFFICE O/P EST LOW 20 MIN: CPT | Performed by: FAMILY MEDICINE

## 2022-11-01 PROCEDURE — 3074F SYST BP LT 130 MM HG: CPT | Performed by: FAMILY MEDICINE

## 2022-11-01 RX ORDER — LORATADINE 10 MG/1
10 TABLET ORAL DAILY
COMMUNITY

## 2022-11-01 RX ORDER — FAMOTIDINE 20 MG/1
20 TABLET, FILM COATED ORAL 2 TIMES DAILY
COMMUNITY

## 2022-11-01 SDOH — ECONOMIC STABILITY: TRANSPORTATION INSECURITY
IN THE PAST 12 MONTHS, HAS LACK OF TRANSPORTATION KEPT YOU FROM MEETINGS, WORK, OR FROM GETTING THINGS NEEDED FOR DAILY LIVING?: NO

## 2022-11-01 SDOH — ECONOMIC STABILITY: TRANSPORTATION INSECURITY
IN THE PAST 12 MONTHS, HAS THE LACK OF TRANSPORTATION KEPT YOU FROM MEDICAL APPOINTMENTS OR FROM GETTING MEDICATIONS?: NO

## 2022-11-01 SDOH — ECONOMIC STABILITY: FOOD INSECURITY: WITHIN THE PAST 12 MONTHS, THE FOOD YOU BOUGHT JUST DIDN'T LAST AND YOU DIDN'T HAVE MONEY TO GET MORE.: NEVER TRUE

## 2022-11-01 SDOH — ECONOMIC STABILITY: FOOD INSECURITY: WITHIN THE PAST 12 MONTHS, YOU WORRIED THAT YOUR FOOD WOULD RUN OUT BEFORE YOU GOT MONEY TO BUY MORE.: NEVER TRUE

## 2022-11-01 ASSESSMENT — SOCIAL DETERMINANTS OF HEALTH (SDOH): HOW HARD IS IT FOR YOU TO PAY FOR THE VERY BASICS LIKE FOOD, HOUSING, MEDICAL CARE, AND HEATING?: NOT HARD AT ALL

## 2022-11-01 NOTE — PROGRESS NOTES
[address] [facility]  [address]  Dept: [phone]  Dept PJZ:[phone]    Gardenia Pruitt is a 81 y.o. male who presents today for his medical conditions/complaints as notedbelow. Gardenia Pruitt is c/o of Hypertension (Follow up - review screening results)        Assessment/Plan:     1. Peripheral vascular disease ([address]  -     Vascular JESUS MANUEL; Future  2. Lower extremity edema  3. Chronic midline thoracic back pain  -     Misc. Devices (ALL-BODY MASSAGE) MISC; 1 each by Does not apply route once a week Weekly as needed  massage for muscle pain, Disp-1 each, R-11Normal    Has risk factors for PVD_  has the known venous insufficiency and has managed this with elevation and compression socks in the past.     Will check the formal JESUS MANUEL bilaterally and if this is abnormal then will refer to Vascular for further evaluation and treatment. Continue with the massage therapy for the upper back and neck tightness as this has been helpful in the past.    Lab Results   Component Value Date    WBC 5.4 09/07/2022    HGB 13.5 (L) 09/07/2022    HCT 45.9 09/07/2022    .7 09/07/2022    CHOL 158 09/07/2022    TRIG 60 09/07/2022    HDL 53 09/07/2022    ALT 17 09/07/2022    AST 28 09/07/2022     09/07/2022    K 4.6 09/07/2022     09/07/2022    CREATININE 0.9 09/07/2022    BUN 21 (H) 09/07/2022    CO2 31 09/07/2022    INR 1.2 05/21/2022       Return for As scheduled. Subjective:      HPI:     HPI    Gabriella Espana had a screening with his insurance company. They did a PAD screening which showed his ankle-brachial index was 1.01 on the left and 0.84 on the right. He has a longstanding history of lower extremity varicosities. He also has a history of intermittent hypertension and mild hypercholesterolemia for which he takes atorvastatin 20 mg daily. Most recent lipid panel showed total cholesterol 158 LDL cholesterol 93 HDL of 53.     BP Readings from Last 3 Encounters:   11/01/22 138/74   06/02/22 108/60   03/21/22 134/64          (goal 120/80)    Wt Readings from Last 3 Encounters:   11/01/22 229 lb (103.9 kg)   06/02/22 223 lb (101.2 kg)   03/21/22 227 lb (103 kg)        Past Medical History:   Diagnosis Date    C2 cervical fracture (HCC)     Cancer (HCC)     skin    Enlarged prostate     Hyperlipidemia     Thrombocytopenia (HCC)     borderline episodic      Past Surgical History:   Procedure Laterality Date    CERVICAL SPINE SURGERY  03/29/2016    [facility] C2 Fracture    COLONOSCOPY  09/2016    [facility]    HIP SURGERY Right     ORIF after fall on the ice    HIP SURGERY Right 01/2015    UPPER GASTROINTESTINAL ENDOSCOPY  06/03/2019       Family History   Problem Relation Age of Onset    Stroke Father     Coronary Art Dis Father     Stroke Sister        Social History     Tobacco Use    Smoking status: Never    Smokeless tobacco: Never   Substance Use Topics    Alcohol use: Yes     Comment: occasionally      Current Outpatient Medications   Medication Sig Dispense Refill    loratadine (CLARITIN) 10 MG tablet Take 10 mg by mouth daily      famotidine (PEPCID) 20 MG tablet Take 20 mg by mouth 2 times daily      Misc.  Devices (ALL-BODY MASSAGE) MISC 1 each by Does not apply route once a week Weekly as needed  massage for muscle pain 1 each 11    atorvastatin (LIPITOR) 20 MG tablet take 1 tablet by mouth at bedtime 90 tablet 3    pantoprazole (PROTONIX) 20 MG tablet take 1 tablet by mouth once daily 90 tablet 3    acetaminophen (TYLENOL) 500 MG tablet every 24 hours      diclofenac sodium (VOLTAREN) 1 % GEL Apply topically 2 times daily      oxybutynin (DITROPAN-XL) 10 MG extended release tablet TAKE 1 TABLET DAILY 90 tablet 3    ferrous sulfate (SLOW FE) 142 (45 Fe) MG extended release tablet Take 142 mg by mouth daily 30 tablet 5    ketoconazole (NIZORAL) 2 % shampoo APPLY TO SCALP, LATHHER AND RINSE 2 TIMES WEEKLY 120 mL 3    vitamin B-12 (CYANOCOBALAMIN) 1000 MCG tablet Take 1,000 mcg by mouth daily      ketoconazole (NIZORAL) 2 % cream Apply topically daily. 30 g 1    docusate sodium (COLACE) 100 MG capsule Take 1 capsule by mouth 2 times daily 60 capsule 0    mometasone (ELOCON) 0.1 % ointment Apply topically daily. 15 g 0    polyethylene glycol (GLYCOLAX) powder Take 17 g by mouth daily       No current facility-administered medications for this visit. No Known Allergies    Health Maintenance   Topic Date Due    COVID-19 Vaccine (4 - Booster for Moderna series) 12/31/2021    Flu vaccine (1) 08/01/2022    Annual Wellness Visit (AWV)  12/03/2022    Depression Screen  06/02/2023    Lipids  09/07/2023    DTaP/Tdap/Td vaccine (3 - Td or Tdap) 03/06/2030    Shingles vaccine  Completed    Pneumococcal 65+ years Vaccine  Completed    Hepatitis A vaccine  Aged Out    Hib vaccine  Aged Out    Meningococcal (ACWY) vaccine  Aged Out         Review of Systems    Objective:     /74 (Site: Left Upper Arm, Position: Sitting, Cuff Size: Large Adult)   Pulse 67   Wt 229 lb (103.9 kg)   SpO2 96%   BMI 30.21 kg/m²     Physical Exam  Vitals and nursing note reviewed. Constitutional:       Appearance: Normal appearance. He is well-developed. HENT:      Head: Normocephalic and atraumatic. Eyes:      Conjunctiva/sclera: Conjunctivae normal.   Neck:      Thyroid: No thyromegaly. Vascular: No JVD. Cardiovascular:      Rate and Rhythm: Normal rate and regular rhythm. Heart sounds: Normal heart sounds. No murmur heard. No friction rub. No gallop. Pulmonary:      Effort: Pulmonary effort is normal. No respiratory distress. Breath sounds: Normal breath sounds. Musculoskeletal:      Cervical back: Normal range of motion and neck supple. Right lower leg: Edema present. Left lower leg: Edema present. Comments: Chronic brawny changes, 1-2 + edema of lower legs   Lymphadenopathy:      Cervical: No cervical adenopathy.    Skin:     General: Skin is warm.   Neurological:      General: No focal deficit present. Mental Status: He is alert and oriented to person, place, and time. Psychiatric:         Mood and Affect: Mood normal.         Behavior: Behavior normal.         Thought Content: Thought content normal.         Judgment: Judgment normal.             Multiple labs and other testing may have been ordered which may not be completely evident from the above note due to system interface incompatibilities. Patient given educational materials - see patientinstructions. Discussed use, benefit, and side effects of prescribed medications. All patient questions answered. Pt voiced understanding. Reviewed health maintenance. Instructed to continue current medications, diet andexercise. Patient agreed with treatment plan. Follow up as directed.      (Please note that portions of this note were completed with a voice-recognition program. Efforts were made to edit the dictation but occasionally words are mis-transcribed.)    Electronically signed by Nereyda Charles MD on 11/6/2022

## 2022-11-09 NOTE — RESULT ENCOUNTER NOTE
Please notify patient the labs/results are normal.  There is very minimal arterial disease but nothing that needs any additional treatment or follow up.

## 2022-12-19 ENCOUNTER — OFFICE VISIT (OUTPATIENT)
Dept: FAMILY MEDICINE CLINIC | Age: 87
End: 2022-12-19
Payer: MEDICARE

## 2022-12-19 VITALS
BODY MASS INDEX: 30.08 KG/M2 | SYSTOLIC BLOOD PRESSURE: 138 MMHG | DIASTOLIC BLOOD PRESSURE: 84 MMHG | HEART RATE: 54 BPM | WEIGHT: 228 LBS

## 2022-12-19 DIAGNOSIS — S46.011A STRAIN OF TENDON OF RIGHT ROTATOR CUFF, INITIAL ENCOUNTER: Primary | ICD-10-CM

## 2022-12-19 PROCEDURE — 3078F DIAST BP <80 MM HG: CPT | Performed by: FAMILY MEDICINE

## 2022-12-19 PROCEDURE — 99212 OFFICE O/P EST SF 10 MIN: CPT | Performed by: FAMILY MEDICINE

## 2022-12-19 PROCEDURE — 1123F ACP DISCUSS/DSCN MKR DOCD: CPT | Performed by: FAMILY MEDICINE

## 2022-12-19 PROCEDURE — 3074F SYST BP LT 130 MM HG: CPT | Performed by: FAMILY MEDICINE

## 2022-12-19 PROCEDURE — 99213 OFFICE O/P EST LOW 20 MIN: CPT | Performed by: FAMILY MEDICINE

## 2022-12-19 RX ORDER — PREDNISONE 20 MG/1
20 TABLET ORAL DAILY
Qty: 5 TABLET | Refills: 0 | Status: SHIPPED | OUTPATIENT
Start: 2022-12-19 | End: 2022-12-24

## 2022-12-19 NOTE — PATIENT INSTRUCTIONS
Start with the prednisone daily for 5 days-- with food. Can continue on the acetaminopehn as needed (what you have at home)    Ice for 20 minutes a few times per day-- if heat feels better after the first few days can use this. Try the home exercises gently-- if not improving let me know and we can do some therapy.

## 2022-12-19 NOTE — PROGRESS NOTES
[address] [facility]  [address]  Dept: [phone]  Dept VNE:[phone]    Emilia Hendrix is a 81 y.o. male who presents today for his medical conditions/complaints as notedbelow. Emilia Hendrix is c/o of Shoulder Pain (Right shoulder and arm pain for the past 3-4 days. Over the weekend it went away but then came back today. )      Assessment/Plan:     1. Strain of tendon of right rotator cuff, initial encounter  -     predniSONE (DELTASONE) 20 MG tablet; Take 1 tablet by mouth daily for 5 days, Disp-5 tablet, R-0Normal    Start with the prednisone daily for 5 days-- with food. Can continue on the acetaminopehn as needed (what you have at home)    Ice for 20 minutes a few times per day-- if heat feels better after the first few days can use this. Try the home exercises gently-- if not improving let me know and we can do some therapy. Lab Results   Component Value Date    WBC 5.4 09/07/2022    HGB 13.5 (L) 09/07/2022    HCT 45.9 09/07/2022    .7 09/07/2022    CHOL 158 09/07/2022    TRIG 60 09/07/2022    HDL 53 09/07/2022    ALT 17 09/07/2022    AST 28 09/07/2022     09/07/2022    K 4.6 09/07/2022     09/07/2022    CREATININE 0.9 09/07/2022    BUN 21 (H) 09/07/2022    CO2 31 09/07/2022    INR 1.2 05/21/2022       Return if symptoms worsen or fail to improve. Subjective:      HPI:     HPI    Does not recall an injury-- pain in the upper outer shoulder- was shopping and carrying anything. Would actually feel better when he was doing something but then it would come back when he would rest.  Does lay on that side a lot. Raising it above the shoulder bothers it more. No numbness or tingling at all. No weakness. Has not tried anything except some acetaminophen.        BP Readings from Last 3 Encounters:   12/19/22 138/84   11/01/22 138/74   06/02/22 108/60          (goal 120/80)    Wt Readings from Last 3 Encounters: 12/19/22 228 lb (103.4 kg)   11/01/22 229 lb (103.9 kg)   06/02/22 223 lb (101.2 kg)        Past Medical History:   Diagnosis Date    C2 cervical fracture (HCC)     Cancer (HCC)     skin    Enlarged prostate     Hyperlipidemia     Thrombocytopenia (HCC)     borderline episodic      Past Surgical History:   Procedure Laterality Date    CERVICAL SPINE SURGERY  03/29/2016    [facility] C2 Fracture    COLONOSCOPY  09/2016    [facility]    HIP SURGERY Right     ORIF after fall on the ice    HIP SURGERY Right 01/2015    UPPER GASTROINTESTINAL ENDOSCOPY  06/03/2019       Family History   Problem Relation Age of Onset    Stroke Father     Coronary Art Dis Father     Stroke Sister        Social History     Tobacco Use    Smoking status: Never    Smokeless tobacco: Never   Substance Use Topics    Alcohol use: Yes     Comment: occasionally      Current Outpatient Medications   Medication Sig Dispense Refill    famotidine (PEPCID) 20 MG tablet Take 20 mg by mouth 2 times daily      atorvastatin (LIPITOR) 20 MG tablet take 1 tablet by mouth at bedtime 90 tablet 3    pantoprazole (PROTONIX) 20 MG tablet take 1 tablet by mouth once daily 90 tablet 3    acetaminophen (TYLENOL) 500 MG tablet every 24 hours      diclofenac sodium (VOLTAREN) 1 % GEL Apply topically 2 times daily      oxybutynin (DITROPAN-XL) 10 MG extended release tablet TAKE 1 TABLET DAILY 90 tablet 3    ferrous sulfate (SLOW FE) 142 (45 Fe) MG extended release tablet Take 142 mg by mouth daily 30 tablet 5    ketoconazole (NIZORAL) 2 % shampoo APPLY TO SCALP, LATHHER AND RINSE 2 TIMES WEEKLY 120 mL 3    vitamin B-12 (CYANOCOBALAMIN) 1000 MCG tablet Take 1,000 mcg by mouth daily      ketoconazole (NIZORAL) 2 % cream Apply topically daily. 30 g 1    docusate sodium (COLACE) 100 MG capsule Take 1 capsule by mouth 2 times daily 60 capsule 0    mometasone (ELOCON) 0.1 % ointment Apply topically daily.  15 g 0    polyethylene glycol (GLYCOLAX) powder Take 17 g by mouth daily loratadine (CLARITIN) 10 MG tablet Take 10 mg by mouth daily      Misc. Devices (ALL-BODY MASSAGE) MISC 1 each by Does not apply route once a week Weekly as needed  massage for muscle pain 1 each 11     No current facility-administered medications for this visit. No Known Allergies    Health Maintenance   Topic Date Due    Annual Wellness Visit (AWV)  12/03/2022    Depression Screen  06/02/2023    Lipids  09/07/2023    DTaP/Tdap/Td vaccine (3 - Td or Tdap) 03/06/2030    Flu vaccine  Completed    Shingles vaccine  Completed    Pneumococcal 65+ years Vaccine  Completed    COVID-19 Vaccine  Completed    Hepatitis A vaccine  Aged Out    Hib vaccine  Aged Out    Meningococcal (ACWY) vaccine  Aged Out         Review of Systems    Objective:     /84 (Site: Left Upper Arm, Position: Sitting, Cuff Size: Large Adult)   Pulse 54   Wt 228 lb (103.4 kg)   BMI 30.08 kg/m²     Physical Exam  Vitals and nursing note reviewed. Constitutional:       Appearance: Normal appearance. Cardiovascular:      Rate and Rhythm: Normal rate. Pulmonary:      Effort: Pulmonary effort is normal.   Musculoskeletal:         General: Tenderness present. No deformity. Normal range of motion. Cervical back: Neck supple. Comments: Full range of motion noted but tenderness over the biceps insertion anteriorly and the lateral supraspinatus tendon. Positive drop arm positive empty can mildly positive liftoff. Mildly positive crossarm. Positive Neer. Normal DTR no deformity no heat no warmth   Neurological:      Mental Status: He is alert. Multiple labs and other testing may have been ordered which may not be completely evident from the above note due to system interface incompatibilities. Patient given educational materials - see patientinstructions. Discussed use, benefit, and side effects of prescribed medications. All patient questions answered. Pt voiced understanding. Reviewed health maintenance. Instructed to continue current medications, diet andexercise. Patient agreed with treatment plan. Follow up as directed.      (Please note that portions of this note were completed with a voice-recognition program. Efforts were made to edit the dictation but occasionally words are mis-transcribed.)    Electronically signed by Yadira Merino MD on 12/25/2022

## 2022-12-21 ENCOUNTER — TELEPHONE (OUTPATIENT)
Dept: FAMILY MEDICINE CLINIC | Age: 87
End: 2022-12-21

## 2023-01-04 ENCOUNTER — OFFICE VISIT (OUTPATIENT)
Dept: UROLOGY | Age: 88
End: 2023-01-04
Payer: MEDICARE

## 2023-01-04 VITALS
RESPIRATION RATE: 16 BRPM | OXYGEN SATURATION: 94 % | HEIGHT: 73 IN | HEART RATE: 65 BPM | SYSTOLIC BLOOD PRESSURE: 114 MMHG | BODY MASS INDEX: 30.09 KG/M2 | DIASTOLIC BLOOD PRESSURE: 64 MMHG | WEIGHT: 227 LBS

## 2023-01-04 DIAGNOSIS — N28.89 RENAL MASS: ICD-10-CM

## 2023-01-04 DIAGNOSIS — N13.8 HYPERTROPHY OF PROSTATE WITH URINARY OBSTRUCTION: Primary | ICD-10-CM

## 2023-01-04 DIAGNOSIS — N40.1 HYPERTROPHY OF PROSTATE WITH URINARY OBSTRUCTION: Primary | ICD-10-CM

## 2023-01-04 DIAGNOSIS — R39.15 URGENCY OF URINATION: ICD-10-CM

## 2023-01-04 DIAGNOSIS — N50.89 SCROTAL SWELLING: ICD-10-CM

## 2023-01-04 PROCEDURE — 1123F ACP DISCUSS/DSCN MKR DOCD: CPT | Performed by: UROLOGY

## 2023-01-04 PROCEDURE — 99214 OFFICE O/P EST MOD 30 MIN: CPT | Performed by: UROLOGY

## 2023-01-04 NOTE — PROGRESS NOTES
Dr. Lisandro John MD MD  [facility] Urology Clinic Consultation / New Patient Visit    Patient:  Adam Stevens  YOB: 1935  Date: 1/4/2023  Consult requested from Jesse Atkinson MD     HISTORY OF PRESENT ILLNESS:   The patient is a 81 y.o. male who presents today for follow-up for the following problem(s): right hydrocele  Overall the problem(s) : are worsening. Associated Symptoms: No dysuria, gross hematuria. Pain Severity:      Today visit:   1/5/22   Post os s/p greenlight pvp for BPH, doing well - no hematuria. Nocturia improved on Oxybutynin 10 mg QHS, 2-3 times. AUASS: 10. 2.    - Will follow renal mass in future - CT scan shows stability of renal mass, most likely hyperdense cyst.  No longer need to follow. 6/23/21  Today we discuss BPH with LUTs, and his findings of enlarged prostate with complex features. He is interested in [place]. We have a discussion with him and Dr. Malina Christian, who is present in the room. Risks benefits and alternative procedures are explained, informed consent is obtained, and the patient elects to proceed. 4/7/21   Azeb Jha follows with us for a right sided hydrocele, and a renal cyst with complex features. The cyst is small and appears stable on serial imaging. GAEL is reviewed, and the mass measure 2.5 cm. The He states the hydrocele is  no longer bothersome to him,  This actually appears to be a hernia of the right inguinal canal, which has been repaired and improved. BPH with LUTs appears to be controlled on Proscar. AUASS: 7/2. His urinary frequency and urgency are controlled on Oxybutynin. He states he has urinary incontinence is worsening despite medical therapy. 3/31/20 - this visit was performed with a video teleconference.   Doing well   Had a CT scan - multiple renal cysts and a hyperdense cyst  GAEL - demonstrated a renal cyst with solid component, consistent with hyperdense cysto demonstrated on CT scan  Pt asymptomatic  Denies hematuria    3/4/20  Hydrocele is improving, smaller size  - possibly reactive after shoveling snow  New onset frequency and urgency  - frequency x 4  - urgency: mild  Pt is not bothered by this   No pain  No hematuria    1/22/20  Started Saturday after shoveling snow  No pain, no trauma  TUS (reviewed) - no hernia  - complex right hydrocele, no masses, no torsion  Patient not bothered by this       Summary of old records:   (Patient's old records, notes and chart reviewed and summarized above.)    Last several PSA's:  No results found for: PSA    Last total testosterone:  No results found for: TESTOSTERONE    Urinalysis today:  No results found for this visit on 01/04/23.       Last BUN and creatinine:  Lab Results   Component Value Date    BUN 21 (H) 09/07/2022     Lab Results   Component Value Date    CREATININE 0.9 09/07/2022       Imaging Reviewed during this Office Visit:   (results were independently reviewed by physician and radiology report verified)    PAST MEDICAL, FAMILY AND SOCIAL HISTORY:  Past Medical History:   Diagnosis Date    C2 cervical fracture ([facility] [address]     Cancer ([facility] [address]     skin    Enlarged prostate     Hyperlipidemia     Thrombocytopenia ([facility] [address]     borderline episodic     Past Surgical History:   Procedure Laterality Date    CERVICAL SPINE SURGERY  03/29/2016    [facility] C2 Fracture    COLONOSCOPY  09/2016    [facility]    HIP SURGERY Right     ORIF after fall on the ice    HIP SURGERY Right 01/2015    UPPER GASTROINTESTINAL ENDOSCOPY  06/03/2019     Family History   Problem Relation Age of Onset    Stroke Father     Coronary Art Dis Father     Stroke Sister      Outpatient Medications Marked as Taking for the 1/4/23 encounter (Office Visit) with Fernando Galaviz MD   Medication Sig Dispense Refill    loratadine (CLARITIN) 10 MG tablet Take 10 mg by mouth daily      famotidine (PEPCID) 20 MG tablet Take 20 mg by mouth 2 times daily      atorvastatin (LIPITOR) 20 MG tablet take 1 tablet by mouth at bedtime 90 tablet 3    pantoprazole (PROTONIX) 20 MG tablet take 1 tablet by mouth once daily 90 tablet 3    acetaminophen (TYLENOL) 500 MG tablet every 24 hours      diclofenac sodium (VOLTAREN) 1 % GEL Apply topically 2 times daily      oxybutynin (DITROPAN-XL) 10 MG extended release tablet TAKE 1 TABLET DAILY 90 tablet 3    ferrous sulfate (SLOW FE) 142 (45 Fe) MG extended release tablet Take 142 mg by mouth daily 30 tablet 5    ketoconazole (NIZORAL) 2 % shampoo APPLY TO SCALP, LATHHER AND RINSE 2 TIMES WEEKLY 120 mL 3    vitamin B-12 (CYANOCOBALAMIN) 1000 MCG tablet Take 1,000 mcg by mouth daily      ketoconazole (NIZORAL) 2 % cream Apply topically daily. 30 g 1    docusate sodium (COLACE) 100 MG capsule Take 1 capsule by mouth 2 times daily 60 capsule 0    mometasone (ELOCON) 0.1 % ointment Apply topically daily. 15 g 0    polyethylene glycol (GLYCOLAX) powder Take 17 g by mouth daily         Patient has no known allergies. Social History     Tobacco Use   Smoking Status Never   Smokeless Tobacco Never       Social History     Substance and Sexual Activity   Alcohol Use Yes    Comment: occasionally       REVIEW OF SYSTEMS:  Constitutional: negative  Eyes: negative  Respiratory: negative  Cardiovascular: negative  Gastrointestinal: negative  Musculoskeletal: negative  Genitourinary: negative  Skin: negative   Neurological: negative  Hematological/Lymphatic: negative  Psychological: negative    Physical Exam:    This a 81 y.o. male   Vitals:    01/04/23 0849   BP: 114/64   Pulse: 65   Resp: 16   SpO2: 94%     Constitutional: Patient in no acute distress   Neuro: alert and oriented to person place and time. Psych: Mood and affect normal.  Head: atraumatic normocephalic  Eyes: EOMi  HEENT: neck supple, trachea midline  Lungs: Respiratory effort normal  FROMx4  Skin:  dry      Assessment and Plan      No diagnosis found. Plan:      No follow-ups on file.   Hydrocele unchanged - offered treatment: hydrocelectomy, but would like to observe  New right inguinal hernia - repaired by Dr. Mata Mays    BPH with LUTs - improved after Greenlight PVP - off meds  - AUASS: 8/1  - Frequency and urgency with incontinence - improved with oxybutynin 10 mg, we recommend trying PM dosing to help with nocturia. If still symptomatic we will consider cystoscopy.      Renal cyst - stable complex cyst - patient agrees, no need to follow at this point

## 2023-01-09 ENCOUNTER — OFFICE VISIT (OUTPATIENT)
Dept: FAMILY MEDICINE CLINIC | Age: 88
End: 2023-01-09
Payer: MEDICARE

## 2023-01-09 VITALS
BODY MASS INDEX: 29.16 KG/M2 | OXYGEN SATURATION: 96 % | HEART RATE: 66 BPM | DIASTOLIC BLOOD PRESSURE: 74 MMHG | HEIGHT: 73 IN | WEIGHT: 220 LBS | SYSTOLIC BLOOD PRESSURE: 136 MMHG

## 2023-01-09 DIAGNOSIS — I73.9 PERIPHERAL VASCULAR DISEASE (HCC): ICD-10-CM

## 2023-01-09 DIAGNOSIS — D69.6 THROMBOCYTOPENIA (HCC): ICD-10-CM

## 2023-01-09 DIAGNOSIS — Z00.00 MEDICARE ANNUAL WELLNESS VISIT, SUBSEQUENT: Primary | ICD-10-CM

## 2023-01-09 PROBLEM — D61.818 PANCYTOPENIA (HCC): Status: RESOLVED | Noted: 2022-03-21 | Resolved: 2023-01-09

## 2023-01-09 PROCEDURE — 1123F ACP DISCUSS/DSCN MKR DOCD: CPT | Performed by: FAMILY MEDICINE

## 2023-01-09 PROCEDURE — G0439 PPPS, SUBSEQ VISIT: HCPCS | Performed by: FAMILY MEDICINE

## 2023-01-09 PROCEDURE — 99397 PER PM REEVAL EST PAT 65+ YR: CPT | Performed by: FAMILY MEDICINE

## 2023-01-09 ASSESSMENT — LIFESTYLE VARIABLES
HOW MANY STANDARD DRINKS CONTAINING ALCOHOL DO YOU HAVE ON A TYPICAL DAY: 1 OR 2
HOW OFTEN DO YOU HAVE A DRINK CONTAINING ALCOHOL: MONTHLY OR LESS

## 2023-01-09 ASSESSMENT — PATIENT HEALTH QUESTIONNAIRE - PHQ9
2. FEELING DOWN, DEPRESSED OR HOPELESS: 0
SUM OF ALL RESPONSES TO PHQ QUESTIONS 1-9: 0
SUM OF ALL RESPONSES TO PHQ9 QUESTIONS 1 & 2: 0
SUM OF ALL RESPONSES TO PHQ QUESTIONS 1-9: 0
SUM OF ALL RESPONSES TO PHQ QUESTIONS 1-9: 0
1. LITTLE INTEREST OR PLEASURE IN DOING THINGS: 0
SUM OF ALL RESPONSES TO PHQ QUESTIONS 1-9: 0

## 2023-01-09 NOTE — PATIENT INSTRUCTIONS
Personalized Preventive Plan for Aguila Mccarthy - 1/9/2023  Medicare offers a range of preventive health benefits. Some of the tests and screenings are paid in full while other may be subject to a deductible, co-insurance, and/or copay.    Some of these benefits include a comprehensive review of your medical history including lifestyle, illnesses that may run in your family, and various assessments and screenings as appropriate.    After reviewing your medical record and screening and assessments performed today your provider may have ordered immunizations, labs, imaging, and/or referrals for you.  A list of these orders (if applicable) as well as your Preventive Care list are included within your After Visit Summary for your review.    Other Preventive Recommendations:    A preventive eye exam performed by an eye specialist is recommended every 1-2 years to screen for glaucoma; cataracts, macular degeneration, and other eye disorders.  A preventive dental visit is recommended every 6 months.  Try to get at least 150 minutes of exercise per week or 10,000 steps per day on a pedometer .  Order or download the FREE \"Exercise & Physical Activity: Your Everyday Guide\" from [employer]. Call 1-141.839.2930 or search [employer] online.  You need 1057-4289 mg of calcium and 2883-6469 IU of vitamin D per day. It is possible to meet your calcium requirement with diet alone, but a vitamin D supplement is usually necessary to meet this goal.  When exposed to the sun, use a sunscreen that protects against both UVA and UVB radiation with an SPF of 30 or greater. Reapply every 2 to 3 hours or after sweating, drying off with a towel, or swimming.  Always wear a seat belt when traveling in a car. Always wear a helmet when riding a bicycle or motorcycle.       Advance Directives: Care Instructions  Overview  An advance directive is a legal way to state your wishes at the end of your life. It  tells your family and your doctor what to do if you can't say what you want. There are two main types of advance directives. You can change them any time your wishes change. Living will. This form tells your family and your doctor your wishes about life support and other treatment. The form is also called a declaration. Medical power of . This form lets you name a person to make treatment decisions for you when you can't speak for yourself. This person is called a health care agent (health care proxy, health care surrogate). The form is also called a durable power of  for health care. If you do not have an advance directive, decisions about your medical care may be made by a family member, or by a doctor or a  who doesn't know you. It may help to think of an advance directive as a gift to the people who care for you. If you have one, they won't have to make tough decisions by themselves. For more information, including forms for your state, see the [address] [facility] website (www.caringinfo.org/planning/advance-directives/). Follow-up care is a key part of your treatment and safety. Be sure to make and go to all appointments, and call your doctor if you are having problems. It's also a good idea to know your test results and keep a list of the medicines you take. What should you include in an advance directive? Many states have a unique advance directive form. (It may ask you to address specific issues.) Or you might use a universal form that's approved by many states. If your form doesn't tell you what to address, it may be hard to know what to include in your advance directive. Use the questions below to help you get started. Who do you want to make decisions about your medical care if you are not able to? What life-support measures do you want if you have a serious illness that gets worse over time or can't be cured? What are you most afraid of that might happen?  (Maybe you're afraid of having pain, losing your independence, or being kept alive by machines.)  Where would you prefer to die? (Your home? A hospital? A nursing home?)  Do you want to donate your organs when you die? Do you want certain [employer]ish practices performed before you die? When should you call for help? Be sure to contact your doctor if you have any questions. Where can you learn more? Go to http://www.gomez.com/ and enter R264 to learn more about \"Advance Directives: Care Instructions. \"  Current as of: June 16, 2022               Content Version: 13.5  © 2861-9523 Healthwise, [place]. Care instructions adapted under license by [employer] [facility] [facility]). If you have questions about a medical condition or this instruction, always ask your healthcare professional. [address] any warranty or liability for your use of this information. Personalized Preventive Plan for Danya Barrera - 1/9/2023  Medicare offers a range of preventive health benefits. Some of the tests and screenings are paid in full while other may be subject to a deductible, co-insurance, and/or copay. Some of these benefits include a comprehensive review of your medical history including lifestyle, illnesses that may run in your family, and various assessments and screenings as appropriate. After reviewing your medical record and screening and assessments performed today your provider may have ordered immunizations, labs, imaging, and/or referrals for you. A list of these orders (if applicable) as well as your Preventive Care list are included within your After Visit Summary for your review. Other Preventive Recommendations:    A preventive eye exam performed by an eye specialist is recommended every 1-2 years to screen for glaucoma; cataracts, macular degeneration, and other eye disorders. A preventive dental visit is recommended every 6 months.   Try to get at least 150 minutes of exercise per week or 10,000 steps per day on a pedometer . Order or download the FREE \"Exercise & Physical Activity: Your Everyday Guide\" from [employer]. Call 8-448.254.6717 or search [employer] online. You need 8359-4175 mg of calcium and 1789-4158 IU of vitamin D per day. It is possible to meet your calcium requirement with diet alone, but a vitamin D supplement is usually necessary to meet this goal.  When exposed to the sun, use a sunscreen that protects against both UVA and UVB radiation with an SPF of 30 or greater. Reapply every 2 to 3 hours or after sweating, drying off with a towel, or swimming. Always wear a seat belt when traveling in a car. Always wear a helmet when riding a bicycle or motorcycle.

## 2023-01-09 NOTE — PROGRESS NOTES
Medicare Annual Wellness Visit    Gely Saleem is here for Medicare AWV and Hearing Problem (Decreased hearing - wants ears checked)    Assessment & Plan   Medicare annual wellness visit, subsequent  Peripheral vascular disease ([facility] [address]  Thrombocytopenia ([facility] [address]    Recommendations for Preventive Services Due: see orders and patient instructions/AVS.    No issues at this time from the Venous insufficency. No changes and recommend continued compression sock use. Recent JESUS MANUEL were not concerning. For the hyperlipidemia, asx and labs have been stable. Recheck in the fall. H/O thrombocytopenia is also stable at this time and asx. .     Recommended screening schedule for the next 5-10 years is provided to the patient in written form: see Patient Instructions/AVS.     Return in 6 months (on 7/9/2023) for Medicare Annual Wellness Visit in 1 year, Medication recheck. Subjective   The following acute and/or chronic problems were also addressed today:  No dizziness or lightheadedness- if gets up too fast for a sec only. No CP or SOB really. No increased in his mild leg swelling. Wears the compression hose most of the time. Recent right arm pain has resolved. Patient's complete Health Risk Assessment and screening values have been reviewed and are found in Flowsheets. The following problems were reviewed today and where indicated follow up appointments were made and/or referrals ordered. Positive Risk Factor Screenings with Interventions:               General HRA Questions:  Select all that apply: (!) New or Increased Pain    Pain Interventions:  Patient c/o of increased pain all over. He stopped exercising for awhile and recently started again. His pain is from soreness in the muscles and has almost completely resolved now.           Hearing Screen:  Do you or your family notice any trouble with your hearing that hasn't been managed with hearing aids?: (!) Yes    Interventions:  Patient comments: Patient's wife thinks that he has trouble with his hearing. She wants his ears checked and cleaned if appropriate. Wt Readings from Last 3 Encounters:   01/09/23 220 lb (99.8 kg)   01/04/23 227 lb (103 kg)   12/19/22 228 lb (103.4 kg)             Objective   Vitals:    01/09/23 0909   BP: 136/74   Site: Left Upper Arm   Position: Sitting   Cuff Size: Large Adult   Pulse: 66   SpO2: 96%   Weight: 220 lb (99.8 kg)   Height: 6' 1\" (1.854 m)      Body mass index is 29.03 kg/m². Physical Exam  Vitals and nursing note reviewed. Constitutional:       Appearance: Normal appearance. He is well-developed. HENT:      Head: Normocephalic and atraumatic. Eyes:      Conjunctiva/sclera: Conjunctivae normal.   Neck:      Thyroid: No thyromegaly. Vascular: No JVD. Cardiovascular:      Rate and Rhythm: Normal rate and regular rhythm. Heart sounds: Normal heart sounds. No murmur heard. No friction rub. No gallop. Pulmonary:      Effort: Pulmonary effort is normal. No respiratory distress. Breath sounds: Normal breath sounds. Musculoskeletal:      Cervical back: Normal range of motion and neck supple. Right lower leg: Edema present. Left lower leg: Edema present. Comments: Chronic brawny changes, 1-2 + edema of lower legs   Lymphadenopathy:      Cervical: No cervical adenopathy. Skin:     General: Skin is warm. Neurological:      General: No focal deficit present. Mental Status: He is alert and oriented to person, place, and time. Psychiatric:         Mood and Affect: Mood normal.         Behavior: Behavior normal.         Thought Content: Thought content normal.         Judgment: Judgment normal.          No Known Allergies  Prior to Visit Medications    Medication Sig Taking?  Authorizing Provider   loratadine (CLARITIN) 10 MG tablet Take 10 mg by mouth daily Yes Historical Provider, MD   famotidine (PEPCID) 20 MG tablet Take 20 mg by mouth 2 times daily Yes Historical Provider, MD   atorvastatin (LIPITOR) 20 MG tablet take 1 tablet by mouth at bedtime Yes Juani Cali MD   pantoprazole (PROTONIX) 20 MG tablet take 1 tablet by mouth once daily Yes Juani Cali MD   acetaminophen (TYLENOL) 500 MG tablet every 24 hours Yes Historical Provider, MD   diclofenac sodium (VOLTAREN) 1 % GEL Apply topically 2 times daily Yes Historical Provider, MD   oxybutynin (DITROPAN-XL) 10 MG extended release tablet TAKE 1 TABLET DAILY Yes Juani Cali MD   ferrous sulfate (SLOW FE) 142 (45 Fe) MG extended release tablet Take 142 mg by mouth daily Yes Juani Cali MD   ketoconazole (NIZORAL) 2 % shampoo APPLY TO SCALP, LATHHER AND RINSE 2 TIMES WEEKLY Yes Juani Cali MD   vitamin B-12 (CYANOCOBALAMIN) 1000 MCG tablet Take 1,000 mcg by mouth daily Yes Historical Provider, MD   ketoconazole (NIZORAL) 2 % cream Apply topically daily. Yes Juani Cali MD   docusate sodium (COLACE) 100 MG capsule Take 1 capsule by mouth 2 times daily Yes Juani Cali MD   mometasone (ELOCON) 0.1 % ointment Apply topically daily.  Yes Juani Cali MD   polyethylene glycol Venida Bathe) powder Take 17 g by mouth daily Yes Historical Provider, MD Sebastian (Including outside providers/suppliers regularly involved in providing care):   Patient Care Team:  Juani Cali MD as PCP - General (Family Medicine)  Juani Cali MD as PCP - [facility] Empaneled Provider  Dandy Schulz (Orthopedic Surgery)  Pramod Torres DO (Dermatology)     Reviewed and updated this visit:  Tobacco  Allergies  Meds  Problems  Med Hx  Surg Hx  Soc Hx  Fam Hx

## 2023-03-09 DIAGNOSIS — N40.0 BENIGN PROSTATIC HYPERPLASIA, UNSPECIFIED WHETHER LOWER URINARY TRACT SYMPTOMS PRESENT: ICD-10-CM

## 2023-03-09 RX ORDER — OXYBUTYNIN CHLORIDE 10 MG/1
TABLET, EXTENDED RELEASE ORAL
Qty: 90 TABLET | Refills: 3 | Status: SHIPPED | OUTPATIENT
Start: 2023-03-09

## 2023-03-09 NOTE — TELEPHONE ENCOUNTER
Tyler Garcia is requesting a refill on the following medication(s):  Requested Prescriptions     Pending Prescriptions Disp Refills    oxybutynin (DITROPAN-XL) 10 MG extended release tablet [Pharmacy Med Name: OXYBUTYNIN CL ER 10 MG TABLET] 90 tablet 3     Sig: take 1 tablet by mouth once daily       Last Visit Date (If Applicable):  5/0/1379    Next Visit Date:    7/10/2023

## 2023-05-01 ENCOUNTER — OFFICE VISIT (OUTPATIENT)
Dept: FAMILY MEDICINE CLINIC | Age: 88
End: 2023-05-01
Payer: MEDICARE

## 2023-05-01 VITALS
DIASTOLIC BLOOD PRESSURE: 66 MMHG | OXYGEN SATURATION: 93 % | SYSTOLIC BLOOD PRESSURE: 114 MMHG | WEIGHT: 228 LBS | BODY MASS INDEX: 30.08 KG/M2 | HEART RATE: 68 BPM

## 2023-05-01 DIAGNOSIS — S00.81XA ABRASION OF FACE, INITIAL ENCOUNTER: ICD-10-CM

## 2023-05-01 DIAGNOSIS — I73.9 PERIPHERAL VASCULAR DISEASE (HCC): ICD-10-CM

## 2023-05-01 DIAGNOSIS — R60.0 LOWER EXTREMITY EDEMA: ICD-10-CM

## 2023-05-01 DIAGNOSIS — S80.811A ABRASION OF RIGHT LOWER EXTREMITY, INITIAL ENCOUNTER: Primary | ICD-10-CM

## 2023-05-01 PROCEDURE — 99212 OFFICE O/P EST SF 10 MIN: CPT | Performed by: FAMILY MEDICINE

## 2023-05-01 PROCEDURE — 99214 OFFICE O/P EST MOD 30 MIN: CPT | Performed by: FAMILY MEDICINE

## 2023-05-01 PROCEDURE — 1123F ACP DISCUSS/DSCN MKR DOCD: CPT | Performed by: FAMILY MEDICINE

## 2023-05-01 RX ORDER — KETOCONAZOLE 20 MG/ML
SHAMPOO TOPICAL
Qty: 120 ML | Refills: 3 | Status: SHIPPED | OUTPATIENT
Start: 2023-05-01

## 2023-05-01 RX ORDER — MUPIROCIN CALCIUM 20 MG/G
CREAM TOPICAL
Qty: 15 G | Refills: 1 | Status: SHIPPED | OUTPATIENT
Start: 2023-05-01

## 2023-05-01 SDOH — ECONOMIC STABILITY: FOOD INSECURITY: WITHIN THE PAST 12 MONTHS, THE FOOD YOU BOUGHT JUST DIDN'T LAST AND YOU DIDN'T HAVE MONEY TO GET MORE.: NEVER TRUE

## 2023-05-01 SDOH — ECONOMIC STABILITY: HOUSING INSECURITY
IN THE LAST 12 MONTHS, WAS THERE A TIME WHEN YOU DID NOT HAVE A STEADY PLACE TO SLEEP OR SLEPT IN A SHELTER (INCLUDING NOW)?: NO

## 2023-05-01 SDOH — ECONOMIC STABILITY: FOOD INSECURITY: WITHIN THE PAST 12 MONTHS, YOU WORRIED THAT YOUR FOOD WOULD RUN OUT BEFORE YOU GOT MONEY TO BUY MORE.: NEVER TRUE

## 2023-05-01 SDOH — ECONOMIC STABILITY: INCOME INSECURITY: HOW HARD IS IT FOR YOU TO PAY FOR THE VERY BASICS LIKE FOOD, HOUSING, MEDICAL CARE, AND HEATING?: NOT HARD AT ALL

## 2023-05-01 NOTE — TELEPHONE ENCOUNTER
Wendy Teran is requesting a refill on the following medication(s):  Requested Prescriptions     Pending Prescriptions Disp Refills    ketoconazole (NIZORAL) 2 % shampoo [Pharmacy Med Name: KETOCONAZOLE 2% SHAMPOO] 120 mL 3     Sig: APPLY TO SCALP, LATHER AND RINSE 2 TIMES WEEKLY       Last Visit Date (If Applicable):  9/6/2514    Next Visit Date:    5/1/2023

## 2023-05-01 NOTE — PROGRESS NOTES
[address] [facility]  [address]  Dept: [phone]  Dept [facility]:[phone]    Queen Med is a 81 y.o. male who presents today for his medical conditions/complaints as notedbelow. Queen Med is c/o of Leg Swelling (Right worse than left. /He had a physical at home with the insurance company and they told him he need to see a foot doctor. He does not know what they were concerned about. He does have a small scabbed area on the right shin. )    Assessment/Plan:     1. Abrasion of right lower extremity, initial encounter  -     mupirocin (BACTROBAN) 2 % cream; Apply topically 3 times daily. , Disp-15 g, R-1, Normal  2. Peripheral vascular disease (HCC)  -     mupirocin (BACTROBAN) 2 % cream; Apply topically 3 times daily. , Disp-15 g, R-1, Normal  3. Abrasion of face, initial encounter  -     mupirocin (BACTROBAN) 2 % cream; Apply topically 3 times daily. , Disp-15 g, R-1, Normal  4. Lower extremity edema    Discussed different options for compression socks on the legs. Will help with the chronic stasis changes and peripheral edema. Watch for nonhealing ulcerations. Frequent duration can use mupirocin topically 1-3 times daily. Very superficial and already healing so do not take this with 1 issue. Can also use it appears ulcerated region on the nasal bridge. Reviewed signs and symptoms of nonhealing. Did discuss evaluation with vascular for the peripheral or vascular changes but check would like to defer unless absolutely needed. Can continue with the [facility] center nail trimming.   Heavy moisturizer daily for the chronic skin changes    Lab Results   Component Value Date    WBC 5.4 09/07/2022    HGB 13.5 (L) 09/07/2022    HCT 45.9 09/07/2022    .7 09/07/2022    CHOL 158 09/07/2022    TRIG 60 09/07/2022    HDL 53 09/07/2022    ALT 17 09/07/2022    AST 28 09/07/2022     09/07/2022    K 4.6 09/07/2022     09/07/2022

## 2023-05-16 ENCOUNTER — TELEPHONE (OUTPATIENT)
Dept: FAMILY MEDICINE CLINIC | Age: 88
End: 2023-05-16
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
## 2023-05-16 DIAGNOSIS — R07.89 LEFT-SIDED CHEST WALL PAIN: Primary | ICD-10-CM

## 2023-05-16 NOTE — TELEPHONE ENCOUNTER
Patient said he talked to you about having a chest x-ray. He will be working in [place] [employer] on Wednesday 5/17. Let him know when order is ready. Has f/u appt 5/18 for the chest pain.

## 2023-05-18 ENCOUNTER — OFFICE VISIT (OUTPATIENT)
Dept: FAMILY MEDICINE CLINIC | Age: 88
End: 2023-05-18
Payer: MEDICARE

## 2023-05-18 VITALS
WEIGHT: 222 LBS | DIASTOLIC BLOOD PRESSURE: 78 MMHG | SYSTOLIC BLOOD PRESSURE: 138 MMHG | HEART RATE: 56 BPM | BODY MASS INDEX: 29.29 KG/M2 | OXYGEN SATURATION: 96 %

## 2023-05-18 DIAGNOSIS — R07.89 LEFT-SIDED CHEST WALL PAIN: Primary | ICD-10-CM

## 2023-05-18 DIAGNOSIS — I73.9 PERIPHERAL VASCULAR DISEASE (HCC): ICD-10-CM

## 2023-05-18 PROCEDURE — 1123F ACP DISCUSS/DSCN MKR DOCD: CPT | Performed by: FAMILY MEDICINE

## 2023-05-18 PROCEDURE — 99213 OFFICE O/P EST LOW 20 MIN: CPT | Performed by: FAMILY MEDICINE

## 2023-05-18 PROCEDURE — 99211 OFF/OP EST MAY X REQ PHY/QHP: CPT | Performed by: FAMILY MEDICINE

## 2023-05-23 DIAGNOSIS — N40.0 BENIGN PROSTATIC HYPERPLASIA, UNSPECIFIED WHETHER LOWER URINARY TRACT SYMPTOMS PRESENT: ICD-10-CM

## 2023-05-24 NOTE — TELEPHONE ENCOUNTER
Sailaja Lee is requesting a refill on the following medication(s):  Requested Prescriptions     Pending Prescriptions Disp Refills    oxybutynin (DITROPAN-XL) 10 MG extended release tablet [Pharmacy Med Name: OXYBUTYNIN CHLORIDE ER TABS 10MG] 90 tablet 3     Sig: TAKE 1 TABLET DAILY       Last Visit Date (If Applicable):  8/07/1506    Next Visit Date:    7/10/2023

## 2023-05-25 RX ORDER — OXYBUTYNIN CHLORIDE 10 MG/1
TABLET, EXTENDED RELEASE ORAL
Qty: 90 TABLET | Refills: 3 | Status: SHIPPED | OUTPATIENT
Start: 2023-05-25

## 2023-05-30 ENCOUNTER — TELEPHONE (OUTPATIENT)
Dept: FAMILY MEDICINE CLINIC | Age: 88
End: 2023-05-30

## 2023-06-02 ENCOUNTER — OFFICE VISIT (OUTPATIENT)
Dept: FAMILY MEDICINE CLINIC | Age: 88
End: 2023-06-02

## 2023-06-02 VITALS
SYSTOLIC BLOOD PRESSURE: 130 MMHG | HEART RATE: 58 BPM | BODY MASS INDEX: 29.29 KG/M2 | DIASTOLIC BLOOD PRESSURE: 68 MMHG | WEIGHT: 222 LBS | OXYGEN SATURATION: 94 %

## 2023-06-02 DIAGNOSIS — S01.111A LACERATION OF EYELID OF RIGHT EYE WITHOUT FOREIGN BODY, INITIAL ENCOUNTER: ICD-10-CM

## 2023-06-02 DIAGNOSIS — R55 NEAR SYNCOPE: ICD-10-CM

## 2023-06-02 DIAGNOSIS — S06.0X0A CONCUSSION WITHOUT LOSS OF CONSCIOUSNESS, INITIAL ENCOUNTER: ICD-10-CM

## 2023-06-02 DIAGNOSIS — I10 ESSENTIAL HYPERTENSION: ICD-10-CM

## 2023-06-02 DIAGNOSIS — Z09 HOSPITAL DISCHARGE FOLLOW-UP: Primary | ICD-10-CM

## 2023-06-02 RX ORDER — ASPIRIN 81 MG/1
81 TABLET, COATED ORAL DAILY
COMMUNITY
Start: 2023-05-27

## 2023-06-02 RX ORDER — LISINOPRIL 20 MG/1
20 TABLET ORAL DAILY
COMMUNITY
Start: 2023-05-27

## 2023-06-05 LAB
ANION GAP SERPL CALCULATED.3IONS-SCNC: 2.7 MMOL/L
BUN BLDV-MCNC: 30 MG/DL (ref 9–20)
CALCIUM SERPL-MCNC: 8.8 MG/DL (ref 8.4–10.2)
CHLORIDE BLD-SCNC: 109 MMOL/L (ref 98–120)
CO2: 31 MMOL/L (ref 22–31)
CREAT SERPL-MCNC: 0.9 MG/DL (ref 0.7–1.3)
GFR CALCULATED: > 60
GLUCOSE: 84 MG/DL (ref 75–110)
POTASSIUM SERPL-SCNC: 4.3 MMOL/L (ref 3.6–5)
SODIUM BLD-SCNC: 143 MMOL/L (ref 135–145)

## 2023-06-15 DIAGNOSIS — R55 NEAR SYNCOPE: ICD-10-CM

## 2023-06-22 DIAGNOSIS — K29.01 GASTROINTESTINAL HEMORRHAGE ASSOCIATED WITH ACUTE GASTRITIS: ICD-10-CM

## 2023-06-22 RX ORDER — PANTOPRAZOLE SODIUM 20 MG/1
TABLET, DELAYED RELEASE ORAL
Qty: 90 TABLET | Refills: 3 | Status: SHIPPED | OUTPATIENT
Start: 2023-06-22

## 2023-06-22 NOTE — TELEPHONE ENCOUNTER
Arben Parish is requesting a refill on the following medication(s):  Requested Prescriptions     Pending Prescriptions Disp Refills    pantoprazole (PROTONIX) 20 MG tablet [Pharmacy Med Name: PANTOPRAZOLE SOD DR 20 MG TAB] 90 tablet 3     Sig: take 1 tablet by mouth once daily       Last Visit Date (If Applicable):  9/1/8966    Next Visit Date:    7/10/2023

## 2023-06-28 DIAGNOSIS — K29.01 GASTROINTESTINAL HEMORRHAGE ASSOCIATED WITH ACUTE GASTRITIS: ICD-10-CM

## 2023-06-28 RX ORDER — PANTOPRAZOLE SODIUM 20 MG/1
TABLET, DELAYED RELEASE ORAL
Qty: 90 TABLET | Refills: 3 | OUTPATIENT
Start: 2023-06-28

## 2023-07-10 ENCOUNTER — OFFICE VISIT (OUTPATIENT)
Dept: FAMILY MEDICINE CLINIC | Age: 88
End: 2023-07-10
Payer: MEDICARE

## 2023-07-10 VITALS
OXYGEN SATURATION: 95 % | BODY MASS INDEX: 29.16 KG/M2 | HEART RATE: 63 BPM | DIASTOLIC BLOOD PRESSURE: 84 MMHG | SYSTOLIC BLOOD PRESSURE: 136 MMHG | WEIGHT: 221 LBS

## 2023-07-10 DIAGNOSIS — I10 ESSENTIAL HYPERTENSION: Primary | ICD-10-CM

## 2023-07-10 DIAGNOSIS — G44.86 CERVICOGENIC HEADACHE: ICD-10-CM

## 2023-07-10 PROCEDURE — 99214 OFFICE O/P EST MOD 30 MIN: CPT | Performed by: FAMILY MEDICINE

## 2023-07-10 PROCEDURE — 1123F ACP DISCUSS/DSCN MKR DOCD: CPT | Performed by: FAMILY MEDICINE

## 2023-07-10 PROCEDURE — 99211 OFF/OP EST MAY X REQ PHY/QHP: CPT | Performed by: FAMILY MEDICINE

## 2023-07-10 NOTE — PROGRESS NOTES
06/12/23 138/72   06/02/23 130/68          (goal 120/80)    Wt Readings from Last 3 Encounters:   07/10/23 221 lb (100.2 kg)   06/02/23 222 lb (100.7 kg)   05/18/23 222 lb (100.7 kg)        Past Medical History:   Diagnosis Date    C2 cervical fracture (HCC)     Cancer (HCC)     skin    Enlarged prostate     Hyperlipidemia     Thrombocytopenia (HCC)     borderline episodic      Past Surgical History:   Procedure Laterality Date    CERVICAL SPINE SURGERY  03/29/2016    [facility] C2 Fracture    COLONOSCOPY  09/2016    [facility]    HIP SURGERY Right     ORIF after fall on the ice    HIP SURGERY Right 01/2015    UPPER GASTROINTESTINAL ENDOSCOPY  06/03/2019       Family History   Problem Relation Age of Onset    Stroke Father     Coronary Art Dis Father     Stroke Sister        Social History     Tobacco Use    Smoking status: Never    Smokeless tobacco: Never   Substance Use Topics    Alcohol use: Yes     Comment: occasionally      Current Outpatient Medications   Medication Sig Dispense Refill    pantoprazole (PROTONIX) 20 MG tablet take 1 tablet by mouth once daily 90 tablet 3    ASPIRIN LOW DOSE 81 MG EC tablet Take 1 tablet by mouth daily      lisinopril (PRINIVIL;ZESTRIL) 20 MG tablet Take 1 tablet by mouth daily      oxybutynin (DITROPAN-XL) 10 MG extended release tablet TAKE 1 TABLET DAILY 90 tablet 3    ketoconazole (NIZORAL) 2 % shampoo APPLY TO SCALP, LATHER AND RINSE 2 TIMES WEEKLY 120 mL 3    mupirocin (BACTROBAN) 2 % cream Apply topically 3 times daily.  15 g 1    loratadine (CLARITIN) 10 MG tablet Take 1 tablet by mouth daily      famotidine (PEPCID) 20 MG tablet Take 1 tablet by mouth 2 times daily      atorvastatin (LIPITOR) 20 MG tablet take 1 tablet by mouth at bedtime 90 tablet 3    acetaminophen (TYLENOL) 500 MG tablet every 24 hours      diclofenac sodium (VOLTAREN) 1 % GEL Apply topically 2 times daily      ferrous sulfate (SLOW FE) 142 (45 Fe) MG extended release tablet Take 142 mg by mouth daily 30

## 2023-08-02 ENCOUNTER — OFFICE VISIT (OUTPATIENT)
Dept: FAMILY MEDICINE CLINIC | Age: 88
End: 2023-08-02
Payer: MEDICARE

## 2023-08-02 VITALS
SYSTOLIC BLOOD PRESSURE: 136 MMHG | WEIGHT: 222 LBS | HEART RATE: 55 BPM | OXYGEN SATURATION: 95 % | DIASTOLIC BLOOD PRESSURE: 82 MMHG | BODY MASS INDEX: 29.29 KG/M2

## 2023-08-02 DIAGNOSIS — S39.011A STRAIN OF ABDOMINAL MUSCLE, INITIAL ENCOUNTER: Primary | ICD-10-CM

## 2023-08-02 PROCEDURE — 1123F ACP DISCUSS/DSCN MKR DOCD: CPT | Performed by: FAMILY MEDICINE

## 2023-08-02 PROCEDURE — 99212 OFFICE O/P EST SF 10 MIN: CPT | Performed by: FAMILY MEDICINE

## 2023-08-02 PROCEDURE — 99213 OFFICE O/P EST LOW 20 MIN: CPT | Performed by: FAMILY MEDICINE

## 2023-08-02 NOTE — PROGRESS NOTES
but occasionally words are mis-transcribed.)    Electronically signed by Lo Monterroso MD on 8/2/2023

## 2023-09-05 RX ORDER — ATORVASTATIN CALCIUM 20 MG/1
TABLET, FILM COATED ORAL
Qty: 90 TABLET | Refills: 3 | Status: SHIPPED | OUTPATIENT
Start: 2023-09-05

## 2023-09-05 NOTE — TELEPHONE ENCOUNTER
Gaby Aguilar is requesting a refill on the following medication(s):  Requested Prescriptions     Pending Prescriptions Disp Refills    atorvastatin (LIPITOR) 20 MG tablet [Pharmacy Med Name: ATORVASTATIN 20 MG TABLET] 90 tablet 3     Sig: take 1 tablet by mouth at bedtime       Last Visit Date (If Applicable):  2/8/6415    Next Visit Date:    1/12/2024

## 2023-09-07 ENCOUNTER — OFFICE VISIT (OUTPATIENT)
Dept: FAMILY MEDICINE CLINIC | Age: 88
End: 2023-09-07
Payer: MEDICARE

## 2023-09-07 VITALS
WEIGHT: 220 LBS | OXYGEN SATURATION: 92 % | BODY MASS INDEX: 29.03 KG/M2 | DIASTOLIC BLOOD PRESSURE: 62 MMHG | SYSTOLIC BLOOD PRESSURE: 126 MMHG | HEART RATE: 57 BPM

## 2023-09-07 DIAGNOSIS — R26.9 ABNORMALITY OF GAIT: ICD-10-CM

## 2023-09-07 DIAGNOSIS — M54.50 ACUTE LEFT-SIDED LOW BACK PAIN WITHOUT SCIATICA: Primary | ICD-10-CM

## 2023-09-07 PROCEDURE — 99211 OFF/OP EST MAY X REQ PHY/QHP: CPT | Performed by: FAMILY MEDICINE

## 2023-09-07 PROCEDURE — 1123F ACP DISCUSS/DSCN MKR DOCD: CPT | Performed by: FAMILY MEDICINE

## 2023-09-07 PROCEDURE — 99214 OFFICE O/P EST MOD 30 MIN: CPT | Performed by: FAMILY MEDICINE

## 2023-09-07 NOTE — PROGRESS NOTES
[address] [facility]  [address], [address] [facility] [place], [address]  Dept: [phone]  Dept WOP:[phone]    Lalitha Caputo is a 81 y.o. male who presents today for his medical conditions/complaints as notedbelow. Lalitha Caputo is c/o of Back Pain (Pt states he started going to PT as Dr. Sidonie Libman suggested. Pt states that the pain started about a week ago in his lower back only on the left side. Pt states he went to PT this morning and then had a massage to see if that would help and it did not. Pt states he thinks he may have pulled a muscle and only started to notice lower back pain since starting PT  )        Assessment/Plan:     1. Acute left-sided low back pain without sciatica  -     [facility]  2. Abnormality of gait  -     [facility]      Continue with the therapy for the neck and shoulder pain. Continue with the acetaminophen as needed for the pain as well. Can use the heating pad for the pain. Add the low back to the therapy order. If not improving with this would consider injections with pain management for the lower back or even think about a medication to help with the pain    Lab Results   Component Value Date    WBC 3.8 (L) 05/27/2023    HGB 13.6 (L) 05/27/2023    HCT 41.9 (L) 05/27/2023    .7 (L) 05/27/2023    CHOL 148 05/27/2023    TRIG 54 05/27/2023    HDL 53 05/27/2023    ALT 17 09/07/2022    AST 28 09/07/2022     06/05/2023    K 4.3 06/05/2023     06/05/2023    CREATININE 0.9 06/05/2023    BUN 30 (H) 06/05/2023    CO2 31 06/05/2023    INR 1.2 05/21/2022    LABA1C 5.4 05/27/2023       Return for Medication recheck. Subjective:      HPI:     HPI  Back Pain (Pt states he started going to PT as Dr. Sidonie Libman suggested. Pt states that the pain started about a week ago in his lower back only on the left side.  Pt states he went to PT this morning and then had a massage to see if

## 2023-09-07 NOTE — PATIENT INSTRUCTIONS
Continue with the therapy for the neck and shoulder pain. Continue with the acetaminophen as needed for the pain as well. Can use the heating pad for the pain. Add the low back to the therapy order.   If not improving with this would consider injections with pain management for the lower back or even think about a medication to help with the pain

## 2023-09-15 ENCOUNTER — HOSPITAL ENCOUNTER (OUTPATIENT)
Age: 88
Setting detail: SPECIMEN
Discharge: HOME | End: 2023-09-15
Payer: MEDICARE

## 2023-09-15 ENCOUNTER — OFFICE VISIT (OUTPATIENT)
Dept: FAMILY MEDICINE CLINIC | Age: 88
End: 2023-09-15
Payer: MEDICARE

## 2023-09-15 VITALS
RESPIRATION RATE: 16 BRPM | TEMPERATURE: 97.1 F | HEART RATE: 74 BPM | SYSTOLIC BLOOD PRESSURE: 130 MMHG | DIASTOLIC BLOOD PRESSURE: 66 MMHG | OXYGEN SATURATION: 95 % | WEIGHT: 215.4 LBS | HEIGHT: 69 IN | BODY MASS INDEX: 31.9 KG/M2

## 2023-09-15 DIAGNOSIS — R30.0 DYSURIA: ICD-10-CM

## 2023-09-15 DIAGNOSIS — R35.0 URINARY FREQUENCY: Primary | ICD-10-CM

## 2023-09-15 DIAGNOSIS — R35.0 URINARY FREQUENCY: ICD-10-CM

## 2023-09-15 LAB
BILIRUBIN, POC: NORMAL
BLOOD URINE, POC: NORMAL
CLARITY, POC: CLEAR
COLOR, POC: YELLOW
GLUCOSE URINE, POC: NORMAL
KETONES, POC: NORMAL
LEUKOCYTE EST, POC: NORMAL
NITRITE, POC: NORMAL
PH, POC: 5.5
PROTEIN, POC: NORMAL
SPECIFIC GRAVITY, POC: 1.03
UROBILINOGEN, POC: 0.2

## 2023-09-15 PROCEDURE — 81002 URINALYSIS NONAUTO W/O SCOPE: CPT | Performed by: NURSE PRACTITIONER

## 2023-09-15 PROCEDURE — 87086 URINE CULTURE/COLONY COUNT: CPT

## 2023-09-15 PROCEDURE — 99213 OFFICE O/P EST LOW 20 MIN: CPT | Performed by: NURSE PRACTITIONER

## 2023-09-15 PROCEDURE — 1123F ACP DISCUSS/DSCN MKR DOCD: CPT | Performed by: NURSE PRACTITIONER

## 2023-09-15 PROCEDURE — 99212 OFFICE O/P EST SF 10 MIN: CPT | Performed by: NURSE PRACTITIONER

## 2023-09-15 PROCEDURE — PBSHW POCT URINALYSIS DIPSTICK: Performed by: NURSE PRACTITIONER

## 2023-09-15 RX ORDER — CIPROFLOXACIN 500 MG/1
500 TABLET, FILM COATED ORAL 2 TIMES DAILY
Qty: 14 TABLET | Refills: 0 | Status: SHIPPED | OUTPATIENT
Start: 2023-09-15 | End: 2023-09-22

## 2023-09-15 RX ORDER — TAMSULOSIN HYDROCHLORIDE 0.4 MG/1
0.4 CAPSULE ORAL DAILY
Qty: 30 CAPSULE | Refills: 0 | Status: SHIPPED | OUTPATIENT
Start: 2023-09-15

## 2023-09-15 ASSESSMENT — PATIENT HEALTH QUESTIONNAIRE - PHQ9
SUM OF ALL RESPONSES TO PHQ QUESTIONS 1-9: 0
SUM OF ALL RESPONSES TO PHQ QUESTIONS 1-9: 0
1. LITTLE INTEREST OR PLEASURE IN DOING THINGS: 0
SUM OF ALL RESPONSES TO PHQ QUESTIONS 1-9: 0
2. FEELING DOWN, DEPRESSED OR HOPELESS: 0
SUM OF ALL RESPONSES TO PHQ QUESTIONS 1-9: 0
SUM OF ALL RESPONSES TO PHQ9 QUESTIONS 1 & 2: 0

## 2023-09-15 ASSESSMENT — ENCOUNTER SYMPTOMS
ABDOMINAL PAIN: 0
VOMITING: 0
NAUSEA: 0
DIARRHEA: 0
CONSTIPATION: 0

## 2023-09-15 NOTE — PATIENT INSTRUCTIONS
Start the cipro as directed in case of infection. We will send the urine for culture to check for infection. Start flomax to improve urine flow. We will move up appointment to see DR. Groves.

## 2023-09-15 NOTE — PROGRESS NOTES
results. Start flomax to improve urine flow. We will move up appointment to see DR. Groves. Patient Instructions   Start the cipro as directed in case of infection. We will send the urine for culture to check for infection. Start flomax to improve urine flow. We will move up appointment to see DR. Groves. Patient/Caregiver instructed on use, benefit, and side effects of prescribed medications. All patient/parent/caregiver questions answered. Patient/parent/caregiver voiced understanding. Reviewed health maintenance. Instructed to continue current medications, diet and exercise. Patient agreed with treatment plan. Follow up as directed.            Electronically signed by PIERER Machuca NP on9/15/2023

## 2023-09-16 LAB
MICROORGANISM SPEC CULT: NO GROWTH
SPECIMEN DESCRIPTION: NORMAL

## 2023-09-18 ENCOUNTER — OFFICE VISIT (OUTPATIENT)
Dept: FAMILY MEDICINE CLINIC | Age: 88
End: 2023-09-18
Payer: MEDICARE

## 2023-09-18 VITALS
WEIGHT: 218 LBS | BODY MASS INDEX: 32.19 KG/M2 | DIASTOLIC BLOOD PRESSURE: 64 MMHG | SYSTOLIC BLOOD PRESSURE: 138 MMHG | OXYGEN SATURATION: 96 % | HEART RATE: 76 BPM

## 2023-09-18 DIAGNOSIS — M48.02 CERVICAL STENOSIS OF SPINAL CANAL: ICD-10-CM

## 2023-09-18 DIAGNOSIS — G44.86 CERVICOGENIC HEADACHE: ICD-10-CM

## 2023-09-18 DIAGNOSIS — R29.898 LEFT HAND WEAKNESS: Primary | ICD-10-CM

## 2023-09-18 DIAGNOSIS — R35.0 URINARY FREQUENCY: ICD-10-CM

## 2023-09-18 DIAGNOSIS — D69.6 THROMBOCYTOPENIA (HCC): ICD-10-CM

## 2023-09-18 DIAGNOSIS — G45.9 TIA (TRANSIENT ISCHEMIC ATTACK): ICD-10-CM

## 2023-09-18 PROCEDURE — 99211 OFF/OP EST MAY X REQ PHY/QHP: CPT | Performed by: FAMILY MEDICINE

## 2023-09-18 PROCEDURE — 99215 OFFICE O/P EST HI 40 MIN: CPT | Performed by: FAMILY MEDICINE

## 2023-09-18 PROCEDURE — 1123F ACP DISCUSS/DSCN MKR DOCD: CPT | Performed by: FAMILY MEDICINE

## 2023-09-18 RX ORDER — TAMSULOSIN HYDROCHLORIDE 0.4 MG/1
0.4 CAPSULE ORAL DAILY
Qty: 90 CAPSULE | Refills: 0 | Status: SHIPPED | OUTPATIENT
Start: 2023-09-18

## 2023-09-18 RX ORDER — ASPIRIN 81 MG/1
81 TABLET ORAL DAILY
Qty: 30 TABLET | Refills: 5
Start: 2023-09-18

## 2023-09-18 NOTE — PROGRESS NOTES
[address] [facility]  [address], [address] [facility], [address]  Dept: [phone]  Dept [facility]:[phone]    Aris Villagran is a 81 y.o. male who presents today for his medical conditions/complaints as notedbelow. Aris Villagran is c/o of Extremity Weakness (Pt states that yesterday morning he went to use his cane with his left hand and he did not have the strength in his left hand/arm. Pt states today it does seem to be better and he is able to use his left hand/arm. Pt states he has not SOB or chest pain yesterday or today.) and Cough (Pt states he has had a cough for about 3 weeks. Pt states he has no other symptoms. )        Assessment/Plan:     1. Left hand weakness  -     aspirin 81 MG EC tablet; Take 1 tablet by mouth daily, Disp-30 tablet, R-5Adjust Sig  -     EMG; Future  2. Cervical stenosis of spinal canal  3. Thrombocytopenia (HCC)  4. Cervicogenic headache      This initial symptoms were certainly concerning for possible CVA. In light of the more detailed history and exam today I suspect this was actually more likely related to nerve impingement from the severe cervical spine disease. Symptoms of numbness tingling which originated after sleeping in an unusual position and resolved with neck manipulation in the dental chair again support this history. Evaluation has thus far been negative. Recent platelet count has been reassuring hemoglobin has been stable. There was a slight drop but likely related to his recent blood donation. We will restart his baby aspirin as a precaution and continue with pain management evaluation. Will go ahead and also get the holter monitor to rule out intermittent afib as well.      Lab Results   Component Value Date    WBC 4.6 (L) 09/17/2023    HGB 12.2 (L) 09/17/2023    HCT 41.3 (L) 09/17/2023    .2 (L) 09/17/2023    CHOL 148 05/27/2023    TRIG 54 05/27/2023    HDL 53 05/27/2023    ALT 18 09/17/2023    AST 27 09/17/2023

## 2023-09-18 NOTE — TELEPHONE ENCOUNTER
Rx was sent over for 30 days, pharmacy is requesting 90 days.      Travon Cunha is requesting a refill on the following medication(s):  Requested Prescriptions     Pending Prescriptions Disp Refills    tamsulosin (FLOMAX) 0.4 MG capsule 90 capsule 0     Sig: Take 1 capsule by mouth daily       Last Visit Date (If Applicable):  8/91/6041    Next Visit Date:    Visit date not found

## 2023-09-25 ENCOUNTER — TELEPHONE (OUTPATIENT)
Dept: FAMILY MEDICINE CLINIC | Age: 88
End: 2023-09-25

## 2023-09-25 DIAGNOSIS — I71.40 ABDOMINAL AORTIC ANEURYSM (AAA) 3.0 CM TO 5.5 CM IN DIAMETER IN MALE (HCC): Primary | ICD-10-CM

## 2023-09-25 NOTE — TELEPHONE ENCOUNTER
Pt had a recent MR of lumbar spine and report found incidental finding of Abdominal Aortic Aneurysm that they wanted addressed by pcp.

## 2023-09-27 ENCOUNTER — OFFICE VISIT (OUTPATIENT)
Dept: UROLOGY | Age: 88
End: 2023-09-27
Payer: MEDICARE

## 2023-09-27 VITALS
DIASTOLIC BLOOD PRESSURE: 76 MMHG | WEIGHT: 218 LBS | HEART RATE: 55 BPM | RESPIRATION RATE: 16 BRPM | OXYGEN SATURATION: 90 % | HEIGHT: 69 IN | BODY MASS INDEX: 32.29 KG/M2 | SYSTOLIC BLOOD PRESSURE: 114 MMHG

## 2023-09-27 DIAGNOSIS — N40.1 HYPERTROPHY OF PROSTATE WITH URINARY OBSTRUCTION: Primary | ICD-10-CM

## 2023-09-27 DIAGNOSIS — N28.89 RENAL MASS: ICD-10-CM

## 2023-09-27 DIAGNOSIS — N13.8 HYPERTROPHY OF PROSTATE WITH URINARY OBSTRUCTION: Primary | ICD-10-CM

## 2023-09-27 DIAGNOSIS — N43.3 HYDROCELE, UNSPECIFIED HYDROCELE TYPE: ICD-10-CM

## 2023-09-27 DIAGNOSIS — N50.89 SCROTAL SWELLING: ICD-10-CM

## 2023-09-27 DIAGNOSIS — R39.15 URGENCY OF URINATION: ICD-10-CM

## 2023-09-27 PROCEDURE — 99214 OFFICE O/P EST MOD 30 MIN: CPT | Performed by: UROLOGY

## 2023-09-27 PROCEDURE — 1123F ACP DISCUSS/DSCN MKR DOCD: CPT | Performed by: UROLOGY

## 2023-09-27 NOTE — PROGRESS NOTES
Dr. Alexandre Arizmendi MD MD  [facility] Consultation / FU Visit    Patient:  Giovanna Nagy  YOB: 1935  Date: 9/27/2023  Consult requested from Colleen Cagle MD     HISTORY OF PRESENT ILLNESS:   The patient is a 81 y.o. male who presents today for follow-up for the following problem(s): right hydrocele  Overall the problem(s) : are worsening. Associated Symptoms: No dysuria, gross hematuria. Pain Severity:      Today visit:   9/27/23   Post os s/p greenlight pvp for BPH, doing well - no hematuria. Nocturia improved on Oxybutynin 10 mg QHS, 2-3 times. AUASS: 10/2.    - worsening incontinence, started on tamsulosin    6/23/21  Today we discuss BPH with LUTs, and his findings of enlarged prostate with complex features. He is interested in [place]. We have a discussion with him and Dr. Vinny Reeves, who is present in the room. Risks benefits and alternative procedures are explained, informed consent is obtained, and the patient elects to proceed. 4/7/21   Delwyn Closs follows with us for a right sided hydrocele, and a renal cyst with complex features. The cyst is small and appears stable on serial imaging. GAEL is reviewed, and the mass measure 2.5 cm. The He states the hydrocele is  no longer bothersome to him,  This actually appears to be a hernia of the right inguinal canal, which has been repaired and improved. BPH with LUTs appears to be controlled on Proscar. AUASS: 7/2. His urinary frequency and urgency are controlled on Oxybutynin. He states he has urinary incontinence is worsening despite medical therapy. 3/31/20 - this visit was performed with a video teleconference.   Doing well   Had a CT scan - multiple renal cysts and a hyperdense cyst  GAEL - demonstrated a renal cyst with solid component, consistent with hyperdense cysto demonstrated on CT scan  Pt asymptomatic  Denies hematuria    3/4/20  Hydrocele is improving, smaller size  - possibly reactive after

## 2023-09-29 NOTE — TELEPHONE ENCOUNTER
Reviewed with patient at his recent follow up in the office. Due to the smaller size of the aneurysm this can be followed with regular scheduled ultrasound- it is asymptomatic. Discussed with the patient and will do an USN in the late winter / early spring and every 6-12 months after that.

## 2023-10-23 ENCOUNTER — TELEPHONE (OUTPATIENT)
Dept: FAMILY MEDICINE CLINIC | Age: 88
End: 2023-10-23

## 2023-10-23 DIAGNOSIS — R29.898 LEFT HAND WEAKNESS: ICD-10-CM

## 2023-10-23 DIAGNOSIS — R00.1 BRADYCARDIA: Primary | ICD-10-CM

## 2023-10-23 NOTE — TELEPHONE ENCOUNTER
Received a letter from [address] [place] [address] Olga Lidia regarding getting a pneumococcal vaccine. Wants to know if he is due for this or any other vaccines? Also would like to know about the results of his Holter Monitor.

## 2023-10-23 NOTE — TELEPHONE ENCOUNTER
No need for the pneumonia booster-- he is up to date    The Holter showed slow heart rate but no atrial fibrillation. Would have him see cardiology, Tabith[facility] cardiology. Did show mild heart block not in and of itself concerning. With the episode will see what cardiology thinks to be sure.

## 2023-11-01 DIAGNOSIS — I71.40 ABDOMINAL AORTIC ANEURYSM (AAA) 3.0 CM TO 5.5 CM IN DIAMETER IN MALE (HCC): ICD-10-CM

## 2023-11-02 DIAGNOSIS — R29.898 LEFT HAND WEAKNESS: ICD-10-CM

## 2023-11-15 NOTE — RESULT ENCOUNTER NOTE
Notify the patient normal results of the EMG.  At this time I would consider Ortho referral if the hand numbness persists,

## 2023-12-27 ENCOUNTER — TELEPHONE (OUTPATIENT)
Dept: UROLOGY | Age: 88
End: 2023-12-27

## 2023-12-27 NOTE — TELEPHONE ENCOUNTER
Patient called stating he has an appointment with Arnol Almodovar on 1/03/24, and he is asking if he can come around 11:00. Writer did not see an appointment for patient. Can contact patient at # [phone].

## 2023-12-31 DIAGNOSIS — R35.0 URINARY FREQUENCY: ICD-10-CM

## 2024-01-02 ENCOUNTER — OFFICE VISIT (OUTPATIENT)
Dept: FAMILY MEDICINE CLINIC | Age: 89
End: 2024-01-02
Payer: MEDICARE

## 2024-01-02 VITALS
WEIGHT: 211 LBS | HEART RATE: 84 BPM | OXYGEN SATURATION: 90 % | BODY MASS INDEX: 31.16 KG/M2 | DIASTOLIC BLOOD PRESSURE: 80 MMHG | SYSTOLIC BLOOD PRESSURE: 136 MMHG

## 2024-01-02 DIAGNOSIS — R35.0 URINARY FREQUENCY: ICD-10-CM

## 2024-01-02 DIAGNOSIS — I10 ESSENTIAL HYPERTENSION: ICD-10-CM

## 2024-01-02 DIAGNOSIS — R07.89 ATYPICAL CHEST PAIN: Primary | ICD-10-CM

## 2024-01-02 DIAGNOSIS — I44.0 FIRST DEGREE AV BLOCK: ICD-10-CM

## 2024-01-02 PROCEDURE — 99214 OFFICE O/P EST MOD 30 MIN: CPT | Performed by: FAMILY MEDICINE

## 2024-01-02 PROCEDURE — 1123F ACP DISCUSS/DSCN MKR DOCD: CPT | Performed by: FAMILY MEDICINE

## 2024-01-02 PROCEDURE — 99212 OFFICE O/P EST SF 10 MIN: CPT | Performed by: FAMILY MEDICINE

## 2024-01-02 RX ORDER — TAMSULOSIN HYDROCHLORIDE 0.4 MG/1
0.4 CAPSULE ORAL DAILY
Qty: 90 CAPSULE | Refills: 0 | OUTPATIENT
Start: 2024-01-02

## 2024-01-02 RX ORDER — TAMSULOSIN HYDROCHLORIDE 0.4 MG/1
0.4 CAPSULE ORAL DAILY
Qty: 90 CAPSULE | Refills: 1 | Status: SHIPPED | OUTPATIENT
Start: 2024-01-02

## 2024-01-02 ASSESSMENT — PATIENT HEALTH QUESTIONNAIRE - PHQ9
SUM OF ALL RESPONSES TO PHQ QUESTIONS 1-9: 0
2. FEELING DOWN, DEPRESSED OR HOPELESS: 0
SUM OF ALL RESPONSES TO PHQ QUESTIONS 1-9: 0
1. LITTLE INTEREST OR PLEASURE IN DOING THINGS: 0
SUM OF ALL RESPONSES TO PHQ9 QUESTIONS 1 & 2: 0

## 2024-01-02 NOTE — TELEPHONE ENCOUNTER
Aguila Mccarthy is requesting a refill on the following medication(s):  Requested Prescriptions     Pending Prescriptions Disp Refills    tamsulosin (FLOMAX) 0.4 MG capsule 90 capsule 1     Sig: Take 1 capsule by mouth daily       Last Visit Date (If Applicable):  9/15/2023    Next Visit Date:    Visit date not found

## 2024-01-02 NOTE — PATIENT INSTRUCTIONS
Check our medication list against what you are taking at home.  Let us know if this is not correct.    Make sure you take the actual pill bottle with you to the cardiology appointment to make sure they have it correct in their records.

## 2024-01-02 NOTE — PROGRESS NOTES
[facility]  [address] [place] [address]  Phone: [phone]  Fax: [phone]      Date of Visit:  24   Patient Name: Aguila Mccarthy   Patient :  1935     HPI:     Aguila Mccarthy is a 88 y.o. male with   Patient Active Problem List   Diagnosis    Essential hypertension    Hyperlipemia    Thrombocytopenia (HCC)    Benign prostatic hyperplasia    Erectile dysfunction    Abnormality of gait    Left renal mass    Peripheral vascular disease (HCC)    Lower extremity edema     who presents today to discuss   Chief Complaint   Patient presents with    ED Follow-up     Pt states that he was having chest pain and went to [facility] ER. Pt states that while he was at the ER his BP was in the 200s. Pt states that he was in the ER on 23. Pt states that his son checked his BP at home om  23 and BP was 134/76 and 24 BP was 132/80       Recent labwork:  Orders Only on 2023   Component Date Value Ref Range Status    WBC 2023 4.0 (L)  4.8 - 10.8 Thou/mL3 Final    RBC 2023 4.25 (L)  4.70 - 6.10 M/uL Final    Hemoglobin 2023 13.5 (L)  13.8 - 17.8 Final    Hematocrit 2023 43.6  42.0 - 52.0 % Final    MCV 2023 102.6 (H)  80.0 - 94.0 fl Final    MCH 2023 31.8  28.5 - 32.5 pg Final    MCHC 2023 31.0 (L)  32.0 - 37.0 g/dL Final    RDW 2023 11.7  10.0 - 15.5 % Final    Platelets 2023 129.7 (L)  130 - 400 Thou/mm3 Final    Sodium 2023 140  135 - 145 mmol/L Final    Potassium 2023 4.5  3.6 - 5.0 mmol/L Final    Chloride 2023 105  98 - 120 mmol/L Final    CO2 2023 31  22 - 31 mmol/L Final    Anion Gap 2023 4.4 (L)  12 - 16 mmol/L Final    BUN 2023 32 (H)  9 - 20 mg/dL Final    Creatinine 2023 0.8  0.7 - 1.3 mg/dL Final    Creatinine Clearance 2023 58.8153   Final    Gfr Calculated 2023 > 60.0   Final    Glucose 2023 102  75 - 110

## 2024-01-04 ENCOUNTER — TELEPHONE (OUTPATIENT)
Dept: FAMILY MEDICINE CLINIC | Age: 89
End: 2024-01-04

## 2024-01-04 RX ORDER — AMOXICILLIN 500 MG/1
2000 TABLET, FILM COATED ORAL ONCE AS NEEDED
COMMUNITY

## 2024-01-04 RX ORDER — FINASTERIDE 5 MG/1
5 TABLET, FILM COATED ORAL DAILY
COMMUNITY

## 2024-01-04 NOTE — TELEPHONE ENCOUNTER
Patient brought in corrected med list. Med list in epic updated.     Updated- Tylenol 1000mg TID, B12 5000mcg daily    Removed- Voltaren gel, ketoconazole cream, mupirocin cream    Added - finasteride 5mg daily, amox 500 mg  - 4 tabs prior to dental appt.

## 2024-01-12 ENCOUNTER — OFFICE VISIT (OUTPATIENT)
Dept: FAMILY MEDICINE CLINIC | Age: 89
End: 2024-01-12
Payer: MEDICARE

## 2024-01-12 VITALS
OXYGEN SATURATION: 97 % | WEIGHT: 210.6 LBS | DIASTOLIC BLOOD PRESSURE: 80 MMHG | HEIGHT: 69 IN | SYSTOLIC BLOOD PRESSURE: 120 MMHG | BODY MASS INDEX: 31.19 KG/M2 | HEART RATE: 67 BPM

## 2024-01-12 DIAGNOSIS — H91.13 PRESBYCUSIS OF BOTH EARS: ICD-10-CM

## 2024-01-12 DIAGNOSIS — I73.9 PERIPHERAL VASCULAR DISEASE (HCC): ICD-10-CM

## 2024-01-12 DIAGNOSIS — D69.6 THROMBOCYTOPENIA (HCC): ICD-10-CM

## 2024-01-12 DIAGNOSIS — Z00.00 MEDICARE ANNUAL WELLNESS VISIT, SUBSEQUENT: Primary | ICD-10-CM

## 2024-01-12 PROCEDURE — 1123F ACP DISCUSS/DSCN MKR DOCD: CPT | Performed by: FAMILY MEDICINE

## 2024-01-12 PROCEDURE — G0439 PPPS, SUBSEQ VISIT: HCPCS | Performed by: FAMILY MEDICINE

## 2024-01-12 ASSESSMENT — PATIENT HEALTH QUESTIONNAIRE - PHQ9
2. FEELING DOWN, DEPRESSED OR HOPELESS: 0
SUM OF ALL RESPONSES TO PHQ9 QUESTIONS 1 & 2: 0
1. LITTLE INTEREST OR PLEASURE IN DOING THINGS: 0
SUM OF ALL RESPONSES TO PHQ QUESTIONS 1-9: 0

## 2024-01-12 NOTE — PROGRESS NOTES
Medicare Annual Wellness Visit    Aguila Mccarthy is here for Medicare AWV    Assessment & Plan   Medicare annual wellness visit, subsequent  Peripheral vascular disease (HCC)  Thrombocytopenia (HCC)  Presbycusis of both ears  -     External Referral To Audiology    Recommendations for Preventive Services Due: see orders and patient instructions/AVS.    Regular labs have been normal.  Routine labs ordered for his next follow-up visit including a CBC with a history of thrombocytopenia.    Recommended screening schedule for the next 5-10 years is provided to the patient in written form: see Patient Instructions/AVS.     No follow-ups on file.     Subjective   The following acute and/or chronic problems were also addressed today:  Hypertension has been doing very well at home.  Has been taking his blood pressure occasionally with his son or daughter-in-law checking it.  He has not had any symptoms no chest pain no shortness of breath no palpitations.  Did have 1 episode where is in the emergency room after [place].  Evaluation that was negative.  Has seen cardiology since that time and they did not feel it was cardiac related.  Will be going back to see cardiology in 1 year.  Holter monitor at that time.    Has seen the OT and the therapy and thinks this has helped some. Still the occasional cervicogenic headache. Has not been to the massage recently again. This really helped also.     Patient's complete Health Risk Assessment and screening values have been reviewed and are found in Flowsheets. The following problems were reviewed today and where indicated follow up appointments were made and/or referrals ordered.    Positive Risk Factor Screenings with Interventions:                Activity, Diet, and Weight:  On average, how many days per week do you engage in moderate to strenuous exercise (like a brisk walk)?: 7 days  On average, how many minutes do you engage in exercise at this level?: 20 min    Do you eat

## 2024-04-16 ENCOUNTER — TELEPHONE (OUTPATIENT)
Dept: UROLOGY | Age: 89
End: 2024-04-16

## 2024-04-16 DIAGNOSIS — N40.0 BENIGN PROSTATIC HYPERPLASIA, UNSPECIFIED WHETHER LOWER URINARY TRACT SYMPTOMS PRESENT: ICD-10-CM

## 2024-04-16 NOTE — TELEPHONE ENCOUNTER
Patient called stating he has an appointment with Dr Martino 5/1/2024.  He has something he really wants to do this day and wants to be seen first.  He wants the nurse to call him back.  [phone]

## 2024-04-17 RX ORDER — OXYBUTYNIN CHLORIDE 10 MG/1
TABLET, EXTENDED RELEASE ORAL
Qty: 90 TABLET | Refills: 3 | Status: SHIPPED | OUTPATIENT
Start: 2024-04-17

## 2024-04-17 NOTE — TELEPHONE ENCOUNTER
Aguila Mccarthy is requesting a refill on the following medication(s):  Requested Prescriptions     Pending Prescriptions Disp Refills    oxyBUTYnin (DITROPAN-XL) 10 MG extended release tablet [Pharmacy Med Name: OXYBUTYNIN CHLORIDE ER TABS 10MG] 90 tablet 3     Sig: TAKE 1 TABLET DAILY       Last Visit Date (If Applicable):  1/12/2024    Next Visit Date:    1/17/2024

## 2024-05-16 ENCOUNTER — TELEPHONE (OUTPATIENT)
Dept: UROLOGY | Age: 89
End: 2024-05-16

## 2024-05-16 NOTE — TELEPHONE ENCOUNTER
Patient called checking on his appointment schedule. Thought he had appointments on 5/20/24 and 5/23/24. Writer explained to patient that he had one appointment scheduled for 5/22/2024. Patient asked that Mary contact him or his son, if there is any other appointments that she is aware of.

## 2024-05-17 NOTE — TELEPHONE ENCOUNTER
Appointment for 5/22/24, discussed with patient again.  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

## 2024-05-22 ENCOUNTER — OFFICE VISIT (OUTPATIENT)
Dept: UROLOGY | Age: 89
End: 2024-05-22
Payer: MEDICARE

## 2024-05-22 VITALS
OXYGEN SATURATION: 95 % | BODY MASS INDEX: 30.84 KG/M2 | DIASTOLIC BLOOD PRESSURE: 72 MMHG | SYSTOLIC BLOOD PRESSURE: 118 MMHG | HEIGHT: 69 IN | HEART RATE: 68 BPM

## 2024-05-22 DIAGNOSIS — N40.0 BENIGN PROSTATIC HYPERPLASIA, UNSPECIFIED WHETHER LOWER URINARY TRACT SYMPTOMS PRESENT: ICD-10-CM

## 2024-05-22 PROCEDURE — 99214 OFFICE O/P EST MOD 30 MIN: CPT | Performed by: UROLOGY

## 2024-05-22 PROCEDURE — 1123F ACP DISCUSS/DSCN MKR DOCD: CPT | Performed by: UROLOGY

## 2024-05-22 RX ORDER — OXYBUTYNIN CHLORIDE 10 MG/1
10 TABLET, EXTENDED RELEASE ORAL DAILY
Qty: 90 TABLET | Refills: 3 | Status: SHIPPED | OUTPATIENT
Start: 2024-05-22

## 2024-05-22 NOTE — PROGRESS NOTES
Dr. Chente Martino Jr, MD MD  [facility] Consultation / FU Visit    Patient:  Aguila Mccarthy  YOB: 1935  Date: 5/22/2024  Consult requested from Ida Vann MD     HISTORY OF PRESENT ILLNESS:   The patient is a 88 y.o. male who presents today for follow-up for the following problem(s): right hydrocele  Overall the problem(s) : are worsening.  Associated Symptoms: No dysuria, gross hematuria.   Pain Severity: Pain Score:   0 - No pain    Today visit:   5/22/24   Post os s/p greenlight pvp for BPH, doing well - no hematuria.  Nocturia improved on Oxybutynin 10 mg QHS, 1 times.  AUASS: 6/2 (PVP), 10/2.    - worsening incontinence, started on tamsulosin    6/23/21  Today we discuss BPH with LUTs, and his findings of enlarged prostate with complex features.  He is interested in [facility].   We have a discussion with him and Dr. Mccarthy, who is present in the room. Risks benefits and alternative procedures are explained, informed consent is obtained, and the patient elects to proceed.     4/7/21   Aguila follows with us for a right sided hydrocele, and a renal cyst with complex features.  The cyst is small and appears stable on serial imaging.   GAEL is reviewed, and the mass measure 2.5 cm.    The He states the hydrocele is  no longer bothersome to him,  This actually appears to be a hernia of the right inguinal canal, which has been repaired and improved.  BPH with LUTs appears to be controlled on Proscar.  AUASS: 7/2.  His urinary frequency and urgency are controlled on Oxybutynin.  He states he has urinary incontinence is worsening despite medical therapy.      3/31/20 - this visit was performed with a video teleconference.  Doing well   Had a CT scan - multiple renal cysts and a hyperdense cyst  GAEL - demonstrated a renal cyst with solid component, consistent with hyperdense cysto demonstrated on CT scan  Pt asymptomatic  Denies hematuria    3/4/20  Hydrocele is improving,

## 2024-06-03 DIAGNOSIS — K29.01 GASTROINTESTINAL HEMORRHAGE ASSOCIATED WITH ACUTE GASTRITIS: ICD-10-CM

## 2024-06-03 RX ORDER — PANTOPRAZOLE SODIUM 20 MG/1
TABLET, DELAYED RELEASE ORAL
Qty: 90 TABLET | Refills: 3 | Status: SHIPPED | OUTPATIENT
Start: 2024-06-03

## 2024-06-03 NOTE — TELEPHONE ENCOUNTER
Aguila Mccarthy is calling to request a refill on the following medication(s):  Requested Prescriptions     Pending Prescriptions Disp Refills    pantoprazole (PROTONIX) 20 MG tablet [Pharmacy Med Name: PANTOPRAZOLE SOD DR 20 MG TAB] 90 tablet 3     Sig: take 1 tablet by mouth once daily       Last Visit Date (If Applicable):  1/12/2024    Next Visit Date:    Visit date not found

## 2024-06-24 ENCOUNTER — TELEPHONE (OUTPATIENT)
Dept: FAMILY MEDICINE CLINIC | Age: 89
End: 2024-06-24

## 2024-06-24 DIAGNOSIS — R35.0 URINARY FREQUENCY: ICD-10-CM

## 2024-06-24 RX ORDER — TAMSULOSIN HYDROCHLORIDE 0.4 MG/1
0.4 CAPSULE ORAL DAILY
Qty: 90 CAPSULE | Refills: 1 | Status: SHIPPED | OUTPATIENT
Start: 2024-06-24

## 2024-06-24 NOTE — TELEPHONE ENCOUNTER
Took Tamsulosini 0.4 mg in past for bladder control.  Dr rGoves wanted him to stop it, which he did.  Over the weekend, he had bladder control problems again and had 3 pills left and started taking them and it helped.  Will you prescribe again?

## 2024-08-06 ENCOUNTER — TELEPHONE (OUTPATIENT)
Dept: UROLOGY | Age: 89
End: 2024-08-06

## 2024-08-06 NOTE — TELEPHONE ENCOUNTER
Marii from [facility].Family called stating patient is asking if his appointment for tomorrow 8/08/24 can be moved up to as early as possible. If can be moved up, please contact patient.

## 2024-08-06 NOTE — PATIENT INSTRUCTIONS
If you need to reach the Urologist or Urology Nurses for any health care questions or concerns please call [phone] and ask to speak with the [facility] Urology Nurse.   The phone line is open from 8a-430p Monday thru Friday.

## 2024-08-07 ENCOUNTER — OFFICE VISIT (OUTPATIENT)
Dept: UROLOGY | Age: 89
End: 2024-08-07
Payer: MEDICARE

## 2024-08-07 VITALS — OXYGEN SATURATION: 97 % | SYSTOLIC BLOOD PRESSURE: 122 MMHG | HEART RATE: 80 BPM | DIASTOLIC BLOOD PRESSURE: 84 MMHG

## 2024-08-07 DIAGNOSIS — N40.0 BENIGN PROSTATIC HYPERPLASIA, UNSPECIFIED WHETHER LOWER URINARY TRACT SYMPTOMS PRESENT: ICD-10-CM

## 2024-08-07 PROCEDURE — 1123F ACP DISCUSS/DSCN MKR DOCD: CPT | Performed by: UROLOGY

## 2024-08-07 PROCEDURE — 99214 OFFICE O/P EST MOD 30 MIN: CPT | Performed by: UROLOGY

## 2024-08-07 NOTE — PROGRESS NOTES
Dr. Chente Martino Jr, MD MD  [facility] Consultation / FU Visit    Patient:  Aguila Mccarthy  YOB: 1935  Date: 8/7/2024  Consult requested from Ida Vann MD     HISTORY OF PRESENT ILLNESS:   The patient is a 88 y.o. male who presents today for follow-up for the following problem(s): right hydrocele  Overall the problem(s) : are worsening.  Associated Symptoms: No dysuria, gross hematuria.   Pain Severity:      Today visit:   8/7/24   Post os s/p greenlight pvp for BPH, doing well - no hematuria.  Nocturia improved on Oxybutynin 10 mg QHS, 1 times.    AUASS: 8/2, 6/2 (PVP), 10/2.    - may discontinue flomax.    6/23/21  Today we discuss BPH with LUTs, and his findings of enlarged prostate with complex features.  He is interested in [facility].   We have a discussion with him and Dr. Mccarthy, who is present in the room. Risks benefits and alternative procedures are explained, informed consent is obtained, and the patient elects to proceed.     4/7/21   Aguila follows with us for a right sided hydrocele, and a renal cyst with complex features.  The cyst is small and appears stable on serial imaging.   GAEL is reviewed, and the mass measure 2.5 cm.    The He states the hydrocele is  no longer bothersome to him,  This actually appears to be a hernia of the right inguinal canal, which has been repaired and improved.  BPH with LUTs appears to be controlled on Proscar.  AUASS: 7/2.  His urinary frequency and urgency are controlled on Oxybutynin.  He states he has urinary incontinence is worsening despite medical therapy.      3/31/20 - this visit was performed with a video teleconference.  Doing well   Had a CT scan - multiple renal cysts and a hyperdense cyst  GAEL - demonstrated a renal cyst with solid component, consistent with hyperdense cysto demonstrated on CT scan  Pt asymptomatic  Denies hematuria    3/4/20  Hydrocele is improving, smaller size  - possibly reactive after

## 2024-08-16 ENCOUNTER — TELEPHONE (OUTPATIENT)
Dept: UROLOGY | Age: 89
End: 2024-08-16

## 2024-08-16 DIAGNOSIS — N40.0 BENIGN PROSTATIC HYPERPLASIA, UNSPECIFIED WHETHER LOWER URINARY TRACT SYMPTOMS PRESENT: ICD-10-CM

## 2024-08-16 RX ORDER — OXYBUTYNIN CHLORIDE 10 MG/1
10 TABLET, EXTENDED RELEASE ORAL DAILY
Qty: 90 TABLET | Refills: 1 | Status: SHIPPED | OUTPATIENT
Start: 2024-08-16

## 2024-08-16 NOTE — TELEPHONE ENCOUNTER
Aguila CLIFFORD called requesting a refill of the below medication which has been pended for you: per verbal order Dr. Martino.      Requested Prescriptions     Pending Prescriptions Disp Refills    oxyBUTYnin (DITROPAN-XL) 10 MG extended release tablet 90 tablet 3     Sig: Take 1 tablet by mouth daily       Last Appointment Date: 8/7/2024  Next Appointment Date: 2/5/2025    No Known Allergies

## 2024-08-16 NOTE — TELEPHONE ENCOUNTER
Patients wife called asking for patients medication oxyBUTYnin (DITROPAN-XL) 10 MG extended release tablet, to be sent to [facility] in [place].

## 2024-08-28 RX ORDER — KETOCONAZOLE 20 MG/ML
SHAMPOO TOPICAL
Qty: 120 ML | Refills: 3 | Status: SHIPPED | OUTPATIENT
Start: 2024-08-28

## 2024-08-28 NOTE — TELEPHONE ENCOUNTER
Aguila Mccarthy is requesting a refill on the following medication(s):  Requested Prescriptions     Pending Prescriptions Disp Refills    ketoconazole (NIZORAL) 2 % shampoo 120 mL 3     Sig: APPLY TO SCALP, LATHER AND RINSE 2 TIMES WEEKLY       Last Visit Date (If Applicable):  1/12/2024    Next Visit Date:    1/17/2025

## 2024-09-10 RX ORDER — ATORVASTATIN CALCIUM 20 MG/1
TABLET, FILM COATED ORAL
Qty: 90 TABLET | Refills: 3 | Status: SHIPPED | OUTPATIENT
Start: 2024-09-10

## 2024-09-29 ENCOUNTER — TELEPHONE (OUTPATIENT)
Dept: FAMILY MEDICINE CLINIC | Age: 89
End: 2024-09-29

## 2024-09-29 DIAGNOSIS — R07.89 CHEST WALL PAIN: Primary | ICD-10-CM

## 2024-09-30 ENCOUNTER — OFFICE VISIT (OUTPATIENT)
Dept: FAMILY MEDICINE CLINIC | Age: 89
End: 2024-09-30
Payer: MEDICARE

## 2024-09-30 VITALS
SYSTOLIC BLOOD PRESSURE: 134 MMHG | WEIGHT: 209 LBS | OXYGEN SATURATION: 98 % | HEART RATE: 67 BPM | DIASTOLIC BLOOD PRESSURE: 76 MMHG | BODY MASS INDEX: 30.61 KG/M2

## 2024-09-30 DIAGNOSIS — R00.1 BRADYCARDIA: ICD-10-CM

## 2024-09-30 DIAGNOSIS — D69.6 THROMBOCYTOPENIA (HCC): ICD-10-CM

## 2024-09-30 DIAGNOSIS — E78.2 MIXED HYPERLIPIDEMIA: ICD-10-CM

## 2024-09-30 DIAGNOSIS — I10 ESSENTIAL HYPERTENSION: Primary | ICD-10-CM

## 2024-09-30 PROCEDURE — 99212 OFFICE O/P EST SF 10 MIN: CPT | Performed by: FAMILY MEDICINE

## 2024-09-30 SDOH — ECONOMIC STABILITY: FOOD INSECURITY: WITHIN THE PAST 12 MONTHS, THE FOOD YOU BOUGHT JUST DIDN'T LAST AND YOU DIDN'T HAVE MONEY TO GET MORE.: NEVER TRUE

## 2024-09-30 SDOH — ECONOMIC STABILITY: INCOME INSECURITY: HOW HARD IS IT FOR YOU TO PAY FOR THE VERY BASICS LIKE FOOD, HOUSING, MEDICAL CARE, AND HEATING?: NOT HARD AT ALL

## 2024-09-30 SDOH — ECONOMIC STABILITY: FOOD INSECURITY: WITHIN THE PAST 12 MONTHS, YOU WORRIED THAT YOUR FOOD WOULD RUN OUT BEFORE YOU GOT MONEY TO BUY MORE.: NEVER TRUE
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
## 2024-10-07 LAB
ALBUMIN/GLOBULIN RATIO: 1.6 G/DL
ALBUMIN: 4.2 G/DL (ref 3.5–5)
ALP BLD-CCNC: 83 UNITS/L (ref 38–126)
ALT SERPL-CCNC: 20 UNITS/L (ref 4–50)
ANION GAP SERPL CALCULATED.3IONS-SCNC: 10.7 MMOL/L (ref 3–11)
AST SERPL-CCNC: 34 UNITS/L (ref 17–59)
BASOPHILS ABSOLUTE: 0.04 X10E3/?L (ref 0–0.3)
BASOPHILS RELATIVE PERCENT: 0.87 % (ref 0–3)
BILIRUB SERPL-MCNC: 0.9 MG/DL (ref 0.2–1.3)
BUN BLDV-MCNC: 24 MG/DL (ref 9–20)
CALCIUM SERPL-MCNC: 9.3 MG/DL (ref 8.4–10.2)
CHLORIDE BLD-SCNC: 104 MMOL/L (ref 98–120)
CHOLESTEROL, TOTAL: 175 MG/DL (ref 50–200)
CHOLESTEROL/HDL RATIO: 3 RATIO (ref 0–4.5)
CO2: 32 MMOL/L (ref 22–31)
CREAT SERPL-MCNC: 0.8 MG/DL (ref 0.7–1.3)
EOSINOPHILS ABSOLUTE: 0.18 X10E3/?L (ref 0–1.1)
EOSINOPHILS RELATIVE PERCENT: 3.83 % (ref 0–10)
GFR, ESTIMATED: > 60
GLOBULIN: 2.6 G/DL
GLUCOSE: 89 MG/DL (ref 75–110)
HCT VFR BLD CALC: 47.2 % (ref 42–52)
HDLC SERPL-MCNC: 66 MG/DL (ref 36–68)
HEMOGLOBIN: 14.8 G/DL (ref 13.8–17.8)
LDL CHOLESTEROL: 95.8 MG/DL (ref 0–160)
LYMPHOCYTES ABSOLUTE: 0.9 X10E3/?L (ref 1–5.5)
LYMPHOCYTES RELATIVE PERCENT: 18.71 % (ref 20–51.1)
MCH RBC QN AUTO: 33.1 PG (ref 28.5–32.5)
MCHC RBC AUTO-ENTMCNC: 31.3 G/DL (ref 32–37)
MCV RBC AUTO: 105.6 FL (ref 80–94)
MONOCYTES ABSOLUTE: 0.55 X10E3/?L (ref 0.1–1)
MONOCYTES RELATIVE PERCENT: 11.45 % (ref 1.7–9.3)
NEUTROPHILS ABSOLUTE: 3.12 X10E3/?L (ref 2–8.1)
NEUTROPHILS RELATIVE PERCENT: 65.15 % (ref 42.2–75.2)
PDW BLD-RTO: 11.5 % (ref 10–15.5)
PLATELET # BLD: 141.8 THOU/MM3 (ref 130–400)
POTASSIUM SERPL-SCNC: 4.7 MMOL/L (ref 3.6–5)
RBC # BLD: 4.47 M/UL (ref 4.7–6.1)
SODIUM BLD-SCNC: 142 MMOL/L (ref 135–145)
TOTAL PROTEIN: 6.8 G/DL (ref 6.3–8.2)
TRIGL SERPL-MCNC: 66 MG/DL (ref 10–250)
TSH REFLEX FT4: 1.46 MIU/ML (ref 0.49–4.67)
VLDLC SERPL CALC-MCNC: 13 MG/DL (ref 0–50)
WBC # BLD: 4.8 THOU/ML3 (ref 4.8–10.8)

## 2024-10-10 DIAGNOSIS — R00.1 BRADYCARDIA: ICD-10-CM

## 2024-11-07 NOTE — PROGRESS NOTES
97.2 °F (36.2 °C)     General:in no apparent distress, well developed and well nourished, alert and oriented times 3  Eyes: No gross abnormalities. Ears, Nose, Throat: hearing grossly normal bilaterally  Neck: neck supple and non tender without mass  Lungs: clear to auscultation without wheezes or rales   Heart: S1S2, no mumurs, RRR  Abdomen: soft, nontender, no HSM, no guarding, no rebound, no masses  Extremity: In the anteromedial aspect of the right forearm at the level of the elbow incision is present which is intact. Small amount of induration in the tissue consistent with healing and there is a small area of fluctuance consistent with a small seroma. No drainage from the incision no erythema present. Patient has good sensation in the distal aspect of the arm and hand and good motor function. Neuro: CN II-XII grossly intact      Assessment     1. 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 I discussed the pathology results with Nely Bauman today and he voices understanding. 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 No further interventions needed at this time. Patient is healing well and may return to activity as tolerated. Follow-up as needed.     Electronically signed by Carolina Sweeney DO on 1/7/2020 at 10:08 AM      (Please note that portions of this note were completed with a voice recognition program.  Efforts were made to edit the dictations but occasionally words are mis-transcribed.)
show

## 2024-12-05 ENCOUNTER — TELEPHONE (OUTPATIENT)
Dept: FAMILY MEDICINE CLINIC | Age: 89
End: 2024-12-05

## 2024-12-05 ENCOUNTER — OFFICE VISIT (OUTPATIENT)
Dept: FAMILY MEDICINE CLINIC | Age: 89
End: 2024-12-05
Payer: MEDICARE

## 2024-12-05 VITALS
WEIGHT: 210 LBS | BODY MASS INDEX: 30.75 KG/M2 | DIASTOLIC BLOOD PRESSURE: 82 MMHG | HEART RATE: 75 BPM | SYSTOLIC BLOOD PRESSURE: 136 MMHG | OXYGEN SATURATION: 94 % | TEMPERATURE: 97.6 F

## 2024-12-05 DIAGNOSIS — Z23 NEEDS FLU SHOT: ICD-10-CM

## 2024-12-05 DIAGNOSIS — N39.41 URGENCY INCONTINENCE: Primary | ICD-10-CM

## 2024-12-05 DIAGNOSIS — R30.0 DYSURIA: ICD-10-CM

## 2024-12-05 DIAGNOSIS — R51.9 CHRONIC DAILY HEADACHE: ICD-10-CM

## 2024-12-05 LAB
BILIRUBIN, POC: NEGATIVE
BLOOD URINE, POC: NORMAL
CLARITY, POC: NORMAL
COLOR, POC: NORMAL
GLUCOSE URINE, POC: NEGATIVE MG/DL
KETONES, POC: NORMAL MG/DL
LEUKOCYTE EST, POC: NEGATIVE
NITRITE, POC: NEGATIVE
PH, POC: 6
PROTEIN, POC: NORMAL MG/DL
SPECIFIC GRAVITY, POC: 1.02
UROBILINOGEN, POC: 0.2 MG/DL

## 2024-12-05 PROCEDURE — 99212 OFFICE O/P EST SF 10 MIN: CPT | Performed by: FAMILY MEDICINE

## 2024-12-05 PROCEDURE — 81002 URINALYSIS NONAUTO W/O SCOPE: CPT | Performed by: FAMILY MEDICINE

## 2024-12-05 NOTE — PROGRESS NOTES
sodium (COLACE) 100 MG capsule Take 1 capsule by mouth 2 times daily 60 capsule 0    polyethylene glycol (GLYCOLAX) powder Take 17 g by mouth daily      acetaminophen (TYLENOL) 500 MG tablet Take 2 tablets by mouth every 8 (eight) hours       No current facility-administered medications for this visit.     No Known Allergies    Health Maintenance   Topic Date Due    Polio vaccine (2 of 3 - Adult catch-up series) 06/01/2001    Flu vaccine (1) 08/01/2024    Depression Screen  01/12/2025    Lipids  10/07/2025    DTaP/Tdap/Td vaccine (3 - Td or Tdap) 03/06/2030    Hepatitis B vaccine  Completed    Annual Wellness Visit (Medicare Advantage)  Completed    Shingles vaccine  Completed    Pneumococcal 65+ years Vaccine  Completed    COVID-19 Vaccine  Completed    Respiratory Syncytial Virus (RSV) Pregnant or age 60 yrs+  Completed    Hepatitis A vaccine  Aged Out    Hib vaccine  Aged Out    Meningococcal (ACWY) vaccine  Aged Out         Review of Systems    Objective:     /82 (Site: Left Upper Arm, Position: Sitting, Cuff Size: Medium Adult)   Pulse 75   Temp 97.6 °F (36.4 °C)   Wt 95.3 kg (210 lb)   SpO2 94%   BMI 30.75 kg/m²     Physical Exam      Physical Exam  Vitals and nursing note reviewed.   Constitutional:       Appearance: Normal appearance. He is well-developed.   HENT:      Head: Normocephalic and atraumatic.   Eyes:      Conjunctiva/sclera: Conjunctivae normal.   Neck:      Thyroid: No thyromegaly.      Vascular: No JVD.   Cardiovascular:      Rate and Rhythm: Normal rate and regular rhythm.      Heart sounds: Normal heart sounds. No murmur heard.     No friction rub. No gallop.   Pulmonary:      Effort: Pulmonary effort is normal. No respiratory distress.      Breath sounds: Normal breath sounds.   Musculoskeletal:      Cervical back: Normal range of motion and neck supple.      Right lower leg: No edema.      Left lower leg: No edema.   Lymphadenopathy:      Cervical: No cervical adenopathy.

## 2024-12-05 NOTE — TELEPHONE ENCOUNTER
Was told to call back and let you know the name of the medication the dermatologist prescribed.  It is Cephalexin 500 mg bid x 7 days

## 2024-12-08 RX ORDER — TOLTERODINE 4 MG/1
4 CAPSULE, EXTENDED RELEASE ORAL DAILY
Qty: 30 CAPSULE | Refills: 3 | Status: SHIPPED | OUTPATIENT
Start: 2024-12-08

## 2025-01-07 ENCOUNTER — NURSE ONLY (OUTPATIENT)
Dept: FAMILY MEDICINE CLINIC | Age: 89
End: 2025-01-07
Payer: MEDICARE

## 2025-01-07 DIAGNOSIS — Z23 NEED FOR PROPHYLACTIC VACCINATION AND INOCULATION AGAINST INFLUENZA: Primary | ICD-10-CM

## 2025-01-07 PROCEDURE — 90653 IIV ADJUVANT VACCINE IM: CPT | Performed by: FAMILY MEDICINE

## 2025-01-07 PROCEDURE — PBSHW INFLUENZA, FLUAD TRIVALENT, (AGE 65 Y+), IM, PRESERVATIVE FREE, 0.5ML: Performed by: FAMILY MEDICINE

## 2025-01-07 NOTE — PROGRESS NOTES
Have you had an allergic reaction to the flu (influenza) shot? no  Are you allergic to eggs or any component of the flu vaccine? no  Do you have a history of Guillain-[place] Syndrome (GBS), which is paralysis after receiving the flu vaccine? no  Are you feeling well today? yes  Flu vaccine given as ordered.  Patient tolerated it well.  No questions re: VIS information.

## 2025-01-14 LAB
ANION GAP SERPL CALCULATED.3IONS-SCNC: 2.3 MMOL/L (ref 3–11)
BASOPHILS ABSOLUTE: 0.06 X10E3/?L (ref 0–0.3)
BASOPHILS RELATIVE PERCENT: 0.85 % (ref 0–3)
BUN BLDV-MCNC: 30 MG/DL (ref 9–20)
CALCIUM SERPL-MCNC: 9.2 MG/DL (ref 8.4–10.2)
CHLORIDE BLD-SCNC: 104 MMOL/L (ref 98–120)
CO2: 31 MMOL/L (ref 22–31)
CREAT SERPL-MCNC: 0.8 MG/DL (ref 0.7–1.3)
EOSINOPHILS ABSOLUTE: 0.21 X10E3/?L (ref 0–1.1)
EOSINOPHILS RELATIVE PERCENT: 3.02 % (ref 0–10)
GFR, ESTIMATED: > 60
GLUCOSE: 86 MG/DL (ref 75–110)
HCT VFR BLD CALC: 44.2 % (ref 42–52)
HEMOGLOBIN: 13.6 G/DL (ref 13.8–17.8)
LYMPHOCYTES ABSOLUTE: 1.04 X10E3/?L (ref 1–5.5)
LYMPHOCYTES RELATIVE PERCENT: 14.63 % (ref 20–51.1)
MCH RBC QN AUTO: 31.6 PG (ref 28.5–32.5)
MCHC RBC AUTO-ENTMCNC: 30.7 G/DL (ref 32–37)
MCV RBC AUTO: 102.9 FL (ref 80–94)
MONOCYTES ABSOLUTE: 0.7 X10E3/?L (ref 0.1–1)
MONOCYTES RELATIVE PERCENT: 9.93 % (ref 1.7–9.3)
NEUTROPHILS ABSOLUTE: 5.07 X10E3/?L (ref 2–8.1)
NEUTROPHILS RELATIVE PERCENT: 71.57 % (ref 42.2–75.2)
PDW BLD-RTO: 12.5 % (ref 10–15.5)
PLATELET # BLD: 170 THOU/MM3 (ref 130–400)
POTASSIUM SERPL-SCNC: 4.9 MMOL/L (ref 3.6–5)
RBC # BLD: 4.3 M/UL (ref 4.7–6.1)
SODIUM BLD-SCNC: 138 MMOL/L (ref 135–145)
WBC # BLD: 7.1 THOU/ML3 (ref 4.8–10.8)

## 2025-01-17 ENCOUNTER — OFFICE VISIT (OUTPATIENT)
Dept: FAMILY MEDICINE CLINIC | Age: 89
End: 2025-01-17

## 2025-01-17 VITALS
DIASTOLIC BLOOD PRESSURE: 80 MMHG | OXYGEN SATURATION: 96 % | SYSTOLIC BLOOD PRESSURE: 124 MMHG | HEART RATE: 78 BPM | HEIGHT: 72 IN | WEIGHT: 210.2 LBS | RESPIRATION RATE: 14 BRPM | BODY MASS INDEX: 28.47 KG/M2

## 2025-01-17 DIAGNOSIS — R51.9 CHRONIC DAILY HEADACHE: ICD-10-CM

## 2025-01-17 DIAGNOSIS — I10 ESSENTIAL HYPERTENSION: ICD-10-CM

## 2025-01-17 DIAGNOSIS — Z00.00 MEDICARE ANNUAL WELLNESS VISIT, SUBSEQUENT: Primary | ICD-10-CM

## 2025-01-17 SDOH — ECONOMIC STABILITY: FOOD INSECURITY: WITHIN THE PAST 12 MONTHS, YOU WORRIED THAT YOUR FOOD WOULD RUN OUT BEFORE YOU GOT MONEY TO BUY MORE.: PATIENT DECLINED

## 2025-01-17 SDOH — ECONOMIC STABILITY: FOOD INSECURITY: WITHIN THE PAST 12 MONTHS, THE FOOD YOU BOUGHT JUST DIDN'T LAST AND YOU DIDN'T HAVE MONEY TO GET MORE.: PATIENT DECLINED

## 2025-01-17 ASSESSMENT — PATIENT HEALTH QUESTIONNAIRE - PHQ9
SUM OF ALL RESPONSES TO PHQ9 QUESTIONS 1 & 2: 3
1. LITTLE INTEREST OR PLEASURE IN DOING THINGS: MORE THAN HALF THE DAYS
3. TROUBLE FALLING OR STAYING ASLEEP: NOT AT ALL
8. MOVING OR SPEAKING SO SLOWLY THAT OTHER PEOPLE COULD HAVE NOTICED. OR THE OPPOSITE, BEING SO FIGETY OR RESTLESS THAT YOU HAVE BEEN MOVING AROUND A LOT MORE THAN USUAL: NOT AT ALL
SUM OF ALL RESPONSES TO PHQ QUESTIONS 1-9: 5
SUM OF ALL RESPONSES TO PHQ9 QUESTIONS 1 & 2: 0
SUM OF ALL RESPONSES TO PHQ QUESTIONS 1-9: 0
7. TROUBLE CONCENTRATING ON THINGS, SUCH AS READING THE NEWSPAPER OR WATCHING TELEVISION: NOT AT ALL
SUM OF ALL RESPONSES TO PHQ QUESTIONS 1-9: 0
10. IF YOU CHECKED OFF ANY PROBLEMS, HOW DIFFICULT HAVE THESE PROBLEMS MADE IT FOR YOU TO DO YOUR WORK, TAKE CARE OF THINGS AT HOME, OR GET ALONG WITH OTHER PEOPLE: NOT DIFFICULT AT ALL
1. LITTLE INTEREST OR PLEASURE IN DOING THINGS: NOT AT ALL
4. FEELING TIRED OR HAVING LITTLE ENERGY: NOT AT ALL
2. FEELING DOWN, DEPRESSED OR HOPELESS: NOT AT ALL
SUM OF ALL RESPONSES TO PHQ QUESTIONS 1-9: 5
6. FEELING BAD ABOUT YOURSELF - OR THAT YOU ARE A FAILURE OR HAVE LET YOURSELF OR YOUR FAMILY DOWN: SEVERAL DAYS
SUM OF ALL RESPONSES TO PHQ QUESTIONS 1-9: 0
SUM OF ALL RESPONSES TO PHQ QUESTIONS 1-9: 0
9. THOUGHTS THAT YOU WOULD BE BETTER OFF DEAD, OR OF HURTING YOURSELF: NOT AT ALL
2. FEELING DOWN, DEPRESSED OR HOPELESS: SEVERAL DAYS
5. POOR APPETITE OR OVEREATING: SEVERAL DAYS

## 2025-01-17 NOTE — PROGRESS NOTES
Medicare Annual Wellness Visit    Aguila Mccarthy is here for Medicare AWV    Assessment & Plan  1. Wellness visit.  His blood pressure readings are within the normal range, and his weight remains stable. He has undergone routine blood work, which yielded satisfactory results. His EKG results, although not entirely normal, do not raise any immediate concerns. The basal cell carcinoma on his skin has been successfully excised, and the wound appears to be healing well. Dr. Garg does not suspect malignancy in the prostate; rather, he believes it to be a case of benign prostatic hyperplasia. His event monitor indicates a slow heart rate, which will be closely monitored. His swelling appears to be consistent with his usual baseline. He is advised to maintain an active lifestyle to manage his arthritis. He is encouraged to exercise caution while shoveling snow to prevent falls. He is advised to consult his ophthalmologist for further evaluation of his eye condition. He is advised to report any persistent cold symptoms.    2. Suspected sleep apnea.  He has experienced episodes of second-degree AV block during sleep, which may be indicative of sleep apnea. A sleep study has been recommended to confirm the diagnosis. If the sleep study reveals frequent pauses in breathing (more than five times per hour), treatment with a CPAP machine will be recommended. He is advised to consider the potential benefits of treating sleep apnea, including improved heart rhythm and overall health. This has not been ordered today.  Patient will discuss findings with Cardiology and proceed from there.  Will order and follow if they request our assistance.  Discussed considering.  Wife is concerned that he would not be compliant in light of his urinary frequency at night.     3. Benign prostatic hyperplasia.  A cystoscopy revealed regrowth of the prostate. A TURP procedure is planned to alleviate symptoms. He will be informed of the date once it

## 2025-01-29 RX ORDER — TAMSULOSIN HYDROCHLORIDE 0.4 MG/1
0.4 CAPSULE ORAL DAILY
Qty: 90 CAPSULE | Refills: 3 | Status: SHIPPED | OUTPATIENT
Start: 2025-01-29

## 2025-01-29 NOTE — TELEPHONE ENCOUNTER
Aguila Mccarthy is requesting a refill on the following medication(s):  Requested Prescriptions     Pending Prescriptions Disp Refills    tamsulosin (FLOMAX) 0.4 MG capsule [Pharmacy Med Name: Tamsulosin HCl 0.4 MG Oral Capsule] 90 capsule 0     Sig: Take 1 capsule by mouth once daily       Last Visit Date (If Applicable):  1/17/2025    Next Visit Date:    7/24/2025

## 2025-03-12 ENCOUNTER — OFFICE VISIT (OUTPATIENT)
Dept: FAMILY MEDICINE CLINIC | Age: 89
End: 2025-03-12
Payer: MEDICARE

## 2025-03-12 VITALS
DIASTOLIC BLOOD PRESSURE: 68 MMHG | WEIGHT: 209 LBS | HEART RATE: 72 BPM | BODY MASS INDEX: 28.35 KG/M2 | OXYGEN SATURATION: 96 % | SYSTOLIC BLOOD PRESSURE: 130 MMHG

## 2025-03-12 DIAGNOSIS — M25.551 RIGHT HIP PAIN: Primary | ICD-10-CM
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
## 2025-03-12 PROCEDURE — 99213 OFFICE O/P EST LOW 20 MIN: CPT | Performed by: FAMILY MEDICINE

## 2025-03-12 NOTE — PROGRESS NOTES
[place] [address], [address]  [place] [address]  Dept: [phone]  Dept Fax:[phone]    Aguila Mccarthy is a 89 y.o. male who presents today for his medical conditions/complaints as notedbelow.        Assessment/Plan:     Assessment & Plan  1. Hip pain.  The patient's hip discomfort is likely due to muscle pain and bruising from the fall, as evidenced by the absence of significant findings on the x-ray. He reports feeling like he is sitting on something rather than experiencing significant pain. He is currently taking the maximum dose of Tylenol, 3 g per day, which he will continue. Gabapentin was given in the ER but caused excessive sleepiness, so it will not be continued. A referral for physical therapy will be made to help strengthen his leg and improve mobility. He is advised to avoid using his arm for pushing, pulling, and reaching activities, especially with an upcoming pacemaker procedure on 04/09/2025. A prescription for a handicap placard renewal has been provided.    PROCEDURE  The patient underwent hip replacement surgery in 2014.      Assessment & Plan  Right hip pain       Orders:    [facility]    [facility] by Does not apply route The disability is expected to last 5 years  Dx: hip arthritis    Fall from steps, initial encounter         Results  Imaging  X-rays in the ER showed no major fractures.    Lab Results   Component Value Date    WBC 7.1 01/14/2025    HGB 13.6 (L) 01/14/2025    HCT 44.2 01/14/2025    .0 01/14/2025    CHOL 175 10/07/2024    TRIG 66 10/07/2024    HDL 66 10/07/2024    ALT 20 10/07/2024    AST 34 10/07/2024     01/14/2025    K 4.9 01/14/2025     01/14/2025    CREATININE 0.8 01/14/2025    BUN 30 (H) 01/14/2025    CO2 31 01/14/2025    INR 1.2 05/21/2022    LABA1C 5.4 05/27/2023       No follow-ups on file.      Subjective:      HPI:     Aguila Mccarthy is c/o of ED

## 2025-04-08 DIAGNOSIS — N39.41 URGENCY INCONTINENCE: ICD-10-CM

## 2025-04-08 NOTE — TELEPHONE ENCOUNTER
Aguila Mccarthy is requesting a refill on the following medication(s):  Requested Prescriptions     Pending Prescriptions Disp Refills    tolterodine (DETROL LA) 4 MG extended release capsule 30 capsule 3     Sig: Take 1 capsule by mouth daily       Last Visit Date (If Applicable):  3/12/2025    Next Visit Date:    7/24/2025

## 2025-04-09 DIAGNOSIS — N39.41 URGENCY INCONTINENCE: ICD-10-CM

## 2025-04-09 RX ORDER — TOLTERODINE 4 MG/1
4 CAPSULE, EXTENDED RELEASE ORAL DAILY
Qty: 30 CAPSULE | Refills: 5 | Status: SHIPPED | OUTPATIENT
Start: 2025-04-09 | End: 2025-04-09

## 2025-04-09 RX ORDER — TOLTERODINE 4 MG/1
4 CAPSULE, EXTENDED RELEASE ORAL DAILY
Qty: 90 CAPSULE | Refills: 1 | Status: SHIPPED | OUTPATIENT
Start: 2025-04-09

## 2025-04-09 NOTE — TELEPHONE ENCOUNTER
Aguila Mccarthy is requesting a refill on the following medication(s):  Requested Prescriptions     Pending Prescriptions Disp Refills    tolterodine (DETROL LA) 4 MG extended release capsule [Pharmacy Med Name: Tolterodine Tartrate ER 4 MG Oral Capsule Extended Release 24 Hour] 90 capsule 0     Sig: Take 1 capsule by mouth once daily       Last Visit Date (If Applicable):  3/12/2025    Next Visit Date:    7/24/2025

## 2025-04-21 ENCOUNTER — TELEPHONE (OUTPATIENT)
Dept: FAMILY MEDICINE CLINIC | Age: 89
End: 2025-04-21

## 2025-04-21 NOTE — TELEPHONE ENCOUNTER
Need results of the sleep study- patient is miserable with the CPAP machine. Please see if we can get these results- evidently done at [facility].  Thanks.  I believe this was set up by cardiology?? I see initial referral but nothing else.

## 2025-05-07 NOTE — TELEPHONE ENCOUNTER
Spoke with William and encouraged continued use and working on the different masks and possibly a CPAP pillow.  He will continue to work on this.

## 2025-05-22 DIAGNOSIS — K29.01 GASTROINTESTINAL HEMORRHAGE ASSOCIATED WITH ACUTE GASTRITIS: ICD-10-CM

## 2025-05-22 RX ORDER — PANTOPRAZOLE SODIUM 20 MG/1
20 TABLET, DELAYED RELEASE ORAL DAILY
Qty: 270 TABLET | Refills: 0 | Status: SHIPPED | OUTPATIENT
Start: 2025-05-22

## 2025-05-22 NOTE — TELEPHONE ENCOUNTER
Aguila Mccarthy is requesting a refill on the following medication(s):  Requested Prescriptions     Pending Prescriptions Disp Refills    pantoprazole (PROTONIX) 20 MG tablet [Pharmacy Med Name: Pantoprazole Sodium 20 MG Oral Tablet Delayed Release] 270 tablet 0     Sig: Take 1 tablet by mouth once daily       Last Visit Date (If Applicable):  3/12/2025    Next Visit Date:    7/24/2025

## 2025-06-12 RX ORDER — ATORVASTATIN CALCIUM 20 MG/1
TABLET, FILM COATED ORAL
Qty: 90 TABLET | Refills: 3 | Status: SHIPPED | OUTPATIENT
Start: 2025-06-12

## 2025-06-12 NOTE — TELEPHONE ENCOUNTER
Aguila Mccarthy is requesting a refill on the following medication(s):  Requested Prescriptions     Pending Prescriptions Disp Refills    atorvastatin (LIPITOR) 20 MG tablet 90 tablet 3     Sig: take 1 tablet by mouth at bedtime       Last Visit Date (If Applicable):  3/12/2025      Next Visit Date:    7/24/2025

## 2025-06-23 LAB
ANION GAP SERPL CALCULATED.3IONS-SCNC: 8.9 MMOL/L (ref 3–11)
BASOPHILS ABSOLUTE: 0.04 X10E3/?L (ref 0–0.3)
BASOPHILS RELATIVE PERCENT: 0.82 % (ref 0–3)
BUN BLDV-MCNC: 33 MG/DL (ref 9–20)
CALCIUM SERPL-MCNC: 9.4 MG/DL (ref 8.4–10.2)
CHLORIDE BLD-SCNC: 107 MMOL/L (ref 98–120)
CO2: 33 MMOL/L (ref 22–31)
CREAT SERPL-MCNC: 0.9 MG/DL (ref 0.7–1.3)
EOSINOPHILS ABSOLUTE: 0.19 X10E3/?L (ref 0–1.1)
EOSINOPHILS RELATIVE PERCENT: 3.94 % (ref 0–10)
GFR, ESTIMATED: > 60
GLUCOSE: 88 MG/DL (ref 75–110)
HCT VFR BLD CALC: 42.6 % (ref 42–52)
HEMOGLOBIN: 13.1 G/DL (ref 13.8–17.8)
LYMPHOCYTES ABSOLUTE: 0.98 X10E3/?L (ref 1–5.5)
LYMPHOCYTES RELATIVE PERCENT: 20.61 % (ref 20–51.1)
MCH RBC QN AUTO: 31.9 PG (ref 28.5–32.5)
MCHC RBC AUTO-ENTMCNC: 30.8 G/DL (ref 32–37)
MCV RBC AUTO: 103.5 FL (ref 80–94)
MONOCYTES ABSOLUTE: 0.49 X10E3/?L (ref 0.1–1)
MONOCYTES RELATIVE PERCENT: 10.33 % (ref 1.7–9.3)
NEUTROPHILS ABSOLUTE: 3.07 X10E3/?L (ref 2–8.1)
NEUTROPHILS RELATIVE PERCENT: 64.31 % (ref 42.2–75.2)
PDW BLD-RTO: 12.9 % (ref 10–15.5)
PLATELET # BLD: 139 THOU/MM3 (ref 130–400)
POTASSIUM SERPL-SCNC: 4.9 MMOL/L (ref 3.6–5)
RBC # BLD: 4.11 M/UL (ref 4.7–6.1)
SODIUM BLD-SCNC: 144 MMOL/L (ref 135–145)
WBC # BLD: 4.8 THOU/ML3 (ref 4.8–10.8)

## 2025-07-08 NOTE — PROGRESS NOTES
Preoperative Instructions:    Stop eating solid foods at midnight the night prior to your surgery.     Stop drinking clear liquids at midnight the night prior to your surgery.    Arrive at the surgery center (3rd entrance) on ___4-50-15____________ by __1015 am _____________.     Please stop any blood thinning medications as directed by your surgeon or prescribing physician. Failure to stop certain medications may interfere with your scheduled surgery. These may include: Aspirin, Coumadin, Plavix, NSAIDS (Motrin, Aleve, Advil, Mobic, Celebrex), Eliquis, Pradaxa, Xarelto, Fish oil, and herbal supplements.  HOLD /STOP ASPirin as directed by cardiology     You may continue the rest of your medications through the night before surgery unless instructed otherwise.     Day of surgery please take only the following medication(s) with a small sip of water: Pepcid or Protonix    Please bring cpap with you in case it is needed.     Please shower with antibacterial soap and water  the day of surgery.      Reminders:  -If you are going home the day of your procedure, you will need a family member or friend to stay during the procedure and drive you home after your procedure. Your  must be 18 years of age or older and able to sign off on your discharge instructions.    -If you are going home the same day of your surgery, someone must remain with you for the first 24 hours after your surgery if you receive sedation or anesthesia.     -Please do not wear any jewelery , lotions,or body piercing the day of surgery

## 2025-07-15 ENCOUNTER — ANESTHESIA EVENT (OUTPATIENT)
Dept: OPERATING ROOM | Age: 89
End: 2025-07-15
Payer: MEDICARE

## 2025-07-16 ENCOUNTER — HOSPITAL ENCOUNTER (OUTPATIENT)
Age: 89
Setting detail: OUTPATIENT SURGERY
Discharge: HOME | End: 2025-07-16
Attending: UROLOGY | Admitting: UROLOGY
Payer: MEDICARE

## 2025-07-16 ENCOUNTER — ANESTHESIA (OUTPATIENT)
Dept: OPERATING ROOM | Age: 89
End: 2025-07-16
Payer: MEDICARE

## 2025-07-16 VITALS
HEART RATE: 62 BPM | HEIGHT: 72 IN | SYSTOLIC BLOOD PRESSURE: 161 MMHG | TEMPERATURE: 97.1 F | BODY MASS INDEX: 28.31 KG/M2 | RESPIRATION RATE: 15 BRPM | DIASTOLIC BLOOD PRESSURE: 98 MMHG | WEIGHT: 209 LBS | OXYGEN SATURATION: 94 %

## 2025-07-16 DIAGNOSIS — N40.1 BPH WITH ELEVATED PSA AND LOWER URINARY TRACT SYMPTOMS: ICD-10-CM

## 2025-07-16 DIAGNOSIS — R97.20 BPH WITH ELEVATED PSA AND LOWER URINARY TRACT SYMPTOMS: ICD-10-CM

## 2025-07-16 PROCEDURE — 2500000003 HC RX 250 WO HCPCS: Performed by: ANESTHESIOLOGY

## 2025-07-16 PROCEDURE — 6360000002 HC RX W HCPCS: Performed by: ANESTHESIOLOGY

## 2025-07-16 PROCEDURE — 3600000013 HC SURGERY LEVEL 3 ADDTL 15MIN: Performed by: UROLOGY

## 2025-07-16 PROCEDURE — 7100000001 HC PACU RECOVERY - ADDTL 15 MIN: Performed by: UROLOGY

## 2025-07-16 PROCEDURE — 7100000011 HC PHASE II RECOVERY - ADDTL 15 MIN: Performed by: UROLOGY

## 2025-07-16 PROCEDURE — 6360000002 HC RX W HCPCS: Performed by: UROLOGY

## 2025-07-16 PROCEDURE — 3700000000 HC ANESTHESIA ATTENDED CARE: Performed by: UROLOGY

## 2025-07-16 PROCEDURE — 7100000000 HC PACU RECOVERY - FIRST 15 MIN: Performed by: UROLOGY

## 2025-07-16 PROCEDURE — 6360000002 HC RX W HCPCS

## 2025-07-16 PROCEDURE — 2720000010 HC SURG SUPPLY STERILE: Performed by: UROLOGY

## 2025-07-16 PROCEDURE — 2580000003 HC RX 258: Performed by: ANESTHESIOLOGY

## 2025-07-16 PROCEDURE — 2709999900 HC NON-CHARGEABLE SUPPLY: Performed by: UROLOGY

## 2025-07-16 PROCEDURE — 3700000001 HC ADD 15 MINUTES (ANESTHESIA): Performed by: UROLOGY

## 2025-07-16 PROCEDURE — 88305 TISSUE EXAM BY PATHOLOGIST: CPT

## 2025-07-16 PROCEDURE — 3600000003 HC SURGERY LEVEL 3 BASE: Performed by: UROLOGY

## 2025-07-16 PROCEDURE — 7100000010 HC PHASE II RECOVERY - FIRST 15 MIN: Performed by: UROLOGY

## 2025-07-16 RX ORDER — SODIUM CHLORIDE 0.9 % (FLUSH) 0.9 %
5-40 SYRINGE (ML) INJECTION EVERY 12 HOURS SCHEDULED
Status: DISCONTINUED | OUTPATIENT
Start: 2025-07-16 | End: 2025-07-16 | Stop reason: HOSPADM

## 2025-07-16 RX ORDER — DOXYCYCLINE HYCLATE 100 MG
100 TABLET ORAL 2 TIMES DAILY
Qty: 6 TABLET | Refills: 0 | Status: SHIPPED | OUTPATIENT
Start: 2025-07-16 | End: 2025-07-19

## 2025-07-16 RX ORDER — LIDOCAINE HYDROCHLORIDE 10 MG/ML
INJECTION, SOLUTION EPIDURAL; INFILTRATION; INTRACAUDAL; PERINEURAL
Status: DISCONTINUED | OUTPATIENT
Start: 2025-07-16 | End: 2025-07-16 | Stop reason: SDUPTHER

## 2025-07-16 RX ORDER — OXYBUTYNIN CHLORIDE 5 MG/1
5 TABLET, EXTENDED RELEASE ORAL DAILY
Qty: 14 TABLET | Refills: 0 | Status: SHIPPED | OUTPATIENT
Start: 2025-07-16 | End: 2025-07-24 | Stop reason: ALTCHOICE

## 2025-07-16 RX ORDER — SODIUM CHLORIDE 9 MG/ML
INJECTION, SOLUTION INTRAVENOUS CONTINUOUS
Status: DISCONTINUED | OUTPATIENT
Start: 2025-07-16 | End: 2025-07-16 | Stop reason: HOSPADM

## 2025-07-16 RX ORDER — PROPOFOL 10 MG/ML
INJECTION, EMULSION INTRAVENOUS
Status: DISCONTINUED | OUTPATIENT
Start: 2025-07-16 | End: 2025-07-16 | Stop reason: SDUPTHER

## 2025-07-16 RX ORDER — ONDANSETRON 2 MG/ML
4 INJECTION INTRAMUSCULAR; INTRAVENOUS
Status: DISCONTINUED | OUTPATIENT
Start: 2025-07-16 | End: 2025-07-16 | Stop reason: HOSPADM

## 2025-07-16 RX ORDER — SODIUM CHLORIDE, SODIUM LACTATE, POTASSIUM CHLORIDE, CALCIUM CHLORIDE 600; 310; 30; 20 MG/100ML; MG/100ML; MG/100ML; MG/100ML
INJECTION, SOLUTION INTRAVENOUS CONTINUOUS
Status: DISCONTINUED | OUTPATIENT
Start: 2025-07-16 | End: 2025-07-16 | Stop reason: HOSPADM

## 2025-07-16 RX ORDER — SODIUM CHLORIDE 9 MG/ML
INJECTION, SOLUTION INTRAVENOUS PRN
Status: DISCONTINUED | OUTPATIENT
Start: 2025-07-16 | End: 2025-07-16 | Stop reason: HOSPADM

## 2025-07-16 RX ORDER — MIDAZOLAM HYDROCHLORIDE 2 MG/2ML
2 INJECTION, SOLUTION INTRAMUSCULAR; INTRAVENOUS
Status: DISCONTINUED | OUTPATIENT
Start: 2025-07-16 | End: 2025-07-16 | Stop reason: HOSPADM

## 2025-07-16 RX ORDER — METOCLOPRAMIDE HYDROCHLORIDE 5 MG/ML
10 INJECTION INTRAMUSCULAR; INTRAVENOUS
Status: DISCONTINUED | OUTPATIENT
Start: 2025-07-16 | End: 2025-07-16 | Stop reason: HOSPADM

## 2025-07-16 RX ORDER — HYDRALAZINE HYDROCHLORIDE 20 MG/ML
10 INJECTION INTRAMUSCULAR; INTRAVENOUS
Status: DISCONTINUED | OUTPATIENT
Start: 2025-07-16 | End: 2025-07-16 | Stop reason: HOSPADM

## 2025-07-16 RX ORDER — DIPHENHYDRAMINE HYDROCHLORIDE 50 MG/ML
12.5 INJECTION, SOLUTION INTRAMUSCULAR; INTRAVENOUS
Status: DISCONTINUED | OUTPATIENT
Start: 2025-07-16 | End: 2025-07-16 | Stop reason: HOSPADM

## 2025-07-16 RX ORDER — OXYCODONE HYDROCHLORIDE 5 MG/1
10 TABLET ORAL PRN
Status: DISCONTINUED | OUTPATIENT
Start: 2025-07-16 | End: 2025-07-16 | Stop reason: HOSPADM

## 2025-07-16 RX ORDER — SODIUM CHLORIDE 0.9 % (FLUSH) 0.9 %
5-40 SYRINGE (ML) INJECTION PRN
Status: DISCONTINUED | OUTPATIENT
Start: 2025-07-16 | End: 2025-07-16 | Stop reason: HOSPADM

## 2025-07-16 RX ORDER — ONDANSETRON 2 MG/ML
INJECTION INTRAMUSCULAR; INTRAVENOUS
Status: DISCONTINUED | OUTPATIENT
Start: 2025-07-16 | End: 2025-07-16 | Stop reason: SDUPTHER

## 2025-07-16 RX ORDER — PHENAZOPYRIDINE HYDROCHLORIDE 100 MG/1
100 TABLET, FILM COATED ORAL 3 TIMES DAILY PRN
Qty: 15 TABLET | Refills: 0 | Status: SHIPPED | OUTPATIENT
Start: 2025-07-16 | End: 2025-07-21

## 2025-07-16 RX ORDER — DEXAMETHASONE SODIUM PHOSPHATE 4 MG/ML
INJECTION, SOLUTION INTRA-ARTICULAR; INTRALESIONAL; INTRAMUSCULAR; INTRAVENOUS; SOFT TISSUE
Status: DISCONTINUED | OUTPATIENT
Start: 2025-07-16 | End: 2025-07-16 | Stop reason: SDUPTHER

## 2025-07-16 RX ORDER — MEPERIDINE HYDROCHLORIDE 50 MG/ML
12.5 INJECTION INTRAMUSCULAR; INTRAVENOUS; SUBCUTANEOUS ONCE
Status: DISCONTINUED | OUTPATIENT
Start: 2025-07-16 | End: 2025-07-16 | Stop reason: HOSPADM

## 2025-07-16 RX ORDER — OXYCODONE HYDROCHLORIDE 5 MG/1
5 TABLET ORAL PRN
Status: DISCONTINUED | OUTPATIENT
Start: 2025-07-16 | End: 2025-07-16 | Stop reason: HOSPADM

## 2025-07-16 RX ORDER — MORPHINE SULFATE 2 MG/ML
1 INJECTION, SOLUTION INTRAMUSCULAR; INTRAVENOUS EVERY 5 MIN PRN
Status: DISCONTINUED | OUTPATIENT
Start: 2025-07-16 | End: 2025-07-16 | Stop reason: HOSPADM

## 2025-07-16 RX ORDER — LABETALOL HYDROCHLORIDE 5 MG/ML
10 INJECTION, SOLUTION INTRAVENOUS
Status: DISCONTINUED | OUTPATIENT
Start: 2025-07-16 | End: 2025-07-16 | Stop reason: HOSPADM

## 2025-07-16 RX ADMIN — HYDRALAZINE HYDROCHLORIDE 10 MG: 20 INJECTION INTRAMUSCULAR; INTRAVENOUS at 15:49

## 2025-07-16 RX ADMIN — LIDOCAINE HYDROCHLORIDE 50 MG: 10 INJECTION, SOLUTION EPIDURAL; INFILTRATION; INTRACAUDAL; PERINEURAL at 14:02

## 2025-07-16 RX ADMIN — SODIUM CHLORIDE, PRESERVATIVE FREE 10 ML: 5 INJECTION INTRAVENOUS at 11:26

## 2025-07-16 RX ADMIN — PROPOFOL 100 MG: 10 INJECTION, EMULSION INTRAVENOUS at 14:06

## 2025-07-16 RX ADMIN — PROPOFOL 200 MG: 10 INJECTION, EMULSION INTRAVENOUS at 14:02

## 2025-07-16 RX ADMIN — ONDANSETRON 4 MG: 2 INJECTION INTRAMUSCULAR; INTRAVENOUS at 14:14

## 2025-07-16 RX ADMIN — Medication 2000 MG: at 14:09

## 2025-07-16 RX ADMIN — SODIUM CHLORIDE, POTASSIUM CHLORIDE, SODIUM LACTATE AND CALCIUM CHLORIDE: 600; 310; 30; 20 INJECTION, SOLUTION INTRAVENOUS at 11:26

## 2025-07-16 RX ADMIN — DEXAMETHASONE SODIUM PHOSPHATE 8 MG: 4 INJECTION INTRA-ARTICULAR; INTRALESIONAL; INTRAMUSCULAR; INTRAVENOUS; SOFT TISSUE at 14:14

## 2025-07-16 ASSESSMENT — PAIN - FUNCTIONAL ASSESSMENT
PAIN_FUNCTIONAL_ASSESSMENT: 0-10
PAIN_FUNCTIONAL_ASSESSMENT: NONE - DENIES PAIN

## 2025-07-16 NOTE — ANESTHESIA POSTPROCEDURE EVALUATION
Department of Anesthesiology  Postprocedure Note    Patient: Aguila Mccarthy  MRN: [id number]  YOB: 1935  Date of evaluation: 7/16/2025    Procedure Summary       Date: 07/16/25 Room / Location: [address]    Anesthesia Start: 1358 Anesthesia Stop: 1438    Procedure: CYSTOSCOPY TRANSURETHRAL RESECTION PROSTATE Diagnosis:       BPH with elevated PSA and lower urinary tract symptoms      (BPH with elevated PSA and lower urinary tract symptoms [N40.1, R97.20])    Surgeons: Chente Martino Jr., MD Responsible Provider: Elsy Cardenas MD    Anesthesia Type: MAC ASA Status: 3            Anesthesia Type: MAC    Francia Phase I: Francia Score: 10    Francia Phase II:      Anesthesia Post Evaluation    Patient location during evaluation: PACU  Patient participation: complete - patient participated  Level of consciousness: awake and awake and alert  Pain score: 4  Nausea & Vomiting: no nausea and no vomiting  Cardiovascular status: hemodynamically stable and blood pressure returned to baseline  Respiratory status: acceptable  Hydration status: euvolemic  Multimodal analgesia pain management approach  Pain management: adequate    No notable events documented.

## 2025-07-16 NOTE — H&P
[facility]  Urology H&P Note     Patient:  Aguila Mccarthy  MRN: [id number]  YOB: 1935    ATTENDING: Chente Martino Jr, MD     CHIEF COMPLAINT:  BPH    HISTORY OF PRESENT ILLNESS:   The patient is a 89 y.o. male who presents with BPH    Patient's old records, notes and chart reviewed and summarized above.     Past Medical History:    Past Medical History:   Diagnosis Date    Acid reflux     Arrhythmia     Mobitz II, CHB, bradycardia-- s/p Pacemaker insertion    C2 cervical fracture (HCC) 2016    treated conservatively    Cancer (HCC)     skin - melanoma    Enlarged prostate     BPH    History of blood transfusion     Hyperlipidemia     SARAH (obstructive sleep apnea)     cpap    Syncope     Thrombocytopenia     borderline episodic    Under care of team     CArdiologist Dr. Vasile Erazo [facility]       Past Surgical History:    Past Surgical History:   Procedure Laterality Date    CARDIAC CATHETERIZATION      no intervention    COLONOSCOPY  09/2016    [facility]    FRACTURE SURGERY      HIP SURGERY Right     ORIF after fall on the ice- Fx femur    HIP SURGERY Right 01/2015    PACEMAKER PLACEMENT  04/2025    DAWOOD dual chamber pacemaker    TURP  01/2025    UPPER GASTROINTESTINAL ENDOSCOPY  06/03/2019       Medications Prior to Admission:   Prior to Admission medications    Medication Sig Start Date End Date Taking? Authorizing Provider   atorvastatin (LIPITOR) 20 MG tablet take 1 tablet by mouth at bedtime 6/12/25   Ida Vann MD   pantoprazole (PROTONIX) 20 MG tablet Take 1 tablet by mouth once daily 5/22/25   Ida Vann MD   tolterodine (DETROL LA) 4 MG extended release capsule Take 1 capsule by mouth once daily 4/9/25   Ida Vann MD   Handicap Placard MISC by Does not apply route The disability is expected to last 5 years  Dx: hip arthritis 3/12/25   Ida Vann MD   tamsulosin (FLOMAX) 0.4 MG capsule Take 1 capsule by mouth once daily 1/29/25   Ida Vann MD  Sign     Worried About Running Out of Food in the Last Year: Patient declined     Ran Out of Food in the Last Year: Patient declined   Transportation Needs: Patient Declined (1/17/2025)    PRAPARE - Transportation     Lack of Transportation (Medical): Patient declined     Lack of Transportation (Non-Medical): Patient declined   Physical Activity: Inactive (1/17/2025)    Exercise Vital Sign     Days of Exercise per Week: 0 days     Minutes of Exercise per Session: 0 min   Stress: Not on file   Social Connections: Not on file   Intimate Partner Violence: Not on file   Housing Stability: Unknown (1/17/2025)    Housing Stability Vital Sign     Unable to Pay for Housing in the Last Year: Patient declined     Number of Times Moved in the Last Year: 0     Homeless in the Last Year: No       Family History:    Family History   Problem Relation Age of Onset    Stroke Father     Coronary Art Dis Father     Stroke Sister        REVIEW OF SYSTEMS:  All systems reviewed and negative except for that already noted in the HPI.    Physical Exam:      No data found.  Constitutional: Patient in no acute distress;   Neuro: alert and oriented to person place and time.    Psych: Mood and affect normal.  Skin: Normal  Lungs: Respiratory effort normal  Cardiovascular:  Normal peripheral pulses  Abdomen: Soft, non-tender, non-distended with no CVA, flank pain, hepatosplenomegaly or hernia.    Kidneys normal.  Bladder non-tender and not distended.  Lymphatics: no palpable lymphadenopathy        Assessment and Plan   Impression:    Patient Active Problem List   Diagnosis    Essential hypertension    Hyperlipemia    Thrombocytopenia    Benign prostatic hyperplasia    Erectile dysfunction    Abnormality of gait    Left renal mass    Peripheral vascular disease    Lower extremity edema       Plan: TURP    Risks benefits and alternative procedures are explained, informed consent is obtained, and the patient elects to proceed.

## 2025-07-16 NOTE — OP NOTE
FACILITY:  [facility]     DATE:  7/16/25   Aguila Mccarthy   1935   [id number]      Surgeon: Dr. Chente Martino Jr, MD MD  Asst.: Dr. Chente Martino Jr, MD MD  PREOPERATIVE DIAGNOSES:  1. Urinary retention.  2. Benign prostatic hyperplasia with obstruction.  POSTOPERATIVE DIAGNOSIS:  1. Urinary retention.  2. Benign prostatic hyperplasia with obstruction.  PROCEDURES PERFORMED:  1. Cystoscopy.  2. Transurethral resection of prostate  ANESTHESIA: General.  COMPLICATIONS:  None.  SPECIMENS:  Urine for culture.  ESTIMATED BLOOD LOSS:  Minimal.  DRAINS:  A 22 [place] 3-way Holt catheter.     INDICATIONS:NAME@ is a 89 y.o. male  presents today for TURP of the prostate. After risks, benefits and alternatives of the procedure were discussed with the patient, informed consent was obtained and the patient elected to proceed.     OPERATIVE SUMMARY:  The risks and benefits of the procedure were explained to the patient in the preoperative area. After informed consent was obtained, the patient was taken back to the operating room. The patient was transferred to the operating table and placed in a supine position. General anesthesia was induced and the patient was placed in the dorsal lithotomy position. He was prepped and draped in a sterile fashion and a time-out was performed to confirm patient identity and procedure. Prior to induction of anesthesia the patient was administered preoperative antibiotics and EPC cuffs were on and functioning. Our continuous flow sheath with obturator and lens was inserted through the patient's urethra and into the bladder. Upon entering the bladder we located both ureteral orifices, they were at a safe distance from the vesical neck.  On evaluation of the prostate the patient was noted to have median lobe. The resectoscope was then inserted after we removed our obturator and placed our working bridge through out continous flow sheath. Resection was initiated

## 2025-07-16 NOTE — ANESTHESIA PRE PROCEDURE
Department of Anesthesiology  Preprocedure Note       Name:  Aguila Mccarthy   Age:  89 y.o.  :  1935                                          MRN:  [id number]         Date:  2025      Surgeon: Surgeon(s):  Chente Martino Jr., MD    Procedure: Procedure(s):  CYSTOSCOPY TRANSURETHRAL RESECTION PROSTATE    Medications prior to admission:   Prior to Admission medications    Medication Sig Start Date End Date Taking? Authorizing Provider   atorvastatin (LIPITOR) 20 MG tablet take 1 tablet by mouth at bedtime 25  Yes Ida Vann MD   pantoprazole (PROTONIX) 20 MG tablet Take 1 tablet by mouth once daily 25  Yes Ida Vann MD   tolterodine (DETROL LA) 4 MG extended release capsule Take 1 capsule by mouth once daily 25  Yes Ida Vann MD   tamsulosin (FLOMAX) 0.4 MG capsule Take 1 capsule by mouth once daily 25  Yes Ida Vann MD   ketoconazole (NIZORAL) 2 % shampoo APPLY TO SCALP, LATHER AND RINSE 2 TIMES WEEKLY 24  Yes Ida Vann MD   finasteride (PROSCAR) 5 MG tablet Take 1 tablet by mouth daily   Yes Provider, MD Amy   loratadine (CLARITIN) 10 MG tablet Take 1 tablet by mouth daily   Yes Provider, MD Amy   famotidine (PEPCID) 20 MG tablet Take 1 tablet by mouth 2 times daily   Yes Provider, MD Amy   acetaminophen (TYLENOL) 500 MG tablet Take 2 tablets by mouth every 8 (eight) hours   Yes Provider, MD Amy   ferrous sulfate (SLOW FE) 142 (45 Fe) MG extended release tablet Take 142 mg by mouth daily 3/21/22  Yes Ida Vann MD   vitamin B-12 (CYANOCOBALAMIN) 1000 MCG tablet Take 5 tablets by mouth daily   Yes ProviderAmy MD   docusate sodium (COLACE) 100 MG capsule Take 1 capsule by mouth 2 times daily 20  Yes Ida Vann MD   polyethylene glycol (GLYCOLAX) powder Take 17 g by mouth daily   Yes Provider, MD Amy   Handicap Placard MISC by Does not apply route The disability is expected

## 2025-07-16 NOTE — DISCHARGE INSTRUCTIONS
Discharge instructions: TURP of the prostate:   The patient should have CBI weaned off in recovery.  Please call if urine is not clear / pink with CBI.  Traction on the catheter should be released before discharge.  If taking prostate medications, continue for 1 month    You may see blood in the urine after the procedure.  This should resolve over the next couple days.  Please stay hydrated.  You may experience frequency/urgency of urination after the procedure.  We expect these symptoms to improve over the next couple weeks.      Tylenol for pain control  Pt ok to discharge home in good condition  No heavy lifting, >10 lbs for today  Pt should avoid strenuous activity for today  Pt should walk moderately at home  Pt ok to shower   Pt may resume diet as tolerated  Pt should take Rx as directed  No driving while on narcotics  Please call attending physician or hospital  with questions  Call or Present to ED if fever (> 101F), intractable nausea vomiting or pain.    If taking, Please hold blood thinning medications for 3-5 days till hematuria improves.       Pt should follow up with Dr. Martino, 1-3 days to have catheter removed, call to confirm appointment    Home with jordan catheter.  Please teach jordan education and send home with leg and night bag.  You may see intermittent blood in the urine while the catheter in place.  If the catheter becomes obstructed and needs to be exchanged, please call.

## 2025-07-18 NOTE — TELEPHONE ENCOUNTER
Called for report   [facility] Visit Note:  Arrived for Prolia    /78   Pulse 72   Temp 97.6 °F (36.4 °C)   Resp 16   Wt 84.4 kg (186 lb)   SpO2 94%   BMI 29.13 kg/m²     Assessment - unremarkable. Reviewed Prolia info. Pt denies any recent or upcoming dental work.    Labs obtained: Chem8, Mag, Phos  Recent Results (from the past 12 hours)   POC CHEM 8    Collection Time: 07/18/25 11:35 AM   Result Value Ref Range    POC Ionized Calcium 1.15 1.15 - 1.33 mmol/L    POC Sodium 132 (L) 136 - 145 mmol/L    POC Potassium 4.2 3.5 - 5.1 mmol/L    POC Chloride 99 98 - 107 mmol/L    POC TCO2 23.0 21 - 32 mmol/L    Anion Gap, POC 10 10 - 20 mmol/L    POC Glucose 150 (H) 74 - 99 mg/dL    POC Creatinine 0.75 0.6 - 1.3 mg/dL    eGFR, POC 83 >60 ml/min/1.73m2    UA Comment Comment Not Indicated.         Medication given:  Prolia 60mg SC in left arm    Tolerated well. No reaction noted. Reviewed Prolia D/C instructions. Verbalized understanding. Pt denies any acute problems/changes. Discharged from [facility] ambulatory. No distress. Next appt: 1/19/26

## 2025-07-19 LAB — SURGICAL PATHOLOGY REPORT: NORMAL

## 2025-07-24 ENCOUNTER — OFFICE VISIT (OUTPATIENT)
Dept: FAMILY MEDICINE CLINIC | Age: 89
End: 2025-07-24
Payer: MEDICARE

## 2025-07-24 VITALS
SYSTOLIC BLOOD PRESSURE: 136 MMHG | WEIGHT: 211 LBS | BODY MASS INDEX: 28.62 KG/M2 | OXYGEN SATURATION: 96 % | HEART RATE: 66 BPM | DIASTOLIC BLOOD PRESSURE: 70 MMHG

## 2025-07-24 DIAGNOSIS — E78.2 MIXED HYPERLIPIDEMIA: ICD-10-CM

## 2025-07-24 DIAGNOSIS — D69.6 THROMBOCYTOPENIA: ICD-10-CM

## 2025-07-24 DIAGNOSIS — I10 ESSENTIAL HYPERTENSION: ICD-10-CM

## 2025-07-24 DIAGNOSIS — N40.0 BENIGN PROSTATIC HYPERPLASIA, UNSPECIFIED WHETHER LOWER URINARY TRACT SYMPTOMS PRESENT: Primary | ICD-10-CM

## 2025-07-24 PROCEDURE — 99214 OFFICE O/P EST MOD 30 MIN: CPT | Performed by: FAMILY MEDICINE

## 2025-07-24 ASSESSMENT — ENCOUNTER SYMPTOMS
COUGH: 0
NAUSEA: 0
VOMITING: 0
ABDOMINAL PAIN: 0
SHORTNESS OF BREATH: 0

## 2025-07-24 NOTE — PROGRESS NOTES
[facility] - Outpatient Note      Subjective:    Aguila Mccarthy is a 89 y.o. male with  has a past medical history of Acid reflux, Arrhythmia, C2 cervical fracture (HCC), Cancer (HCC), Enlarged prostate, History of blood transfusion, Hyperlipidemia, SARAH (obstructive sleep apnea), Syncope, Thrombocytopenia, and Under care of team.    Presented to the office today for:  Chief Complaint   Patient presents with    Medication Check     6 month f/u. Pt states that in the last couple months he had a pacemaker place and had a procedure completed with urology.    Headache     Pt states that he has been waking up with headache almost everyday. Pt states that he does take tylenol daily. Pt states that after he takes his morning meds he does start to see improvement. Pt is wondering if there is something he can take for headaches.     CPAP/BiPAP     Pt now has a CPAP machine and is not sure how much it is really helping. Pt states that feels like the mask does not fit his face the right way.       HPI    Significant past medical history of BPH, to a relative, hypomimia, HTN presented to clinic for 6-month follow-up.  Patient is status post cystoscopy with transurethral resection of the prostate recently on 7/16/2025 alongside recent pacemaker placement on 04/2025.  Patient still having some mild incontinence but overall describes his flow was very well while denies any acute urgency.  States sometimes when he does ambulate he feels the like the need to urinate and has some incontinence but overall seems to be improved.  Denies any dysuria no fever no back pain.  Patient said his recent fall from right hip was several months ago at the moment denies any recent falls no head trauma no loss of consciousness.  Patient denies any chest pain or palpitation denies any swelling or irritation to the site of the pain.      Review of Systems   Constitutional:  Negative for appetite change and diaphoresis.

## 2025-08-25 LAB
ALBUMIN/GLOBULIN RATIO: 1.4 G/DL
ALBUMIN: 3.7 G/DL (ref 3.5–5)
ALP BLD-CCNC: 85 UNITS/L (ref 38–126)
ALT SERPL-CCNC: 19 UNITS/L (ref 4–50)
ANION GAP SERPL CALCULATED.3IONS-SCNC: 3.4 MMOL/L (ref 3–11)
AST SERPL-CCNC: 38 UNITS/L (ref 17–59)
BACTERIA, URINE: ABNORMAL /HPF
BASOPHILS ABSOLUTE: 0.03 X10E3/?L (ref 0–0.3)
BASOPHILS RELATIVE PERCENT: 0.73 % (ref 0–3)
BILIRUB SERPL-MCNC: 0.6 MG/DL (ref 0.2–1.3)
BILIRUBIN, URINE: NEGATIVE
BLOOD, URINE: ABNORMAL
BUN BLDV-MCNC: 28 MG/DL (ref 9–20)
CALCIUM SERPL-MCNC: 8.6 MG/DL (ref 8.4–10.2)
CASTS UA: ABNORMAL /LPF
CHLORIDE BLD-SCNC: 104 MMOL/L (ref 98–120)
CLARITY, UA: CLEAR
CO2: 31 MMOL/L (ref 22–31)
COLOR, UA: YELLOW
CREAT SERPL-MCNC: 0.7 MG/DL (ref 0.7–1.3)
CREATININE CLEARANCE: 49.4
CRYSTALS, UA: ABNORMAL
EOSINOPHILS ABSOLUTE: 0.2 X10E3/?L (ref 0–1.1)
EOSINOPHILS RELATIVE PERCENT: 4.27 % (ref 0–10)
GFR, ESTIMATED: > 60
GLOBULIN: 2.6 G/DL
GLUCOSE URINE: NEGATIVE MG/DL
GLUCOSE: 101 MG/DL (ref 75–110)
HCT VFR BLD CALC: 42 % (ref 42–52)
HEMOGLOBIN: 13 G/DL (ref 13.8–17.8)
KETONES, URINE: ABNORMAL MG/DL
LEUKOCYTE ESTERASE, URINE: ABNORMAL
LYMPHOCYTES ABSOLUTE: 0.91 X10E3/?L (ref 1–5.5)
LYMPHOCYTES RELATIVE PERCENT: 19.79 % (ref 20–51.1)
MCH RBC QN AUTO: 32.3 PG (ref 28.5–32.5)
MCHC RBC AUTO-ENTMCNC: 30.9 G/DL (ref 32–37)
MCV RBC AUTO: 104.5 FL (ref 80–94)
MONOCYTES ABSOLUTE: 0.48 X10E3/?L (ref 0.1–1)
MONOCYTES RELATIVE PERCENT: 10.38 % (ref 1.7–9.3)
NEUTROPHILS ABSOLUTE: 2.98 X10E3/?L (ref 2–8.1)
NEUTROPHILS RELATIVE PERCENT: 64.82 % (ref 42.2–75.2)
NITRITE, URINE: NEGATIVE
PDW BLD-RTO: 12 % (ref 10–15.5)
PH, URINE: 7 (ref 5–8.5)
PLATELET # BLD: 145.9 THOU/MM3 (ref 130–400)
POTASSIUM SERPL-SCNC: 4.9 MMOL/L (ref 3.6–5)
PROTEIN UA: NEGATIVE MG/DL
RBC # BLD: 4.02 M/UL (ref 4.7–6.1)
RBC URINE: ABNORMAL /HPF (ref 0–2)
SODIUM BLD-SCNC: 139 MMOL/L (ref 135–145)
SPECIFIC GRAVITY UA: 1.02 E.U./DL (ref 1–1.03)
SQUAMOUS EPITHELIAL: ABNORMAL /HPF
TOTAL PROTEIN: 6.3 G/DL (ref 6.3–8.2)
UROBILINOGEN, URINE: 0.2 MG/DL (ref 0.2–1)
WBC # BLD: 4.6 THOU/ML3 (ref 4.8–10.8)
WBC URINE: ABNORMAL /HPF (ref 0–4)
YEAST, URINE: ABNORMAL

## 2025-08-26 LAB
ANION GAP SERPL CALCULATED.3IONS-SCNC: 6.4 MMOL/L (ref 3–11)
BASOPHILS ABSOLUTE: 0.06 X10E3/?L (ref 0–0.3)
BASOPHILS RELATIVE PERCENT: 0.73 % (ref 0–3)
BUN BLDV-MCNC: 26 MG/DL (ref 9–20)
CALCIUM SERPL-MCNC: 8 MG/DL (ref 8.4–10.2)
CHLORIDE BLD-SCNC: 106 MMOL/L (ref 98–120)
CO2: 27 MMOL/L (ref 22–31)
CREAT SERPL-MCNC: 0.6 MG/DL (ref 0.7–1.3)
CREATININE CLEARANCE: 49.4
EOSINOPHILS ABSOLUTE: 0 X10E3/?L (ref 0–1.1)
EOSINOPHILS RELATIVE PERCENT: 0.05 % (ref 0–10)
GFR, ESTIMATED: > 60
GLUCOSE: 118 MG/DL (ref 75–110)
HCT VFR BLD CALC: 34.2 % (ref 42–52)
HEMOGLOBIN: 10.7 G/DL (ref 13.8–17.8)
LYMPHOCYTES ABSOLUTE: 0.72 X10E3/?L (ref 1–5.5)
LYMPHOCYTES RELATIVE PERCENT: 8.91 % (ref 20–51.1)
MCH RBC QN AUTO: 32.4 PG (ref 28.5–32.5)
MCHC RBC AUTO-ENTMCNC: 31.1 G/DL (ref 32–37)
MCV RBC AUTO: 104 FL (ref 80–94)
MONOCYTES ABSOLUTE: 0.8 X10E3/?L (ref 0.1–1)
MONOCYTES RELATIVE PERCENT: 9.91 % (ref 1.7–9.3)
NEUTROPHILS ABSOLUTE: 6.49 X10E3/?L (ref 2–8.1)
NEUTROPHILS RELATIVE PERCENT: 80.41 % (ref 42.2–75.2)
PDW BLD-RTO: 11.7 % (ref 10–15.5)
PLATELET # BLD: 126.7 THOU/MM3 (ref 130–400)
POTASSIUM SERPL-SCNC: 4.5 MMOL/L (ref 3.6–5)
POTASSIUM SERPL-SCNC: 5.4 MMOL/L (ref 3.6–5)
RBC # BLD: 3.29 M/UL (ref 4.7–6.1)
SODIUM BLD-SCNC: 134 MMOL/L (ref 135–145)
WBC # BLD: 8.1 THOU/ML3 (ref 4.8–10.8)

## 2025-08-27 LAB
ANION GAP SERPL CALCULATED.3IONS-SCNC: 2.8 MMOL/L (ref 3–11)
BACTERIA, URINE: ABNORMAL /HPF
BASOPHILS ABSOLUTE: 0.02 X10E3/?L (ref 0–0.3)
BASOPHILS RELATIVE PERCENT: 0.24 % (ref 0–3)
BILIRUBIN, URINE: NEGATIVE
BLOOD, URINE: ABNORMAL
BUN BLDV-MCNC: 28 MG/DL (ref 9–20)
CALCIUM SERPL-MCNC: 8.4 MG/DL (ref 8.4–10.2)
CASTS UA: ABNORMAL /LPF
CHLORIDE BLD-SCNC: 107 MMOL/L (ref 98–120)
CLARITY, UA: ABNORMAL
CO2: 28 MMOL/L (ref 22–31)
COLOR, UA: ABNORMAL
CREAT SERPL-MCNC: 0.8 MG/DL (ref 0.7–1.3)
CREATININE CLEARANCE: 49.4
CRYSTALS, UA: ABNORMAL
EOSINOPHILS ABSOLUTE: 0.01 X10E3/?L (ref 0–1.1)
EOSINOPHILS RELATIVE PERCENT: 0.05 % (ref 0–10)
GFR, ESTIMATED: > 60
GLUCOSE URINE: NEGATIVE MG/DL
GLUCOSE: 114 MG/DL (ref 75–110)
HCT VFR BLD CALC: 30.5 % (ref 42–52)
HEMOGLOBIN: 9.5 G/DL (ref 13.8–17.8)
KETONES, URINE: ABNORMAL MG/DL
LEUKOCYTE ESTERASE, URINE: ABNORMAL
LYMPHOCYTES ABSOLUTE: 0.65 X10E3/?L (ref 1–5.5)
LYMPHOCYTES RELATIVE PERCENT: 6.83 % (ref 20–51.1)
MCH RBC QN AUTO: 32.4 PG (ref 28.5–32.5)
MCHC RBC AUTO-ENTMCNC: 31.2 G/DL (ref 32–37)
MCV RBC AUTO: 103.8 FL (ref 80–94)
MONOCYTES ABSOLUTE: 0.94 X10E3/?L (ref 0.1–1)
MONOCYTES RELATIVE PERCENT: 9.9 % (ref 1.7–9.3)
NEUTROPHILS ABSOLUTE: 7.9 X10E3/?L (ref 2–8.1)
NEUTROPHILS RELATIVE PERCENT: 82.97 % (ref 42.2–75.2)
NITRITE, URINE: NEGATIVE
PDW BLD-RTO: 11.9 % (ref 10–15.5)
PH, URINE: 5 (ref 5–8.5)
PLATELET # BLD: 114.5 THOU/MM3 (ref 130–400)
POTASSIUM SERPL-SCNC: 4.3 MMOL/L (ref 3.6–5)
PROTEIN UA: ABNORMAL MG/DL
RBC # BLD: 2.93 M/UL (ref 4.7–6.1)
RBC URINE: ABNORMAL /HPF (ref 0–2)
SODIUM BLD-SCNC: 138 MMOL/L (ref 135–145)
SPECIFIC GRAVITY UA: 1.02 E.U./DL (ref 1–1.03)
SQUAMOUS EPITHELIAL: ABNORMAL /HPF
UROBILINOGEN, URINE: 0.2 MG/DL (ref 0.2–1)
WBC # BLD: 9.5 THOU/ML3 (ref 4.8–10.8)
WBC URINE: ABNORMAL /HPF (ref 0–4)

## 2025-08-28 LAB
BASOPHILS ABSOLUTE: 0.03 X10E3/?L (ref 0–0.3)
BASOPHILS RELATIVE PERCENT: 0.42 % (ref 0–3)
EOSINOPHILS ABSOLUTE: 0.11 X10E3/?L (ref 0–1.1)
EOSINOPHILS RELATIVE PERCENT: 1.81 % (ref 0–10)
HCT VFR BLD CALC: 25.7 % (ref 42–52)
HCT VFR BLD CALC: 29 % (ref 42–52)
HEMOGLOBIN: 8.1 G/DL (ref 13.8–17.8)
HEMOGLOBIN: 9.1 G/DL (ref 13.8–17.8)
LYMPHOCYTES ABSOLUTE: 0.73 X10E3/?L (ref 1–5.5)
LYMPHOCYTES RELATIVE PERCENT: 11.55 % (ref 20–51.1)
MCH RBC QN AUTO: 33.1 PG (ref 28.5–32.5)
MCHC RBC AUTO-ENTMCNC: 31.7 G/DL (ref 32–37)
MCV RBC AUTO: 104.3 FL (ref 80–94)
MONOCYTES ABSOLUTE: 0.49 X10E3/?L (ref 0.1–1)
MONOCYTES RELATIVE PERCENT: 7.74 % (ref 1.7–9.3)
NEUTROPHILS ABSOLUTE: 4.99 X10E3/?L (ref 2–8.1)
NEUTROPHILS RELATIVE PERCENT: 78.49 % (ref 42.2–75.2)
PDW BLD-RTO: 12.1 % (ref 10–15.5)
PLATELET # BLD: 90.96 THOU/MM3 (ref 130–400)
RBC # BLD: 2.46 M/UL (ref 4.7–6.1)
WBC # BLD: 6.4 THOU/ML3 (ref 4.8–10.8)

## 2025-08-29 LAB
HCT VFR BLD CALC: 28.3 % (ref 42–52)
HEMOGLOBIN: 8.8 G/DL (ref 13.8–17.8)
MCH RBC QN AUTO: 31.9 PG (ref 28.5–32.5)
MCHC RBC AUTO-ENTMCNC: 31.1 G/DL (ref 32–37)
MCV RBC AUTO: 102.7 FL (ref 80–94)
PDW BLD-RTO: 12.6 % (ref 10–15.5)
PLATELET # BLD: 122.4 THOU/MM3 (ref 130–400)
RBC # BLD: 2.76 M/UL (ref 4.7–6.1)
WBC # BLD: 5.4 THOU/ML3 (ref 4.8–10.8)

## 2025-09-02 ENCOUNTER — TELEPHONE (OUTPATIENT)
Dept: FAMILY MEDICINE CLINIC | Age: 89
End: 2025-09-02

## 2025-09-03 LAB
BACTERIA, URINE: ABNORMAL /HPF
BILIRUBIN, URINE: NEGATIVE
BLOOD, URINE: ABNORMAL
CASTS UA: ABNORMAL /LPF
CLARITY, UA: ABNORMAL
COLOR, UA: YELLOW
CRYSTALS, UA: PRESENT
GLUCOSE URINE: NEGATIVE MG/DL
KETONES, URINE: NEGATIVE MG/DL
LEUKOCYTE ESTERASE, URINE: ABNORMAL
NITRITE, URINE: NEGATIVE
PH, URINE: 8.5 (ref 5–8.5)
PROTEIN UA: ABNORMAL MG/DL
RBC URINE: ABNORMAL /HPF (ref 0–2)
SPECIFIC GRAVITY UA: 1.01 E.U./DL (ref 1–1.03)
SQUAMOUS EPITHELIAL: ABNORMAL /HPF
TRIPLE PHOSPHATE CRYSTALS: ABNORMAL /HPF
UROBILINOGEN, URINE: 0.2 MG/DL (ref 0.2–1)
WBC URINE: >100 /HPF (ref 0–4)

## (undated) DEVICE — SYRINGE ONLY,20ML LUER LOCK: Brand: MEDLINE INDUSTRIES, INC.

## (undated) DEVICE — TUBING, SUCTION, 3/16" X 10', STRAIGHT: Brand: MEDLINE

## (undated) DEVICE — GLOVE ORANGE PI 7 1/2   MSG9075

## (undated) DEVICE — SYSTEM FLD COLL W/ FLX DRP SUPP AND DISP BG

## (undated) DEVICE — STRAP,CATHETER,ELASTIC,HOOK&LOOP: Brand: MEDLINE

## (undated) DEVICE — SOL IRR SOD CHL 0.9% TITAN XL CNTNR 3000ML

## (undated) DEVICE — SOLUTION IV 1000 ML 0.9 NACL INJ USP EXCEL PLAS CONTAINER

## (undated) DEVICE — CUTTING LOOP, BIPOLAR, 24/26 FR.: Brand: N.A.

## (undated) DEVICE — MHPB CYSTO PACK-LF: Brand: MEDLINE INDUSTRIES, INC.

## (undated) DEVICE — BAG,DRAINAGE,4L,A/R TOWER,METAL CLAMP: Brand: MEDLINE

## (undated) DEVICE — CATHETER URETH 22FR BLLN 30CC 3 W F SPEC INF CTRL BARDX

## (undated) DEVICE — SYRINGE MED 20ML STD CLR PLAS LUERLOCK TIP N CTRL DISP